# Patient Record
Sex: FEMALE | Race: WHITE | Employment: UNEMPLOYED | URBAN - METROPOLITAN AREA
[De-identification: names, ages, dates, MRNs, and addresses within clinical notes are randomized per-mention and may not be internally consistent; named-entity substitution may affect disease eponyms.]

---

## 2019-05-28 ENCOUNTER — HOSPITAL ENCOUNTER (INPATIENT)
Age: 69
LOS: 4 days | Discharge: HOME | DRG: 190 | End: 2019-06-01
Attending: EMERGENCY MEDICINE | Admitting: FAMILY MEDICINE
Payer: MEDICARE

## 2019-05-28 ENCOUNTER — APPOINTMENT (OUTPATIENT)
Dept: GENERAL RADIOLOGY | Age: 69
DRG: 190 | End: 2019-05-28
Payer: MEDICARE

## 2019-05-28 DIAGNOSIS — E87.6 HYPOKALEMIA: ICD-10-CM

## 2019-05-28 DIAGNOSIS — J44.1 COPD EXACERBATION (HCC): Primary | ICD-10-CM

## 2019-05-28 LAB
ABSOLUTE EOS #: 0.18 K/UL (ref 0–0.44)
ABSOLUTE IMMATURE GRANULOCYTE: 0.01 K/UL (ref 0–0.3)
ABSOLUTE LYMPH #: 3.55 K/UL (ref 1.1–3.7)
ABSOLUTE MONO #: 0.95 K/UL (ref 0.1–1.2)
ANION GAP SERPL CALCULATED.3IONS-SCNC: 17 MMOL/L (ref 9–17)
BASOPHILS # BLD: 1 % (ref 0–2)
BASOPHILS ABSOLUTE: 0.07 K/UL (ref 0–0.2)
BNP INTERPRETATION: ABNORMAL
BUN BLDV-MCNC: 8 MG/DL (ref 8–23)
BUN/CREAT BLD: 9 (ref 9–20)
CALCIUM SERPL-MCNC: 9.6 MG/DL (ref 8.6–10.4)
CHLORIDE BLD-SCNC: 101 MMOL/L (ref 98–107)
CO2: 21 MMOL/L (ref 20–31)
CREAT SERPL-MCNC: 0.85 MG/DL (ref 0.5–0.9)
DIFFERENTIAL TYPE: NORMAL
EOSINOPHILS RELATIVE PERCENT: 2 % (ref 1–4)
GFR AFRICAN AMERICAN: >60 ML/MIN
GFR NON-AFRICAN AMERICAN: >60 ML/MIN
GFR SERPL CREATININE-BSD FRML MDRD: ABNORMAL ML/MIN/{1.73_M2}
GFR SERPL CREATININE-BSD FRML MDRD: ABNORMAL ML/MIN/{1.73_M2}
GLUCOSE BLD-MCNC: 108 MG/DL (ref 70–99)
HCT VFR BLD CALC: 43.6 % (ref 36.3–47.1)
HEMOGLOBIN: 14 G/DL (ref 11.9–15.1)
IMMATURE GRANULOCYTES: 0 %
LYMPHOCYTES # BLD: 35 % (ref 24–43)
MAGNESIUM: 1.8 MG/DL (ref 1.6–2.6)
MCH RBC QN AUTO: 32.9 PG (ref 25.2–33.5)
MCHC RBC AUTO-ENTMCNC: 32.1 G/DL (ref 28.4–34.8)
MCV RBC AUTO: 102.6 FL (ref 82.6–102.9)
MONOCYTES # BLD: 10 % (ref 3–12)
MYOGLOBIN: 48 NG/ML (ref 25–58)
MYOGLOBIN: 56 NG/ML (ref 25–58)
NRBC AUTOMATED: NORMAL PER 100 WBC
PDW BLD-RTO: 13.6 % (ref 11.8–14.4)
PLATELET # BLD: 446 K/UL (ref 138–453)
PLATELET ESTIMATE: NORMAL
PMV BLD AUTO: 9.2 FL (ref 8.1–13.5)
POTASSIUM SERPL-SCNC: 2.8 MMOL/L (ref 3.7–5.3)
PRO-BNP: 467 PG/ML
RBC # BLD: 4.25 M/UL (ref 3.95–5.11)
RBC # BLD: NORMAL 10*6/UL
SEG NEUTROPHILS: 53 % (ref 36–65)
SEGMENTED NEUTROPHILS ABSOLUTE COUNT: 5.29 K/UL (ref 1.5–8.1)
SODIUM BLD-SCNC: 139 MMOL/L (ref 135–144)
TROPONIN INTERP: NORMAL
TROPONIN INTERP: NORMAL
TROPONIN T: NORMAL NG/ML
TROPONIN T: NORMAL NG/ML
TROPONIN, HIGH SENSITIVITY: 8 NG/L (ref 0–14)
TROPONIN, HIGH SENSITIVITY: 8 NG/L (ref 0–14)
WBC # BLD: 10.1 K/UL (ref 3.5–11.3)
WBC # BLD: NORMAL 10*3/UL

## 2019-05-28 PROCEDURE — 99291 CRITICAL CARE FIRST HOUR: CPT

## 2019-05-28 PROCEDURE — 71045 X-RAY EXAM CHEST 1 VIEW: CPT

## 2019-05-28 PROCEDURE — 2060000000 HC ICU INTERMEDIATE R&B

## 2019-05-28 PROCEDURE — 94640 AIRWAY INHALATION TREATMENT: CPT

## 2019-05-28 PROCEDURE — 85025 COMPLETE CBC W/AUTO DIFF WBC: CPT

## 2019-05-28 PROCEDURE — 6360000002 HC RX W HCPCS: Performed by: NURSE PRACTITIONER

## 2019-05-28 PROCEDURE — 94761 N-INVAS EAR/PLS OXIMETRY MLT: CPT

## 2019-05-28 PROCEDURE — 2580000003 HC RX 258: Performed by: NURSE PRACTITIONER

## 2019-05-28 PROCEDURE — 94150 VITAL CAPACITY TEST: CPT

## 2019-05-28 PROCEDURE — 83874 ASSAY OF MYOGLOBIN: CPT

## 2019-05-28 PROCEDURE — 2700000000 HC OXYGEN THERAPY PER DAY

## 2019-05-28 PROCEDURE — 96375 TX/PRO/DX INJ NEW DRUG ADDON: CPT

## 2019-05-28 PROCEDURE — 80048 BASIC METABOLIC PNL TOTAL CA: CPT

## 2019-05-28 PROCEDURE — 83880 ASSAY OF NATRIURETIC PEPTIDE: CPT

## 2019-05-28 PROCEDURE — 83735 ASSAY OF MAGNESIUM: CPT

## 2019-05-28 PROCEDURE — 96374 THER/PROPH/DIAG INJ IV PUSH: CPT

## 2019-05-28 PROCEDURE — 87070 CULTURE OTHR SPECIMN AEROBIC: CPT

## 2019-05-28 PROCEDURE — 87205 SMEAR GRAM STAIN: CPT

## 2019-05-28 PROCEDURE — 6370000000 HC RX 637 (ALT 250 FOR IP): Performed by: NURSE PRACTITIONER

## 2019-05-28 PROCEDURE — 93005 ELECTROCARDIOGRAM TRACING: CPT | Performed by: NURSE PRACTITIONER

## 2019-05-28 PROCEDURE — 36415 COLL VENOUS BLD VENIPUNCTURE: CPT

## 2019-05-28 PROCEDURE — 84484 ASSAY OF TROPONIN QUANT: CPT

## 2019-05-28 PROCEDURE — 6360000002 HC RX W HCPCS: Performed by: EMERGENCY MEDICINE

## 2019-05-28 RX ORDER — OMEPRAZOLE 40 MG/1
40 CAPSULE, DELAYED RELEASE ORAL DAILY
COMMUNITY

## 2019-05-28 RX ORDER — METOPROLOL SUCCINATE 25 MG/1
25 TABLET, EXTENDED RELEASE ORAL DAILY
COMMUNITY

## 2019-05-28 RX ORDER — NICOTINE 21 MG/24HR
1 PATCH, TRANSDERMAL 24 HOURS TRANSDERMAL DAILY PRN
Status: DISCONTINUED | OUTPATIENT
Start: 2019-05-28 | End: 2019-06-01 | Stop reason: HOSPADM

## 2019-05-28 RX ORDER — ACETAMINOPHEN 325 MG/1
650 TABLET ORAL EVERY 4 HOURS PRN
Status: DISCONTINUED | OUTPATIENT
Start: 2019-05-28 | End: 2019-06-01 | Stop reason: HOSPADM

## 2019-05-28 RX ORDER — IPRATROPIUM BROMIDE AND ALBUTEROL SULFATE 2.5; .5 MG/3ML; MG/3ML
1 SOLUTION RESPIRATORY (INHALATION) EVERY 4 HOURS PRN
Status: DISCONTINUED | OUTPATIENT
Start: 2019-05-28 | End: 2019-06-01 | Stop reason: HOSPADM

## 2019-05-28 RX ORDER — FUROSEMIDE 10 MG/ML
40 INJECTION INTRAMUSCULAR; INTRAVENOUS ONCE
Status: COMPLETED | OUTPATIENT
Start: 2019-05-28 | End: 2019-05-28

## 2019-05-28 RX ORDER — ISOSORBIDE MONONITRATE 30 MG/1
30 TABLET, EXTENDED RELEASE ORAL DAILY
COMMUNITY

## 2019-05-28 RX ORDER — GABAPENTIN 300 MG/1
600 CAPSULE ORAL 2 TIMES DAILY
Status: DISCONTINUED | OUTPATIENT
Start: 2019-05-28 | End: 2019-06-01 | Stop reason: HOSPADM

## 2019-05-28 RX ORDER — CYCLOBENZAPRINE HCL 5 MG
5 TABLET ORAL 3 TIMES DAILY PRN
Status: ON HOLD | COMMUNITY
End: 2019-05-30

## 2019-05-28 RX ORDER — FLUTICASONE PROPIONATE 50 MCG
1 SPRAY, SUSPENSION (ML) NASAL DAILY
COMMUNITY

## 2019-05-28 RX ORDER — METOPROLOL SUCCINATE 25 MG/1
25 TABLET, EXTENDED RELEASE ORAL DAILY
Status: DISCONTINUED | OUTPATIENT
Start: 2019-05-29 | End: 2019-06-01 | Stop reason: HOSPADM

## 2019-05-28 RX ORDER — FUROSEMIDE 40 MG/1
40 TABLET ORAL DAILY
Status: DISCONTINUED | OUTPATIENT
Start: 2019-05-29 | End: 2019-06-01 | Stop reason: HOSPADM

## 2019-05-28 RX ORDER — POTASSIUM CHLORIDE 20 MEQ/1
20 TABLET, EXTENDED RELEASE ORAL 2 TIMES DAILY WITH MEALS
Status: DISCONTINUED | OUTPATIENT
Start: 2019-05-29 | End: 2019-05-29

## 2019-05-28 RX ORDER — ONDANSETRON 2 MG/ML
4 INJECTION INTRAMUSCULAR; INTRAVENOUS EVERY 6 HOURS PRN
Status: DISCONTINUED | OUTPATIENT
Start: 2019-05-28 | End: 2019-05-28 | Stop reason: SDUPTHER

## 2019-05-28 RX ORDER — MAGNESIUM SULFATE 1 G/100ML
1 INJECTION INTRAVENOUS ONCE
Status: COMPLETED | OUTPATIENT
Start: 2019-05-28 | End: 2019-05-28

## 2019-05-28 RX ORDER — ONDANSETRON 4 MG/1
4 TABLET, ORALLY DISINTEGRATING ORAL EVERY 6 HOURS PRN
Status: DISCONTINUED | OUTPATIENT
Start: 2019-05-28 | End: 2019-06-01 | Stop reason: HOSPADM

## 2019-05-28 RX ORDER — AMITRIPTYLINE HYDROCHLORIDE 25 MG/1
25 TABLET, FILM COATED ORAL NIGHTLY PRN
COMMUNITY

## 2019-05-28 RX ORDER — IPRATROPIUM BROMIDE AND ALBUTEROL SULFATE 2.5; .5 MG/3ML; MG/3ML
1 SOLUTION RESPIRATORY (INHALATION)
Status: DISCONTINUED | OUTPATIENT
Start: 2019-05-29 | End: 2019-06-01 | Stop reason: HOSPADM

## 2019-05-28 RX ORDER — ISOSORBIDE MONONITRATE 30 MG/1
30 TABLET, EXTENDED RELEASE ORAL DAILY
Status: DISCONTINUED | OUTPATIENT
Start: 2019-05-29 | End: 2019-06-01 | Stop reason: HOSPADM

## 2019-05-28 RX ORDER — METHYLPREDNISOLONE SODIUM SUCCINATE 125 MG/2ML
80 INJECTION, POWDER, LYOPHILIZED, FOR SOLUTION INTRAMUSCULAR; INTRAVENOUS EVERY 8 HOURS
Status: DISCONTINUED | OUTPATIENT
Start: 2019-05-29 | End: 2019-05-30

## 2019-05-28 RX ORDER — ALBUTEROL SULFATE 90 UG/1
2 AEROSOL, METERED RESPIRATORY (INHALATION)
Status: DISCONTINUED | OUTPATIENT
Start: 2019-05-28 | End: 2019-05-28

## 2019-05-28 RX ORDER — ONDANSETRON 2 MG/ML
4 INJECTION INTRAMUSCULAR; INTRAVENOUS EVERY 6 HOURS PRN
Status: DISCONTINUED | OUTPATIENT
Start: 2019-05-28 | End: 2019-06-01 | Stop reason: HOSPADM

## 2019-05-28 RX ORDER — BUSPIRONE HYDROCHLORIDE 5 MG/1
7.5 TABLET ORAL 2 TIMES DAILY
Status: DISCONTINUED | OUTPATIENT
Start: 2019-05-28 | End: 2019-06-01 | Stop reason: HOSPADM

## 2019-05-28 RX ORDER — SODIUM CHLORIDE 0.9 % (FLUSH) 0.9 %
10 SYRINGE (ML) INJECTION EVERY 12 HOURS SCHEDULED
Status: DISCONTINUED | OUTPATIENT
Start: 2019-05-29 | End: 2019-06-01 | Stop reason: HOSPADM

## 2019-05-28 RX ORDER — MAGNESIUM SULFATE 1 G/100ML
1 INJECTION INTRAVENOUS ONCE
Status: COMPLETED | OUTPATIENT
Start: 2019-05-28 | End: 2019-05-29

## 2019-05-28 RX ORDER — BUDESONIDE AND FORMOTEROL FUMARATE DIHYDRATE 160; 4.5 UG/1; UG/1
2 AEROSOL RESPIRATORY (INHALATION) 2 TIMES DAILY
COMMUNITY

## 2019-05-28 RX ORDER — FUROSEMIDE 40 MG/1
40 TABLET ORAL DAILY
COMMUNITY

## 2019-05-28 RX ORDER — HYDROCODONE BITARTRATE AND ACETAMINOPHEN 5; 325 MG/1; MG/1
1 TABLET ORAL EVERY 6 HOURS PRN
Status: DISCONTINUED | OUTPATIENT
Start: 2019-05-28 | End: 2019-05-29 | Stop reason: ALTCHOICE

## 2019-05-28 RX ORDER — IPRATROPIUM BROMIDE AND ALBUTEROL SULFATE 2.5; .5 MG/3ML; MG/3ML
1 SOLUTION RESPIRATORY (INHALATION)
Status: DISCONTINUED | OUTPATIENT
Start: 2019-05-29 | End: 2019-05-28

## 2019-05-28 RX ORDER — PANTOPRAZOLE SODIUM 40 MG/1
40 TABLET, DELAYED RELEASE ORAL
Status: DISCONTINUED | OUTPATIENT
Start: 2019-05-29 | End: 2019-06-01 | Stop reason: HOSPADM

## 2019-05-28 RX ORDER — BUSPIRONE HYDROCHLORIDE 7.5 MG/1
7.5 TABLET ORAL 2 TIMES DAILY
COMMUNITY

## 2019-05-28 RX ORDER — SODIUM CHLORIDE 0.9 % (FLUSH) 0.9 %
10 SYRINGE (ML) INJECTION PRN
Status: DISCONTINUED | OUTPATIENT
Start: 2019-05-28 | End: 2019-06-01 | Stop reason: HOSPADM

## 2019-05-28 RX ORDER — FLUTICASONE PROPIONATE 50 MCG
1 SPRAY, SUSPENSION (ML) NASAL DAILY
Status: DISCONTINUED | OUTPATIENT
Start: 2019-05-29 | End: 2019-06-01 | Stop reason: HOSPADM

## 2019-05-28 RX ORDER — MONTELUKAST SODIUM 10 MG/1
10 TABLET ORAL NIGHTLY
Status: DISCONTINUED | OUTPATIENT
Start: 2019-05-28 | End: 2019-06-01 | Stop reason: HOSPADM

## 2019-05-28 RX ORDER — METHYLPREDNISOLONE SODIUM SUCCINATE 125 MG/2ML
125 INJECTION, POWDER, LYOPHILIZED, FOR SOLUTION INTRAMUSCULAR; INTRAVENOUS ONCE
Status: COMPLETED | OUTPATIENT
Start: 2019-05-28 | End: 2019-05-28

## 2019-05-28 RX ORDER — TROSPIUM CHLORIDE 20 MG/1
20 TABLET, FILM COATED ORAL
Status: DISCONTINUED | OUTPATIENT
Start: 2019-05-29 | End: 2019-05-31

## 2019-05-28 RX ORDER — DOXYCYCLINE 100 MG/1
100 CAPSULE ORAL 2 TIMES DAILY
Status: DISCONTINUED | OUTPATIENT
Start: 2019-05-28 | End: 2019-06-01

## 2019-05-28 RX ORDER — SOLIFENACIN SUCCINATE 10 MG/1
5 TABLET, FILM COATED ORAL DAILY
Status: ON HOLD | COMMUNITY
End: 2019-05-30

## 2019-05-28 RX ORDER — GABAPENTIN 600 MG/1
600 TABLET ORAL 3 TIMES DAILY
Status: ON HOLD | COMMUNITY
End: 2019-06-01 | Stop reason: SDUPTHER

## 2019-05-28 RX ORDER — MONTELUKAST SODIUM 10 MG/1
10 TABLET ORAL EVERY MORNING
COMMUNITY

## 2019-05-28 RX ORDER — 0.9 % SODIUM CHLORIDE 0.9 %
1000 INTRAVENOUS SOLUTION INTRAVENOUS ONCE
Status: COMPLETED | OUTPATIENT
Start: 2019-05-28 | End: 2019-05-28

## 2019-05-28 RX ORDER — ALBUTEROL SULFATE 2.5 MG/3ML
5 SOLUTION RESPIRATORY (INHALATION)
Status: DISCONTINUED | OUTPATIENT
Start: 2019-05-28 | End: 2019-05-28

## 2019-05-28 RX ADMIN — SODIUM CHLORIDE 1000 ML: 9 INJECTION, SOLUTION INTRAVENOUS at 20:32

## 2019-05-28 RX ADMIN — MAGNESIUM SULFATE HEPTAHYDRATE 1 G: 1 INJECTION, SOLUTION INTRAVENOUS at 21:55

## 2019-05-28 RX ADMIN — METHYLPREDNISOLONE SODIUM SUCCINATE 125 MG: 125 INJECTION, POWDER, FOR SOLUTION INTRAMUSCULAR; INTRAVENOUS at 20:32

## 2019-05-28 RX ADMIN — POTASSIUM BICARBONATE 60 MEQ: 782 TABLET, EFFERVESCENT ORAL at 21:55

## 2019-05-28 RX ADMIN — MAGNESIUM SULFATE HEPTAHYDRATE 1 G: 1 INJECTION, SOLUTION INTRAVENOUS at 23:03

## 2019-05-28 RX ADMIN — ALBUTEROL SULFATE 2.5 MG: 5 SOLUTION RESPIRATORY (INHALATION) at 20:42

## 2019-05-28 RX ADMIN — FUROSEMIDE 40 MG: 10 INJECTION, SOLUTION INTRAMUSCULAR; INTRAVENOUS at 21:53

## 2019-05-28 ASSESSMENT — ENCOUNTER SYMPTOMS
SHORTNESS OF BREATH: 1
COUGH: 1
CHEST TIGHTNESS: 1
WHEEZING: 1

## 2019-05-29 ENCOUNTER — APPOINTMENT (OUTPATIENT)
Dept: GENERAL RADIOLOGY | Age: 69
DRG: 190 | End: 2019-05-29
Payer: MEDICARE

## 2019-05-29 LAB
ANION GAP SERPL CALCULATED.3IONS-SCNC: 14 MMOL/L (ref 9–17)
BUN BLDV-MCNC: 8 MG/DL (ref 8–23)
BUN/CREAT BLD: 13 (ref 9–20)
CALCIUM SERPL-MCNC: 9.5 MG/DL (ref 8.6–10.4)
CHLORIDE BLD-SCNC: 100 MMOL/L (ref 98–107)
CO2: 27 MMOL/L (ref 20–31)
CREAT SERPL-MCNC: 0.62 MG/DL (ref 0.5–0.9)
EKG ATRIAL RATE: 66 BPM
EKG P AXIS: 63 DEGREES
EKG P-R INTERVAL: 180 MS
EKG Q-T INTERVAL: 454 MS
EKG QRS DURATION: 80 MS
EKG QTC CALCULATION (BAZETT): 475 MS
EKG R AXIS: 91 DEGREES
EKG T AXIS: 71 DEGREES
EKG VENTRICULAR RATE: 66 BPM
GFR AFRICAN AMERICAN: >60 ML/MIN
GFR NON-AFRICAN AMERICAN: >60 ML/MIN
GFR SERPL CREATININE-BSD FRML MDRD: ABNORMAL ML/MIN/{1.73_M2}
GFR SERPL CREATININE-BSD FRML MDRD: ABNORMAL ML/MIN/{1.73_M2}
GLUCOSE BLD-MCNC: 189 MG/DL (ref 70–99)
HCT VFR BLD CALC: 44.6 % (ref 36.3–47.1)
HEMOGLOBIN: 14.5 G/DL (ref 11.9–15.1)
MAGNESIUM: 2.4 MG/DL (ref 1.6–2.6)
MCH RBC QN AUTO: 33.3 PG (ref 25.2–33.5)
MCHC RBC AUTO-ENTMCNC: 32.5 G/DL (ref 28.4–34.8)
MCV RBC AUTO: 102.5 FL (ref 82.6–102.9)
NRBC AUTOMATED: 0 PER 100 WBC
PDW BLD-RTO: 14 % (ref 11.8–14.4)
PLATELET # BLD: 445 K/UL (ref 138–453)
PMV BLD AUTO: 9.8 FL (ref 8.1–13.5)
POTASSIUM SERPL-SCNC: 3.9 MMOL/L (ref 3.7–5.3)
RBC # BLD: 4.35 M/UL (ref 3.95–5.11)
SODIUM BLD-SCNC: 141 MMOL/L (ref 135–144)
WBC # BLD: 7 K/UL (ref 3.5–11.3)

## 2019-05-29 PROCEDURE — 97166 OT EVAL MOD COMPLEX 45 MIN: CPT

## 2019-05-29 PROCEDURE — 71045 X-RAY EXAM CHEST 1 VIEW: CPT

## 2019-05-29 PROCEDURE — 97116 GAIT TRAINING THERAPY: CPT

## 2019-05-29 PROCEDURE — 6370000000 HC RX 637 (ALT 250 FOR IP): Performed by: FAMILY MEDICINE

## 2019-05-29 PROCEDURE — 2060000000 HC ICU INTERMEDIATE R&B

## 2019-05-29 PROCEDURE — 2700000000 HC OXYGEN THERAPY PER DAY

## 2019-05-29 PROCEDURE — 36415 COLL VENOUS BLD VENIPUNCTURE: CPT

## 2019-05-29 PROCEDURE — 94761 N-INVAS EAR/PLS OXIMETRY MLT: CPT

## 2019-05-29 PROCEDURE — 97535 SELF CARE MNGMENT TRAINING: CPT

## 2019-05-29 PROCEDURE — 83735 ASSAY OF MAGNESIUM: CPT

## 2019-05-29 PROCEDURE — 97161 PT EVAL LOW COMPLEX 20 MIN: CPT

## 2019-05-29 PROCEDURE — 80048 BASIC METABOLIC PNL TOTAL CA: CPT

## 2019-05-29 PROCEDURE — 85027 COMPLETE CBC AUTOMATED: CPT

## 2019-05-29 PROCEDURE — 93010 ELECTROCARDIOGRAM REPORT: CPT | Performed by: INTERNAL MEDICINE

## 2019-05-29 PROCEDURE — 2580000003 HC RX 258: Performed by: FAMILY MEDICINE

## 2019-05-29 PROCEDURE — 6360000002 HC RX W HCPCS: Performed by: FAMILY MEDICINE

## 2019-05-29 PROCEDURE — 94640 AIRWAY INHALATION TREATMENT: CPT

## 2019-05-29 RX ORDER — OXYCODONE HYDROCHLORIDE AND ACETAMINOPHEN 5; 325 MG/1; MG/1
1 TABLET ORAL EVERY 6 HOURS PRN
Status: DISCONTINUED | OUTPATIENT
Start: 2019-05-29 | End: 2019-06-01 | Stop reason: HOSPADM

## 2019-05-29 RX ORDER — NICOTINE 21 MG/24HR
1 PATCH, TRANSDERMAL 24 HOURS TRANSDERMAL DAILY
Status: DISCONTINUED | OUTPATIENT
Start: 2019-05-29 | End: 2019-06-01 | Stop reason: HOSPADM

## 2019-05-29 RX ORDER — POTASSIUM CHLORIDE 20 MEQ/1
20 TABLET, EXTENDED RELEASE ORAL
Status: DISCONTINUED | OUTPATIENT
Start: 2019-05-29 | End: 2019-06-01 | Stop reason: HOSPADM

## 2019-05-29 RX ADMIN — HYDROCODONE BITARTRATE AND ACETAMINOPHEN 1 TABLET: 5; 325 TABLET ORAL at 11:26

## 2019-05-29 RX ADMIN — FLUTICASONE PROPIONATE 1 SPRAY: 50 SPRAY, METERED NASAL at 08:28

## 2019-05-29 RX ADMIN — Medication 10 ML: at 20:06

## 2019-05-29 RX ADMIN — POTASSIUM CHLORIDE 20 MEQ: 20 TABLET, EXTENDED RELEASE ORAL at 08:27

## 2019-05-29 RX ADMIN — IPRATROPIUM BROMIDE AND ALBUTEROL SULFATE 1 AMPULE: .5; 3 SOLUTION RESPIRATORY (INHALATION) at 14:33

## 2019-05-29 RX ADMIN — MONTELUKAST SODIUM 10 MG: 10 TABLET, FILM COATED ORAL at 20:00

## 2019-05-29 RX ADMIN — METHYLPREDNISOLONE SODIUM SUCCINATE 80 MG: 125 INJECTION, POWDER, FOR SOLUTION INTRAMUSCULAR; INTRAVENOUS at 11:26

## 2019-05-29 RX ADMIN — GABAPENTIN 600 MG: 300 CAPSULE ORAL at 00:54

## 2019-05-29 RX ADMIN — BUSPIRONE HYDROCHLORIDE 7.5 MG: 5 TABLET ORAL at 20:01

## 2019-05-29 RX ADMIN — GABAPENTIN 600 MG: 300 CAPSULE ORAL at 08:27

## 2019-05-29 RX ADMIN — DOXYCYCLINE 100 MG: 100 CAPSULE ORAL at 08:28

## 2019-05-29 RX ADMIN — METHYLPREDNISOLONE SODIUM SUCCINATE 80 MG: 125 INJECTION, POWDER, FOR SOLUTION INTRAMUSCULAR; INTRAVENOUS at 20:02

## 2019-05-29 RX ADMIN — FUROSEMIDE 40 MG: 40 TABLET ORAL at 08:28

## 2019-05-29 RX ADMIN — CEFTRIAXONE SODIUM 1 G: 1 INJECTION, POWDER, FOR SOLUTION INTRAMUSCULAR; INTRAVENOUS at 00:59

## 2019-05-29 RX ADMIN — ENOXAPARIN SODIUM 40 MG: 40 INJECTION SUBCUTANEOUS at 08:28

## 2019-05-29 RX ADMIN — METOPROLOL SUCCINATE 25 MG: 25 TABLET, FILM COATED, EXTENDED RELEASE ORAL at 08:27

## 2019-05-29 RX ADMIN — IPRATROPIUM BROMIDE AND ALBUTEROL SULFATE 1 AMPULE: .5; 3 SOLUTION RESPIRATORY (INHALATION) at 08:22

## 2019-05-29 RX ADMIN — HYDROCODONE BITARTRATE AND ACETAMINOPHEN 1 TABLET: 5; 325 TABLET ORAL at 04:07

## 2019-05-29 RX ADMIN — OXYCODONE AND ACETAMINOPHEN 1 TABLET: 5; 325 TABLET ORAL at 18:25

## 2019-05-29 RX ADMIN — ISOSORBIDE MONONITRATE 30 MG: 30 TABLET ORAL at 08:27

## 2019-05-29 RX ADMIN — Medication 10 ML: at 08:36

## 2019-05-29 RX ADMIN — METHYLPREDNISOLONE SODIUM SUCCINATE 80 MG: 125 INJECTION, POWDER, FOR SOLUTION INTRAMUSCULAR; INTRAVENOUS at 04:08

## 2019-05-29 RX ADMIN — BUSPIRONE HYDROCHLORIDE 7.5 MG: 5 TABLET ORAL at 08:27

## 2019-05-29 RX ADMIN — GABAPENTIN 600 MG: 300 CAPSULE ORAL at 20:02

## 2019-05-29 RX ADMIN — DOXYCYCLINE 100 MG: 100 CAPSULE ORAL at 20:02

## 2019-05-29 RX ADMIN — DOXYCYCLINE 100 MG: 100 CAPSULE ORAL at 00:55

## 2019-05-29 RX ADMIN — IPRATROPIUM BROMIDE AND ALBUTEROL SULFATE 1 AMPULE: .5; 3 SOLUTION RESPIRATORY (INHALATION) at 11:14

## 2019-05-29 RX ADMIN — PANTOPRAZOLE SODIUM 40 MG: 40 TABLET, DELAYED RELEASE ORAL at 06:19

## 2019-05-29 RX ADMIN — BUSPIRONE HYDROCHLORIDE 7.5 MG: 5 TABLET ORAL at 00:55

## 2019-05-29 RX ADMIN — CEFTRIAXONE SODIUM 1 G: 1 INJECTION, POWDER, FOR SOLUTION INTRAMUSCULAR; INTRAVENOUS at 23:09

## 2019-05-29 ASSESSMENT — PAIN DESCRIPTION - DESCRIPTORS
DESCRIPTORS: DISCOMFORT
DESCRIPTORS: DISCOMFORT
DESCRIPTORS: CRAMPING
DESCRIPTORS: SHARP;SHOOTING

## 2019-05-29 ASSESSMENT — PAIN SCALES - GENERAL
PAINLEVEL_OUTOF10: 8
PAINLEVEL_OUTOF10: 0
PAINLEVEL_OUTOF10: 7
PAINLEVEL_OUTOF10: 3
PAINLEVEL_OUTOF10: 8
PAINLEVEL_OUTOF10: 5

## 2019-05-29 ASSESSMENT — PAIN DESCRIPTION - FREQUENCY
FREQUENCY: INTERMITTENT

## 2019-05-29 ASSESSMENT — PAIN DESCRIPTION - PAIN TYPE
TYPE: CHRONIC PAIN

## 2019-05-29 ASSESSMENT — PAIN DESCRIPTION - LOCATION
LOCATION: NECK
LOCATION: NECK
LOCATION: ARM
LOCATION: NECK

## 2019-05-29 ASSESSMENT — PAIN DESCRIPTION - ORIENTATION
ORIENTATION: RIGHT
ORIENTATION: RIGHT

## 2019-05-29 ASSESSMENT — PAIN DESCRIPTION - ONSET
ONSET: ON-GOING

## 2019-05-29 ASSESSMENT — PAIN - FUNCTIONAL ASSESSMENT
PAIN_FUNCTIONAL_ASSESSMENT: ACTIVITIES ARE NOT PREVENTED

## 2019-05-29 ASSESSMENT — PAIN DESCRIPTION - PROGRESSION
CLINICAL_PROGRESSION: GRADUALLY WORSENING
CLINICAL_PROGRESSION: NOT CHANGED

## 2019-05-29 NOTE — FLOWSHEET NOTE
05/29/19 0730   Provider Notification   Reason for Communication Review case   Provider Name Dr. Gonzales Ohs   Provider Notification Physician   Method of Communication Face to face   Response At bedside     MD rounded at this time. New orders received. RN to place orders. See new orders/MAR.

## 2019-05-29 NOTE — H&P
Hussein Mackenzie MD, [facility]  History and Physical    Patient:  Yesenia Snyder  MRN: [id number]    CHIEF COMPLAINT:  ' My cough was getting worse '    History Obtained From:  patient, non-family caregiver - er physician, nursing, electronic medical record  PCP: Carlie Scott MD    HISTORY OF PRESENT ILLNESS:   The patient is a 80 y.o. female who presents with cough, congestion and wheezing worsening over the last 5 days. Pt says there was some phlegm initially. Pt says it was difficult for her to breathe. No reported fever, rash, joint swelling    Pt denies any orthopnea. Pt says she has Oxygen available at home however she is not using that as ordered    Pt unfortunately also continues to smoke. Says mostly more than half a pack    Pt used breathing treatments at home but they did not work for her much so she presented to the ER    Other ROS neg      Past Medical History:        Diagnosis Date    Asthma     Osteoarthritis        Past Surgical History:    History reviewed. No pertinent surgical history. Medications Prior to Admission:    Prior to Admission medications    Medication Sig Start Date End Date Taking? Authorizing Provider   busPIRone (BUSPAR) 10 MG tablet Take 7.5 mg by mouth 2 times daily   Yes Historical Provider, MD   cyclobenzaprine (FLEXERIL) 5 MG tablet Take 5 mg by mouth 3 times daily as needed for Muscle spasms   Yes Historical Provider, MD   fluticasone (FLONASE) 50 MCG/ACT nasal spray 1 spray by Each Nostril route daily   Yes Historical Provider, MD   furosemide (LASIX) 40 MG tablet Take 40 mg by mouth daily   Yes Historical Provider, MD   gabapentin (NEURONTIN) 600 MG tablet Take 600 mg by mouth 2 times daily.    Yes Historical Provider, MD   isosorbide mononitrate (IMDUR) 30 MG extended release tablet Take 30 mg by mouth daily   Yes Historical Provider, MD   metoprolol succinate (TOPROL XL) 25 MG extended release tablet Take 25 mg by mouth daily   Yes Historical Provider, MD   montelukast adenopathy, no carotid bruit, no JVD, supple, symmetrical, trachea midline and thyroid not enlarged, symmetric, no tenderness/mass/nodules  Lungs: diminished breath sounds bibasilar and rales sporadic  Heart: regular rate and rhythm, S1, S2 normal, no S3 or S4 and no rub  Abdomen: soft, non-tender; bowel sounds normal; no masses,  no organomegaly  Extremities: extremities normal, atraumatic, no cyanosis or edema  Neurologic: Mental status: Alert, oriented, thought content appropriate    CBC:   Recent Labs     05/28/19 2020 05/29/19  0504   WBC 10.1 7.0   HGB 14.0 14.5    445     BMP:    Recent Labs     05/28/19 2020 05/29/19  0504    141   K 2.8* 3.9    100   CO2 21 27   BUN 8 8   CREATININE 0.85 0.62   GLUCOSE 108* 189*     Hepatic: No results for input(s): AST, ALT, ALB, BILITOT, ALKPHOS in the last 72 hours. Troponin: No results for input(s): TROPONINI in the last 72 hours. BNP: No results for input(s): BNP in the last 72 hours. Lipids: No results for input(s): CHOL, HDL in the last 72 hours. Invalid input(s): LDLCALCU  ABGs: No results found for: PHART, PO2ART, NXN4KIA  INR: No results for input(s): INR in the last 72 hours.   -----------------------------------------------------------------  EKG 12 Lead [[id number]] Collected: 05/28/19 1959   Updated: 05/29/19 0653     Ventricular Rate 66 BPM    Atrial Rate 66 BPM    P-R Interval 180 ms    QRS Duration 80 ms    Q-T Interval 454 ms    QTc Calculation (Bazett) 475 ms    P Axis 63 degrees    R Axis 91 degrees    T Axis 71 degrees   Narrative:     Normal sinus rhythm  Rightward axis  Borderline ECG  No previous ECGs available   Magnesium [[id number]] Collected: 05/29/19 0504   Updated: 05/29/19 0624     Magnesium 2.4 mg/dL   XR CHEST PORTABLE [[id number]] Collected: 05/29/19 0617   Updated: 05/29/19 1096    Basic Metabolic Panel w/ Reflex to MG [[id number]] (Abnormal) Collected: 05/29/19 0504   Updated: 05/29/19 0554     Glucose 189High  mg/dL BUN 8 mg/dL    CREATININE 0.62 mg/dL    Bun/Cre Ratio 13    Calcium 9.5 mg/dL    Sodium 141 mmol/L    Potassium 3.9 mmol/L    Chloride 100 mmol/L    CO2 27 mmol/L    Anion Gap 14 mmol/L    GFR Non-African American >60 mL/min    GFR African American >60 mL/min    GFR Comment         Comment: Average GFR for 61-76 years old:    85 mL/min/1.73sq m   Chronic Kidney Disease:    <60 mL/min/1.73sq m   Kidney failure:    <15 mL/min/1.73sq m               eGFR calculated using average adult body mass. Additional eGFR calculator available at:         [email or web address]              GFR Staging NOT REPORTED   CBC [[id number]] Collected: 05/29/19 0504   Updated: 05/29/19 0529     WBC 7.0 k/uL    RBC 4.35 m/uL    Hemoglobin 14.5 g/dL    Hematocrit 44.6 %    .5 fL    MCH 33.3 pg    MCHC 32.5 g/dL    RDW 14.0 %    Platelets 124 k/uL    MPV 9.8 fL    NRBC Automated 0.0 per 100 WBC   Trop/Myoglobin [[id number]] Collected: 05/28/19 2215   Updated: 05/28/19 2244    Specimen Source: Blood     Troponin, High Sensitivity 8 ng/L    Comment:        High Sensitivity Troponin values cannot be compared with other Troponin methodologies.         Patients with high levels of Biotin oral intake (i.e >5mg/day) may have falsely decreased   Troponin levels. Samples collected within 8 hours of biotin intake may require additional   information for diagnosis. Troponin T NOT REPORTED ng/mL    Troponin Interp NOT REPORTED    Myoglobin 56 ng/mL   XR CHEST PORTABLE [[id number]] Collected: 05/28/19 2103   Updated: 05/28/19 2109    Narrative:     EXAMINATION:  ONE XRAY VIEW OF THE CHEST    5/28/2019 8:59 pm    COMPARISON:  None. HISTORY:  ORDERING SYSTEM PROVIDED HISTORY: SOB  TECHNOLOGIST PROVIDED HISTORY:  SOB  Ordering Physician Provided Reason for Exam: Pt states sob, chest pain, cough  since today.  Hx of COPD  Acuity: Acute  Type of Exam: Initial  Relevant Medical/Surgical History: COPD    FINDINGS:  Cardiomegaly with central vascular congestion.  Prominent pulmonary markings  indicate interstitial lung disease.  No effusion.  No pneumothorax.  No  airspace consolidation. Impression:     COPD without focal airspace consolidation. An element of volume overload is suspected. CBC with DIFF [[id number]] Collected: 05/28/19 2020   Updated: 05/28/19 2057    Specimen Source: Blood     WBC 10.1 k/uL    RBC 4.25 m/uL    Hemoglobin 14.0 g/dL    Hematocrit 43.6 %    .6 fL    MCH 32.9 pg    MCHC 32.1 g/dL    RDW 13.6 %    Platelets 299 k/uL    MPV 9.2 fL    NRBC Automated NOT REPORTED per 100 WBC    Differential Type NOT REPORTED    WBC Morphology NOT REPORTED    RBC Morphology NOT REPORTED    Platelet Estimate NOT REPORTED    Seg Neutrophils 53 %    Lymphocytes 35 %    Monocytes 10 %    Eosinophils % 2 %    Basophils 1 %    Immature Granulocytes 0 %    Segs Absolute 5.29 k/uL    Absolute Lymph # 3.55 k/uL    Absolute Mono # 0.95 k/uL    Absolute Eos # 0.18 k/uL    Basophils # 0.07 k/uL    Absolute Immature Granulocyte 0.01 k/uL   Basic Metabolic Prof [[id number]] (Abnormal) Collected: 05/28/19 2020   Updated: 05/28/19 2057    Specimen Source: Blood     Glucose 108High  mg/dL    BUN 8 mg/dL    CREATININE 0.85 mg/dL    Bun/Cre Ratio 9    Calcium 9.6 mg/dL    Sodium 139 mmol/L    Potassium 2.8Low Panic   mmol/L    Chloride 101 mmol/L    CO2 21 mmol/L    Anion Gap 17 mmol/L    GFR Non-African American >60 mL/min    GFR African American >60 mL/min    GFR Comment         Comment: Average GFR for 61-76 years old:    85 mL/min/1.73sq m   Chronic Kidney Disease:    <60 mL/min/1.73sq m   Kidney failure:    <15 mL/min/1.73sq m               eGFR calculated using average adult body mass.  Additional eGFR calculator available at:         [email or web address]              GFR Staging NOT REPORTED   Magnesium [[id number]] Collected: 05/28/19 2020   Updated: 05/28/19 2057    Specimen Source: Blood     Magnesium 1.8 mg/dL   Trop/Myoglobin [[id number]] Collected: 05/28/19 2020   Updated: 05/28/19 2057    Specimen Source: Blood     Troponin, High Sensitivity 8 ng/L    Comment:        High Sensitivity Troponin values cannot be compared with other Troponin methodologies.         Patients with high levels of Biotin oral intake (i.e >5mg/day) may have falsely decreased   Troponin levels. Samples collected within 8 hours of biotin intake may require additional   information for diagnosis. Troponin T NOT REPORTED ng/mL    Troponin Interp NOT REPORTED    Myoglobin 48 ng/mL   Brain Natriuretic Peptide [[id number]] (Abnormal) Collected: 05/28/19 2020   Updated: 05/28/19 2057    Specimen Source: Blood     Pro-BNP 467High  pg/mL    Comment:  Pro-BNP results cannot be compared to BNP results. BNP Interpretation Pro-BNP Reference Range:    Comment:                  Assessment and Plan   1. AECOPD  2. DuoNeb  3. IV Abx  4. IV Solumedrol  5. O2NC  6. Presently at 3L  7. Per nursing O2 drifted to 86% when pt was asleep  8. Hypoxia  9. CAD  10. ANxiety  11. OA  12. HTN  13. Urge incontinence  14. Resume ess meds  15. Recheck labs  16. IS  17. PPI  18. Lovenox  19. Pt eager to go home  20. Advised caution since still hypoxic and on high rate O2  21. Nicotine dependence  22. Nicoderm  23. Counseled on smoking cessation  24.  Cont close follow up    Patient Active Problem List   Diagnosis Code    Cellulitis and abscess of face L03.211, L02.01    Acute exacerbation of chronic obstructive pulmonary disease [facility]) ([facility] J44.1       Electronically signed by Angelica Anderson MD on 5/29/2019 at 7:24 AM

## 2019-05-29 NOTE — PROGRESS NOTES
Stairs - Number of Steps: 3  Bathroom Shower/Tub: Tub/Shower unit  Bathroom Toilet: Standard  Bathroom Equipment: Shower chair, Grab bars in shower  Home Equipment: [employer] From: Friend(s)  ADL Assistance: [address]: Independent  Homemaking Responsibilities: Yes  Ambulation Assistance: Independent  Transfer Assistance: Independent  Active : Yes  Mode of Transportation: Car  Additional Comments: Patient reports one fall at home in the past       Objective   Vision: Impaired  Vision Exceptions: Wears glasses at all times  Hearing: Within functional limits    Orientation  Overall Orientation Status: Within Functional Limits  Observation/Palpation  Posture: Fair  Observation: pt needs redirection at time.  At times impulsive and talkative   Balance  Sitting Balance: Supervision  Standing Balance: Contact guard assistance  Functional Mobility  Functional - Mobility Device: No device  Activity: To/from bathroom  Assist Level: Minimal assistance  Functional Mobility Comments: CGA/min A for mob to/from bathroom with cues for safety with O2 tubing and general fall prevention techs  Toilet Transfers  Equipment Used: Standard toilet  Toilet Transfer: Contact guard assistance  ADL  Feeding: Independent  Grooming: Contact guard assistance(to stand at sink for grooming following toileting)  UE Bathing: Stand by assistance  LE Bathing: Contact guard assistance  UE Dressing: Stand by assistance  LE Dressing: Contact guard assistance  Toileting: Contact guard assistance;Stand by assistance  Tone RUE  RUE Tone: Normotonic  Tone LUE  LUE Tone: Normotonic  Coordination  Movements Are Fluid And Coordinated: Yes     Bed mobility  Bridging: Contact guard assistance  Rolling to Left: Contact guard assistance  Supine to Sit: Contact guard assistance  Sit to Supine: Contact guard assistance  Scooting: Contact guard assistance  Transfers  Sit to stand: Contact guard assistance  Stand to sit: Contact

## 2019-05-29 NOTE — ED PROVIDER NOTES
[address] [place] [address] ED  eMERGENCY dEPARTMENT eNCOUnter      Pt Name: Kyung Little  MRN: [id number]  [place] 1950  Date of evaluation: 5/28/2019  Provider: Judd Clinton       Chief Complaint   Patient presents with    Cough    Shortness of Breath     past 5 days         HISTORY OFPRESENT ILLNESS  (Location/Symptom, Timing/Onset, Context/Setting, Quality, Duration, Modifying Factors, Severity.)   Kyung Little is a 80 y.o. female who presents to the emergency department by private auto for evaluation of productive cough and worsening shortness of breath for the past 5 days. Patient has history of COPD. She is current daily smoker. She denies chest pain. Patient states she has used her inhalers at home without relief. Nursing Notes were reviewed. PASTMEDICAL HISTORY     Past Medical History:   Diagnosis Date    Asthma     Osteoarthritis          SURGICAL HISTORY     History reviewed. No pertinent surgical history. CURRENT MEDICATIONS     Previous Medications    ALBUTEROL (PROVENTIL) (2.5 MG/3ML) 0.083% NEBULIZER SOLUTION    Take 2.5 mg by nebulization every 6 hours as needed for Wheezing    AMITRIPTYLINE (ELAVIL) 25 MG TABLET    Take 25 mg by mouth nightly    BUDESONIDE-FORMOTEROL (SYMBICORT) 160-4.5 MCG/ACT AERO    Inhale 2 puffs into the lungs 2 times daily    BUSPIRONE (BUSPAR) 10 MG TABLET    Take 7.5 mg by mouth 2 times daily    CYCLOBENZAPRINE (FLEXERIL) 5 MG TABLET    Take 5 mg by mouth 3 times daily as needed for Muscle spasms    FLUTICASONE (FLONASE) 50 MCG/ACT NASAL SPRAY    1 spray by Each Nostril route daily    FLUTICASONE-SALMETEROL (ADVAIR HFA) 230-21 MCG/ACT INHALER    Inhale 2 puffs into the lungs 2 times daily    FUROSEMIDE (LASIX) 40 MG TABLET    Take 40 mg by mouth daily    GABAPENTIN (NEURONTIN) 600 MG TABLET    Take 600 mg by mouth 2 times daily.     HYDROCODONE-ACETAMINOPHEN (NORCO) 5-325 MG PER TABLET    Take 1 tablet by mouth every 6 hours as needed for Pain    ISOSORBIDE MONONITRATE (IMDUR) 30 MG EXTENDED RELEASE TABLET    Take 30 mg by mouth daily    MELOXICAM (MOBIC) 15 MG TABLET    Take 15 mg by mouth daily    METOPROLOL SUCCINATE (TOPROL XL) 25 MG EXTENDED RELEASE TABLET    Take 25 mg by mouth daily    MONTELUKAST (SINGULAIR) 10 MG TABLET    Take 10 mg by mouth nightly    MULTIPLE VITAMINS-MINERALS (THERAPEUTIC MULTIVITAMIN-MINERALS) TABLET    Take 1 tablet by mouth daily    OMEPRAZOLE (PRILOSEC) 20 MG DELAYED RELEASE CAPSULE    Take 40 mg by mouth daily    SOLIFENACIN (VESICARE) 10 MG TABLET    Take 5 mg by mouth daily       ALLERGIES     Sulfa antibiotics    FAMILY HISTORY     History reviewed. No pertinent family history.        SOCIAL HISTORY       Social History     Socioeconomic History    Marital status: Single     Spouse name: None    Number of children: None    Years of education: None    Highest education level: None   Occupational History    None   Social Needs    Financial resource strain: None    Food insecurity:     Worry: None     Inability: None    Transportation needs:     Medical: None     Non-medical: None   Tobacco Use    Smoking status: Current Every Day Smoker     Packs/day: 1.00     Years: 40.00     Pack years: 40.00     Types: Cigarettes   Substance and Sexual Activity    Alcohol use: No     Alcohol/week: 0.0 oz    Drug use: No    Sexual activity: None   Lifestyle    Physical activity:     Days per week: None     Minutes per session: None    Stress: None   Relationships    Social connections:     Talks on phone: None     Gets together: None     Attends Evangelical service: None     Active member of club or organization: None     Attends meetings of clubs or organizations: None     Relationship status: None    Intimate partner violence:     Fear of current or ex partner: None     Emotionally abused: None     Physically abused: None     Forced sexual activity: None   Other Topics Concern    None   Social History Narrative  None         REVIEW OF SYSTEMS    (2-9 systems for level 4, 10 or more for level 5)     Review of Systems   Constitutional: Positive for activity change. Respiratory: Positive for cough, chest tightness, shortness of breath and wheezing. Cardiovascular: Negative for chest pain. All other systems reviewed and are negative. Except as noted above the remainder of the review of systems was reviewed and negative. PHYSICAL EXAM    (up to 7 for level 4, 8 or more for level 5)     ED Triage Vitals [05/28/19 1954]   BP Temp Temp Source Pulse Resp SpO2 Height Weight   94/65 97.5 °F (36.4 °C) Oral 65 26 (!) 0 % 5' 3\" (1.6 m) 165 lb (74.8 kg)       Physical Exam   Constitutional: She is oriented to person, place, and time. She appears well-developed and well-nourished. HENT:   Head: Normocephalic and atraumatic. Right Ear: Hearing and external ear normal.   Left Ear: Hearing and external ear normal.   Nose: Nose normal.   Mouth/Throat: Uvula is midline, oropharynx is clear and moist and mucous membranes are normal.   Eyes: Pupils are equal, round, and reactive to light. Conjunctivae, EOM and lids are normal.   Neck: Normal range of motion and full passive range of motion without pain. Neck supple. Cardiovascular: Normal rate, regular rhythm, S1 normal, S2 normal, normal heart sounds, intact distal pulses and normal pulses. Pulmonary/Chest: Tachypnea noted. She has decreased breath sounds in the right upper field, the right middle field, the right lower field, the left upper field, the left middle field and the left lower field. Abdominal: Soft. Normal appearance and bowel sounds are normal. There is no tenderness. Musculoskeletal: Normal range of motion. Neurological: She is alert and oriented to person, place, and time. She has normal strength. No cranial nerve deficit or sensory deficit. GCS eye subscore is 4. GCS verbal subscore is 5. GCS motor subscore is 6.    Skin: Skin is warm, dry and intact. Capillary refill takes less than 2 seconds. Psychiatric: She has a normal mood and affect. Her behavior is normal. Judgment and thought content normal.         DIAGNOSTIC RESULTS     EKG:All EKG's are interpreted by the Emergency Department Physician who either signs or Co-signs this chart in the absence of a cardiologist.    EKG interpreted by attending physician    RADIOLOGY:   Non-plain film images such as CT, Ultrasound and MRI are read by theradiologist. Plain radiographic images are visualized and preliminarily interpreted by the emergency physician with the below findings:    Xr Chest Portable    Result Date: 5/28/2019  EXAMINATION: ONE XRAY VIEW OF THE CHEST 5/28/2019 8:59 pm COMPARISON: None. HISTORY: ORDERING SYSTEM PROVIDED HISTORY: SOB TECHNOLOGIST PROVIDED HISTORY: SOB Ordering Physician Provided Reason for Exam: Pt states sob, chest pain, cough since today. Hx of COPD Acuity: Acute Type of Exam: Initial Relevant Medical/Surgical History: COPD FINDINGS: Cardiomegaly with central vascular congestion. Prominent pulmonary markings indicate interstitial lung disease. No effusion. No pneumothorax. No airspace consolidation. COPD without focal airspace consolidation. An element of volume overload is suspected. Interpretation per the Radiologist below, if available at the time of this note:    XR CHEST PORTABLE   Final Result   COPD without focal airspace consolidation. An element of volume overload is suspected. EDBEDSIDE ULTRASOUND:   Performed by Franchesca Sensing - none    LABS:  [unfilled]    All other labs were within normal range or not returned as of this dictation. EMERGENCY DEPARTMENT COURSE andDIFFERENTIAL DIAGNOSIS/MDM:   The patient was evaluated in conjunction with attending physician. She is given aerosol treatments, IV steroids and fluids upon arrival.  She is still short of breath. Labs reviewed. Potassium 2.8. Magnesium 1.8. Troponin 8. .
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
## 2019-05-29 NOTE — PROGRESS NOTES
· Bronchodilator assessment     [x]    Bronchodilator Assessment    PEFR  264  PEFR % Predicted    77%  FEV1 Actual    1.8  FEV1 % Predicted    81%  RR  17  FIO2   2L  (As at home.)   SaO2    93%  BS  Clear t/o    Bronchodilator assessment at level    1  BRONCHODILATOR ASSESSMENT SCORE  Score 1 2 3 4   Breath Sounds   [x]  Clear []  Mild Wheezing with good aeration []  Moderate I/E wheezing with adequate aeration []  Poor Aeration or diffuse wheezing   Respiratory Rate [x]  Less than 20 []  20-25 []  Greater than 25  []  Greater than 35    Dyspnea []  No SOB  [x]  SOB with minimal activity []  Speaking in partial sentences []  Acute/ At rest   Peakflow (asthma) []  80 % or greater predicted/PB  []  Unable [x]  70% or greater predicted/PB  []  Unable []  51%-70% predicted/PB  []  Unable []  Less than 50% predicted/PB  []  Unable due to distress   FEV1 % Predicted [x]  Greater than 69%  []  Unable  []  Less than 50%-69%  []  Unable  []  Less than 35%-49%  []  Unable  []  Less than 35%  []  Unable due to distress     · Bronchodilator assessment     [x]    Bronchodilator Assessment    PEFR    294  PEFR % Predicted   86%  FEV1 actual:   1.8  FEV1 % PREDICTED   81%  RR   16  BS  Clear t/o      Bronchodilator assessment at level   1  BRONCHODILATOR ASSESSMENT SCORE  Score 1 2 3 4   Breath Sounds   [x]  Clear []  Mild Wheezing with good aeration []  Moderate I/E wheezing with adequate aeration []  Poor Aeration or diffuse wheezing   Respiratory Rate [x]  Less than 20 []  20-25 []  Greater than 25  []  Greater than 35    Dyspnea [x]  No SOB  []  SOB with minimal activity []  Speaking in partial sentences []  Acute/ At rest   Peakflow (asthma) [x]  80 % or greater predicted/PB  []  Unable []  70% or greater predicted/PB  []  Unable []  51%-70% predicted/PB  []  Unable []  Less than 50% predicted/PB  []  Unable due to distress   FEV1 % Predicted [x]  Greater than 69%  []  Unable  []  Less than 50%-69%  []  Unable  []  Less

## 2019-05-29 NOTE — PLAN OF CARE
Problem: Falls - Risk of:  Goal: Will remain free from falls  Description  Will remain free from falls  Outcome: Met This Shift  Patient is a fall risk during this admission. Fall risk assessment was performed. Patient is absent of falls. Bed is in the lowest position. Wheels on the bed are locked. Call light and bed side table are within reach. Clutter is removed. Patient was educated to call out when needing assistance or wanting to get out of bed. Patient offered toileting assistance during rounding. Hourly rounds have been performed. Problem: Activity:  Goal: Fatigue will decrease  Description  Fatigue will decrease  Outcome: Met This Shift  Pt verbalized decreased feelings of short of breath .        Problem: Respiratory:  Goal: Ability to maintain a clear airway will improve  Description  Ability to maintain a clear airway will improve  Outcome: Met This Shift  Pt continues to require continuous oxygen    Goal: Ability to maintain adequate ventilation will improve  Description  Ability to maintain adequate ventilation will improve  Outcome: Met This Shift     Problem: Skin Integrity:  Goal: Risk for impaired skin integrity will decrease  Description  Risk for impaired skin integrity will decrease  Outcome: Met This Shift

## 2019-05-29 NOTE — ED PROVIDER NOTES
eMERGENCY dEPARTMENT eNCOUnter   Attending Attestation     Pt Name: Ezra Lee  MRN: [id number]  [place] 1950  Date of evaluation: 5/28/19   Ezra Lee is a 80 y.o. female with CC: Cough and Shortness of Breath (past 5 days)    MDM:     I spoke with Dr. Kimberlee Miller who was in agreement with plan to admit patient for COPD exacerbation for continuous albuterol. Pt also given magnesium and furosemide. CRITICAL CARE:       EKG: All EKG's are interpreted by the Emergency Department Physician who either signs or Co-signs this chart in the absence of a cardiologist.    Normal sinus rhythm ventricular rate of 66 normal axis unremarkable ST segment. RADIOLOGY:All plain film, CT, MRI, and formal ultrasound images (except ED bedside ultrasound) are read by the radiologist, see reports below, unless otherwise noted in MDM or here. XR CHEST PORTABLE   Final Result   COPD without focal airspace consolidation. An element of volume overload is suspected. LABS: All lab results were reviewed by myself, and all abnormals are listed below. Labs Reviewed   BASIC METABOLIC PANEL - Abnormal; Notable for the following components:       Result Value    Glucose 108 (*)     Potassium 2.8 (*)     All other components within normal limits   BRAIN NATRIURETIC PEPTIDE - Abnormal; Notable for the following components:    Pro- (*)     All other components within normal limits   CBC WITH AUTO DIFFERENTIAL   MAGNESIUM   TROP/MYOGLOBIN   TROP/MYOGLOBIN           I personally evaluated and examined the patient in conjunction with the APC and agree with the assessment, treatment plan, and disposition of the patient as recorded by the APC.    Natalia Melendez MD  Attending Emergency Physician          Swati Luo MD  05/28/19 5468

## 2019-05-29 NOTE — PROGRESS NOTES
Pt admitted to 2031 from ER via stretcher with Chuck Pierre. Verbal report received from ER RN. Pt transferred to bed with her cane. Telemetry monitor applied. Oxygen at  2L nasal cannula. VS as charted. No s/s of distress at this time. Pt oriented to unit, room, call light and bed controls. Verbal safety instructions provided- pt verbalized understanding. Informed pt of plan of care. Pt denies needs. Will monitor pt closely.

## 2019-05-29 NOTE — PLAN OF CARE
Problem: Falls - Risk of:  Goal: Will remain free from falls  Description  Will remain free from falls  Outcome: Ongoing     Problem: Falls - Risk of:  Goal: Absence of physical injury  Description  Absence of physical injury  Outcome: Ongoing     Problem:  Activity:  Goal: Fatigue will decrease  Description  Fatigue will decrease  Outcome: Ongoing     Problem: Respiratory:  Goal: Ability to maintain a clear airway will improve  Description  Ability to maintain a clear airway will improve  Outcome: Ongoing     Problem: Respiratory:  Goal: Ability to maintain adequate ventilation will improve  Description  Ability to maintain adequate ventilation will improve  Outcome: Ongoing     Problem: Respiratory:  Goal: Complications related to the disease process, condition or treatment will be avoided or minimized  Description  Complications related to the disease process, condition or treatment will be avoided or minimized  Outcome: Ongoing     Problem: Skin Integrity:  Goal: Risk for impaired skin integrity will decrease  Description  Risk for impaired skin integrity will decrease  Outcome: Ongoing

## 2019-05-30 ENCOUNTER — APPOINTMENT (OUTPATIENT)
Dept: GENERAL RADIOLOGY | Age: 69
DRG: 190 | End: 2019-05-30
Payer: MEDICARE

## 2019-05-30 PROBLEM — F41.1 GENERALIZED ANXIETY DISORDER: Status: ACTIVE | Noted: 2019-05-30

## 2019-05-30 PROBLEM — I25.10 CORONARY ARTERY DISEASE INVOLVING NATIVE CORONARY ARTERY OF NATIVE HEART WITHOUT ANGINA PECTORIS: Status: ACTIVE | Noted: 2019-05-30

## 2019-05-30 PROBLEM — G89.29 CHRONIC BILATERAL LOW BACK PAIN WITHOUT SCIATICA: Status: ACTIVE | Noted: 2019-05-30

## 2019-05-30 PROBLEM — I10 BENIGN ESSENTIAL HTN: Status: ACTIVE | Noted: 2019-05-30

## 2019-05-30 PROBLEM — M54.50 CHRONIC BILATERAL LOW BACK PAIN WITHOUT SCIATICA: Status: ACTIVE | Noted: 2019-05-30

## 2019-05-30 LAB
ABSOLUTE EOS #: 0 K/UL (ref 0–0.4)
ABSOLUTE IMMATURE GRANULOCYTE: 0.27 K/UL (ref 0–0.3)
ABSOLUTE LYMPH #: 1.09 K/UL (ref 1–4.8)
ABSOLUTE MONO #: 0.55 K/UL (ref 0.2–0.8)
ANION GAP SERPL CALCULATED.3IONS-SCNC: 9 MMOL/L (ref 9–17)
BASOPHILS # BLD: 0 %
BASOPHILS ABSOLUTE: 0 K/UL (ref 0–0.2)
BUN BLDV-MCNC: 15 MG/DL (ref 8–23)
BUN/CREAT BLD: 27 (ref 9–20)
CALCIUM SERPL-MCNC: 9.7 MG/DL (ref 8.6–10.4)
CHLORIDE BLD-SCNC: 100 MMOL/L (ref 98–107)
CO2: 27 MMOL/L (ref 20–31)
CREAT SERPL-MCNC: 0.56 MG/DL (ref 0.5–0.9)
DIFFERENTIAL TYPE: ABNORMAL
EOSINOPHILS RELATIVE PERCENT: 0 % (ref 1–4)
GFR AFRICAN AMERICAN: >60 ML/MIN
GFR NON-AFRICAN AMERICAN: >60 ML/MIN
GFR SERPL CREATININE-BSD FRML MDRD: ABNORMAL ML/MIN/{1.73_M2}
GFR SERPL CREATININE-BSD FRML MDRD: ABNORMAL ML/MIN/{1.73_M2}
GLUCOSE BLD-MCNC: 161 MG/DL (ref 70–99)
HCT VFR BLD CALC: 41.1 % (ref 36.3–47.1)
HEMOGLOBIN: 13.1 G/DL (ref 11.9–15.1)
IMMATURE GRANULOCYTES: 1 %
LYMPHOCYTES # BLD: 4 % (ref 24–44)
MCH RBC QN AUTO: 33.4 PG (ref 25.2–33.5)
MCHC RBC AUTO-ENTMCNC: 31.9 G/DL (ref 28.4–34.8)
MCV RBC AUTO: 104.8 FL (ref 82.6–102.9)
MONOCYTES # BLD: 2 % (ref 1–7)
NRBC AUTOMATED: 0 PER 100 WBC
PDW BLD-RTO: 14.3 % (ref 11.8–14.4)
PLATELET # BLD: 407 K/UL (ref 138–453)
PLATELET ESTIMATE: ABNORMAL
PMV BLD AUTO: 9.6 FL (ref 8.1–13.5)
POTASSIUM SERPL-SCNC: 4.7 MMOL/L (ref 3.7–5.3)
RBC # BLD: 3.92 M/UL (ref 3.95–5.11)
RBC # BLD: ABNORMAL 10*6/UL
SEG NEUTROPHILS: 93 % (ref 36–66)
SEGMENTED NEUTROPHILS ABSOLUTE COUNT: 25.39 K/UL (ref 1.8–7.7)
SODIUM BLD-SCNC: 136 MMOL/L (ref 135–144)
WBC # BLD: 27.3 K/UL (ref 3.5–11.3)
WBC # BLD: ABNORMAL 10*3/UL

## 2019-05-30 PROCEDURE — 71045 X-RAY EXAM CHEST 1 VIEW: CPT

## 2019-05-30 PROCEDURE — 2060000000 HC ICU INTERMEDIATE R&B

## 2019-05-30 PROCEDURE — 6360000002 HC RX W HCPCS: Performed by: INTERNAL MEDICINE

## 2019-05-30 PROCEDURE — 94640 AIRWAY INHALATION TREATMENT: CPT

## 2019-05-30 PROCEDURE — 6370000000 HC RX 637 (ALT 250 FOR IP): Performed by: FAMILY MEDICINE

## 2019-05-30 PROCEDURE — 2580000003 HC RX 258: Performed by: FAMILY MEDICINE

## 2019-05-30 PROCEDURE — 6360000002 HC RX W HCPCS: Performed by: FAMILY MEDICINE

## 2019-05-30 PROCEDURE — 85025 COMPLETE CBC W/AUTO DIFF WBC: CPT

## 2019-05-30 PROCEDURE — 36415 COLL VENOUS BLD VENIPUNCTURE: CPT

## 2019-05-30 PROCEDURE — 6370000000 HC RX 637 (ALT 250 FOR IP): Performed by: INTERNAL MEDICINE

## 2019-05-30 PROCEDURE — 80048 BASIC METABOLIC PNL TOTAL CA: CPT

## 2019-05-30 RX ORDER — ATORVASTATIN CALCIUM 40 MG/1
40 TABLET, FILM COATED ORAL DAILY
COMMUNITY

## 2019-05-30 RX ORDER — METHYLPREDNISOLONE SODIUM SUCCINATE 40 MG/ML
40 INJECTION, POWDER, LYOPHILIZED, FOR SOLUTION INTRAMUSCULAR; INTRAVENOUS EVERY 8 HOURS
Status: DISCONTINUED | OUTPATIENT
Start: 2019-05-30 | End: 2019-05-31

## 2019-05-30 RX ORDER — DIPHENHYDRAMINE HCL 25 MG
25 TABLET ORAL NIGHTLY PRN
Status: ON HOLD | COMMUNITY
End: 2019-06-01 | Stop reason: HOSPADM

## 2019-05-30 RX ORDER — SERTRALINE HYDROCHLORIDE 25 MG/1
25 TABLET, FILM COATED ORAL DAILY
COMMUNITY

## 2019-05-30 RX ORDER — OXYCODONE HYDROCHLORIDE AND ACETAMINOPHEN 5; 325 MG/1; MG/1
1 TABLET ORAL EVERY 8 HOURS PRN
COMMUNITY

## 2019-05-30 RX ADMIN — OXYCODONE AND ACETAMINOPHEN 1 TABLET: 5; 325 TABLET ORAL at 09:35

## 2019-05-30 RX ADMIN — METHYLPREDNISOLONE SODIUM SUCCINATE 40 MG: 40 INJECTION, POWDER, FOR SOLUTION INTRAMUSCULAR; INTRAVENOUS at 14:00

## 2019-05-30 RX ADMIN — OXYCODONE AND ACETAMINOPHEN 1 TABLET: 5; 325 TABLET ORAL at 03:25

## 2019-05-30 RX ADMIN — POTASSIUM CHLORIDE 20 MEQ: 20 TABLET, EXTENDED RELEASE ORAL at 07:55

## 2019-05-30 RX ADMIN — GABAPENTIN 600 MG: 300 CAPSULE ORAL at 07:55

## 2019-05-30 RX ADMIN — DOXYCYCLINE 100 MG: 100 CAPSULE ORAL at 21:27

## 2019-05-30 RX ADMIN — PANTOPRAZOLE SODIUM 40 MG: 40 TABLET, DELAYED RELEASE ORAL at 06:14

## 2019-05-30 RX ADMIN — BUSPIRONE HYDROCHLORIDE 7.5 MG: 5 TABLET ORAL at 07:56

## 2019-05-30 RX ADMIN — METOPROLOL SUCCINATE 25 MG: 25 TABLET, FILM COATED, EXTENDED RELEASE ORAL at 07:56

## 2019-05-30 RX ADMIN — ENOXAPARIN SODIUM 40 MG: 40 INJECTION SUBCUTANEOUS at 07:56

## 2019-05-30 RX ADMIN — MOMETASONE FUROATE AND FORMOTEROL FUMARATE DIHYDRATE 2 PUFF: 200; 5 AEROSOL RESPIRATORY (INHALATION) at 11:11

## 2019-05-30 RX ADMIN — FUROSEMIDE 40 MG: 40 TABLET ORAL at 07:55

## 2019-05-30 RX ADMIN — CEFTRIAXONE SODIUM 1 G: 1 INJECTION, POWDER, FOR SOLUTION INTRAMUSCULAR; INTRAVENOUS at 23:11

## 2019-05-30 RX ADMIN — Medication 10 ML: at 21:27

## 2019-05-30 RX ADMIN — OXYCODONE AND ACETAMINOPHEN 1 TABLET: 5; 325 TABLET ORAL at 17:34

## 2019-05-30 RX ADMIN — IPRATROPIUM BROMIDE AND ALBUTEROL SULFATE 1 AMPULE: .5; 3 SOLUTION RESPIRATORY (INHALATION) at 15:04

## 2019-05-30 RX ADMIN — IPRATROPIUM BROMIDE AND ALBUTEROL SULFATE 1 AMPULE: .5; 3 SOLUTION RESPIRATORY (INHALATION) at 11:10

## 2019-05-30 RX ADMIN — ISOSORBIDE MONONITRATE 30 MG: 30 TABLET ORAL at 07:56

## 2019-05-30 RX ADMIN — METHYLPREDNISOLONE SODIUM SUCCINATE 80 MG: 125 INJECTION, POWDER, FOR SOLUTION INTRAMUSCULAR; INTRAVENOUS at 04:03

## 2019-05-30 RX ADMIN — BUSPIRONE HYDROCHLORIDE 7.5 MG: 5 TABLET ORAL at 21:28

## 2019-05-30 RX ADMIN — Medication 10 ML: at 07:59

## 2019-05-30 RX ADMIN — DOXYCYCLINE 100 MG: 100 CAPSULE ORAL at 07:56

## 2019-05-30 RX ADMIN — MOMETASONE FUROATE AND FORMOTEROL FUMARATE DIHYDRATE 2 PUFF: 200; 5 AEROSOL RESPIRATORY (INHALATION) at 19:01

## 2019-05-30 RX ADMIN — MONTELUKAST SODIUM 10 MG: 10 TABLET, FILM COATED ORAL at 21:27

## 2019-05-30 RX ADMIN — FLUTICASONE PROPIONATE 1 SPRAY: 50 SPRAY, METERED NASAL at 08:03

## 2019-05-30 RX ADMIN — METHYLPREDNISOLONE SODIUM SUCCINATE 40 MG: 40 INJECTION, POWDER, FOR SOLUTION INTRAMUSCULAR; INTRAVENOUS at 21:27

## 2019-05-30 RX ADMIN — GABAPENTIN 600 MG: 300 CAPSULE ORAL at 21:27

## 2019-05-30 RX ADMIN — IPRATROPIUM BROMIDE AND ALBUTEROL SULFATE 1 AMPULE: .5; 3 SOLUTION RESPIRATORY (INHALATION) at 19:01

## 2019-05-30 ASSESSMENT — PAIN DESCRIPTION - PAIN TYPE
TYPE: CHRONIC PAIN

## 2019-05-30 ASSESSMENT — PAIN SCALES - GENERAL
PAINLEVEL_OUTOF10: 4
PAINLEVEL_OUTOF10: 0
PAINLEVEL_OUTOF10: 5
PAINLEVEL_OUTOF10: 0
PAINLEVEL_OUTOF10: 3
PAINLEVEL_OUTOF10: 3
PAINLEVEL_OUTOF10: 6
PAINLEVEL_OUTOF10: 4

## 2019-05-30 ASSESSMENT — ENCOUNTER SYMPTOMS
DIARRHEA: 0
COUGH: 1
SHORTNESS OF BREATH: 1

## 2019-05-30 ASSESSMENT — PAIN DESCRIPTION - DESCRIPTORS: DESCRIPTORS: DISCOMFORT

## 2019-05-30 ASSESSMENT — PAIN DESCRIPTION - LOCATION
LOCATION: BACK
LOCATION: NECK;KNEE
LOCATION: NECK

## 2019-05-30 ASSESSMENT — PAIN DESCRIPTION - ORIENTATION: ORIENTATION: LEFT

## 2019-05-30 NOTE — PLAN OF CARE
Problem: Falls - Risk of:  Goal: Will remain free from falls  Description  Will remain free from falls  Outcome: Met This Shift     Problem:  Activity:  Goal: Fatigue will decrease  Description  Fatigue will decrease  Outcome: Met This Shift     Problem: Respiratory:  Goal: Ability to maintain a clear airway will improve  Description  Ability to maintain a clear airway will improve  Outcome: Met This Shift     Problem: Skin Integrity:  Goal: Risk for impaired skin integrity will decrease  Description  Risk for impaired skin integrity will decrease  Outcome: Met This Shift     Problem: Pain:  Goal: Pain level will decrease  Description  Pain level will decrease  Outcome: Met This Shift

## 2019-05-30 NOTE — PROGRESS NOTES
Pulmonary Critical Care Progress Note  Per Bailey MD     Patient seen for the follow up of acute on chronic hypoxic respiratory failure, tracheobronchitis, active smoking, Acute exacerbation of chronic obstructive pulmonary disease (COPD) ([address]     Subjective:  She denies chest pain. Mild occasional cough, mostly dry. Shortness of breath is getting better. No significant overnight events. She is tolerating orals. Examination:  Vitals: BP (!) 118/50   Pulse 65   Temp 98.8 °F (37.1 °C) (Temporal)   Resp 18   Ht 5' 3\" (1.6 m)   Wt 165 lb (74.8 kg)   SpO2 97%   BMI 29.23 kg/m²   General appearance: alert and cooperative with exam  Neck: No JVD  Lungs: Moderate air exchange with bilateral wheezing better than yesterday  Heart: regular rate and rhythm, S1, S2 normal, no gallop  Abdomen: Soft, non tender, + BS  Extremities: no cyanosis or clubbing.  No significant edema    LABs:  CBC:   Recent Labs     05/29/19  0504 05/30/19  0447   WBC 7.0 27.3*   HGB 14.5 13.1   HCT 44.6 41.1    407     BMP:   Recent Labs     05/29/19  0504 05/30/19  0447    136   K 3.9 4.7   CO2 27 27   BUN 8 15   CREATININE 0.62 0.56   LABGLOM >60 >60   GLUCOSE 189* 161*     Radiology:  5/30      Impression:  · Acute on chronic hypoxic respiratory failure  · Acute exacerbation of COPD  · Tracheo-bronchitis  · Active smoking,> 50-pack-year smoking    Recommendations:  · Continue IV antibiotics  · IV solu-medrol, decrease dose and frequency  · Albuterol and Ipratropium Q 4 hours and prn  · Dulera 200  · Singulair   · Nicotine patch   · Labs: CBC and BMP in am  · BiPAP  · 2 liters/min via nasal cannula  · DVT prophylaxis with low molecular weight heparin  · Will follow with you    Per Bailey MD, [facility]  Pulmonary Critical Care and Sleep Medicine,  [facility]  Cell: [phone]  Office: [phone]

## 2019-05-30 NOTE — PROGRESS NOTES
Transitions of Care Pharmacy Service   Medication Review    The patient's list of current home medications has been reviewed. Source(s) of information: patient, Surescripts report      Please feel free to call me with any questions about this encounter. Thank you. Maikel Harrell, [facility]   Transitions of Care Pharmacy Service  Phone:  [phone]  Fax: [phone]      Electronically signed by Maikel Harrell [facility] on 5/30/2019 at 11:05 AM           Medications Prior to Admission:   diphenhydrAMINE (BENADRYL) 25 MG tablet, Take 25 mg by mouth nightly as needed for Itching  atorvastatin (LIPITOR) 40 MG tablet, Take 40 mg by mouth daily  diclofenac sodium 1 % GEL, Apply 4 g topically 3 times daily To knees, hips, spine, neck, arms  sertraline (ZOLOFT) 25 MG tablet, Take 25 mg by mouth daily  tiotropium (SPIRIVA RESPIMAT) 2.5 MCG/ACT AERS inhaler, Inhale 2 puffs into the lungs daily  oxyCODONE-acetaminophen (PERCOCET) 5-325 MG per tablet, Take 1 tablet by mouth every 8 hours as needed for Pain. busPIRone (BUSPAR) 7.5 MG tablet, Take 7.5 mg by mouth 2 times daily   fluticasone (FLONASE) 50 MCG/ACT nasal spray, 1 spray by Each Nostril route daily  furosemide (LASIX) 40 MG tablet, Take 40 mg by mouth daily  gabapentin (NEURONTIN) 600 MG tablet, Take 600 mg by mouth 3 times daily.    isosorbide mononitrate (IMDUR) 30 MG extended release tablet, Take 30 mg by mouth daily  metoprolol succinate (TOPROL XL) 25 MG extended release tablet, Take 25 mg by mouth daily  montelukast (SINGULAIR) 10 MG tablet, Take 10 mg by mouth every morning   omeprazole (PRILOSEC) 40 MG delayed release capsule, Take 40 mg by mouth daily   budesonide-formoterol (SYMBICORT) 160-4.5 MCG/ACT AERO, Inhale 2 puffs into the lungs 2 times daily  amitriptyline (ELAVIL) 25 MG tablet, Take 25 mg by mouth nightly as needed for Sleep   albuterol (PROVENTIL) (2.5 MG/3ML) 0.083% nebulizer solution, Take 2.5 mg by nebulization every 6 hours as needed for Wheezing  Multiple Vitamins-Minerals (THERAPEUTIC MULTIVITAMIN-MINERALS) tablet, Take 1 tablet by mouth daily

## 2019-05-30 NOTE — PLAN OF CARE
Problem: Falls - Risk of:  Goal: Will remain free from falls  Description  Will remain free from falls  5/30/2019 0023 by Joy Capellan RN  Outcome: Ongoing     Problem:  Activity:  Goal: Fatigue will decrease  Description  Fatigue will decrease  5/30/2019 0023 by Joy Capellan RN  Outcome: Ongoing

## 2019-05-31 LAB
ABSOLUTE EOS #: 0 K/UL (ref 0–0.44)
ABSOLUTE IMMATURE GRANULOCYTE: 0.23 K/UL (ref 0–0.3)
ABSOLUTE LYMPH #: 0.69 K/UL (ref 1.1–3.7)
ABSOLUTE MONO #: 0.69 K/UL (ref 0.1–1.2)
ANION GAP SERPL CALCULATED.3IONS-SCNC: 10 MMOL/L (ref 9–17)
BASOPHILS # BLD: 0 % (ref 0–2)
BASOPHILS ABSOLUTE: 0 K/UL (ref 0–0.2)
BUN BLDV-MCNC: 18 MG/DL (ref 8–23)
BUN/CREAT BLD: 28 (ref 9–20)
CALCIUM SERPL-MCNC: 9.2 MG/DL (ref 8.6–10.4)
CHLORIDE BLD-SCNC: 104 MMOL/L (ref 98–107)
CO2: 27 MMOL/L (ref 20–31)
CREAT SERPL-MCNC: 0.65 MG/DL (ref 0.5–0.9)
DIFFERENTIAL TYPE: ABNORMAL
EOSINOPHILS RELATIVE PERCENT: 0 % (ref 1–4)
GFR AFRICAN AMERICAN: >60 ML/MIN
GFR NON-AFRICAN AMERICAN: >60 ML/MIN
GFR SERPL CREATININE-BSD FRML MDRD: ABNORMAL ML/MIN/{1.73_M2}
GFR SERPL CREATININE-BSD FRML MDRD: ABNORMAL ML/MIN/{1.73_M2}
GLUCOSE BLD-MCNC: 200 MG/DL (ref 70–99)
HCT VFR BLD CALC: 39.4 % (ref 36.3–47.1)
HEMOGLOBIN: 12.5 G/DL (ref 11.9–15.1)
IMMATURE GRANULOCYTES: 1 %
LYMPHOCYTES # BLD: 3 % (ref 24–43)
MCH RBC QN AUTO: 33.3 PG (ref 25.2–33.5)
MCHC RBC AUTO-ENTMCNC: 31.7 G/DL (ref 28.4–34.8)
MCV RBC AUTO: 105.1 FL (ref 82.6–102.9)
MONOCYTES # BLD: 3 % (ref 3–12)
NRBC AUTOMATED: 0 PER 100 WBC
PDW BLD-RTO: 14 % (ref 11.8–14.4)
PLATELET # BLD: 398 K/UL (ref 138–453)
PLATELET ESTIMATE: ABNORMAL
PMV BLD AUTO: 9.9 FL (ref 8.1–13.5)
POTASSIUM SERPL-SCNC: 4.1 MMOL/L (ref 3.7–5.3)
RBC # BLD: 3.75 M/UL (ref 3.95–5.11)
RBC # BLD: ABNORMAL 10*6/UL
SEG NEUTROPHILS: 93 % (ref 36–65)
SEGMENTED NEUTROPHILS ABSOLUTE COUNT: 21.49 K/UL (ref 1.5–8.1)
SODIUM BLD-SCNC: 141 MMOL/L (ref 135–144)
WBC # BLD: 23.1 K/UL (ref 3.5–11.3)
WBC # BLD: ABNORMAL 10*3/UL

## 2019-05-31 PROCEDURE — 85025 COMPLETE CBC W/AUTO DIFF WBC: CPT

## 2019-05-31 PROCEDURE — 2060000000 HC ICU INTERMEDIATE R&B

## 2019-05-31 PROCEDURE — 2700000000 HC OXYGEN THERAPY PER DAY

## 2019-05-31 PROCEDURE — 6370000000 HC RX 637 (ALT 250 FOR IP): Performed by: FAMILY MEDICINE

## 2019-05-31 PROCEDURE — 6370000000 HC RX 637 (ALT 250 FOR IP): Performed by: INTERNAL MEDICINE

## 2019-05-31 PROCEDURE — 2580000003 HC RX 258: Performed by: FAMILY MEDICINE

## 2019-05-31 PROCEDURE — 6360000002 HC RX W HCPCS: Performed by: INTERNAL MEDICINE

## 2019-05-31 PROCEDURE — 94640 AIRWAY INHALATION TREATMENT: CPT

## 2019-05-31 PROCEDURE — 36415 COLL VENOUS BLD VENIPUNCTURE: CPT

## 2019-05-31 PROCEDURE — 94760 N-INVAS EAR/PLS OXIMETRY 1: CPT

## 2019-05-31 PROCEDURE — 6360000002 HC RX W HCPCS: Performed by: FAMILY MEDICINE

## 2019-05-31 PROCEDURE — 80048 BASIC METABOLIC PNL TOTAL CA: CPT

## 2019-05-31 PROCEDURE — G0009 ADMIN PNEUMOCOCCAL VACCINE: HCPCS | Performed by: FAMILY MEDICINE

## 2019-05-31 PROCEDURE — 90670 PCV13 VACCINE IM: CPT | Performed by: FAMILY MEDICINE

## 2019-05-31 RX ORDER — LOPERAMIDE HYDROCHLORIDE 2 MG/1
2 CAPSULE ORAL 4 TIMES DAILY PRN
Status: DISCONTINUED | OUTPATIENT
Start: 2019-05-31 | End: 2019-06-01 | Stop reason: HOSPADM

## 2019-05-31 RX ORDER — PREDNISONE 20 MG/1
20 TABLET ORAL 2 TIMES DAILY
Status: DISCONTINUED | OUTPATIENT
Start: 2019-05-31 | End: 2019-06-01 | Stop reason: HOSPADM

## 2019-05-31 RX ORDER — LEVOFLOXACIN 5 MG/ML
500 INJECTION, SOLUTION INTRAVENOUS EVERY 24 HOURS
Status: DISCONTINUED | OUTPATIENT
Start: 2019-06-01 | End: 2019-06-01 | Stop reason: HOSPADM

## 2019-05-31 RX ADMIN — OXYCODONE AND ACETAMINOPHEN 1 TABLET: 5; 325 TABLET ORAL at 13:45

## 2019-05-31 RX ADMIN — METOPROLOL SUCCINATE 25 MG: 25 TABLET, FILM COATED, EXTENDED RELEASE ORAL at 08:31

## 2019-05-31 RX ADMIN — DOXYCYCLINE 100 MG: 100 CAPSULE ORAL at 08:28

## 2019-05-31 RX ADMIN — FUROSEMIDE 40 MG: 40 TABLET ORAL at 08:28

## 2019-05-31 RX ADMIN — PNEUMOCOCCAL 13-VALENT CONJUGATE VACCINE 0.5 ML: 2.2; 2.2; 2.2; 2.2; 2.2; 4.4; 2.2; 2.2; 2.2; 2.2; 2.2; 2.2; 2.2 INJECTION, SUSPENSION INTRAMUSCULAR at 11:39

## 2019-05-31 RX ADMIN — IPRATROPIUM BROMIDE AND ALBUTEROL SULFATE 1 AMPULE: .5; 3 SOLUTION RESPIRATORY (INHALATION) at 07:49

## 2019-05-31 RX ADMIN — BUSPIRONE HYDROCHLORIDE 7.5 MG: 5 TABLET ORAL at 20:09

## 2019-05-31 RX ADMIN — MOMETASONE FUROATE AND FORMOTEROL FUMARATE DIHYDRATE 2 PUFF: 200; 5 AEROSOL RESPIRATORY (INHALATION) at 07:49

## 2019-05-31 RX ADMIN — ENOXAPARIN SODIUM 40 MG: 40 INJECTION SUBCUTANEOUS at 08:27

## 2019-05-31 RX ADMIN — Medication 10 ML: at 08:32

## 2019-05-31 RX ADMIN — PREDNISONE 20 MG: 20 TABLET ORAL at 20:11

## 2019-05-31 RX ADMIN — MONTELUKAST SODIUM 10 MG: 10 TABLET, FILM COATED ORAL at 20:11

## 2019-05-31 RX ADMIN — MAGNESIUM HYDROXIDE 30 ML: 400 SUSPENSION ORAL at 13:14

## 2019-05-31 RX ADMIN — OXYCODONE AND ACETAMINOPHEN 1 TABLET: 5; 325 TABLET ORAL at 20:11

## 2019-05-31 RX ADMIN — IPRATROPIUM BROMIDE AND ALBUTEROL SULFATE 1 AMPULE: .5; 3 SOLUTION RESPIRATORY (INHALATION) at 14:56

## 2019-05-31 RX ADMIN — METHYLPREDNISOLONE SODIUM SUCCINATE 40 MG: 40 INJECTION, POWDER, FOR SOLUTION INTRAMUSCULAR; INTRAVENOUS at 11:39

## 2019-05-31 RX ADMIN — IPRATROPIUM BROMIDE AND ALBUTEROL SULFATE 1 AMPULE: .5; 3 SOLUTION RESPIRATORY (INHALATION) at 19:16

## 2019-05-31 RX ADMIN — OXYCODONE AND ACETAMINOPHEN 1 TABLET: 5; 325 TABLET ORAL at 04:27

## 2019-05-31 RX ADMIN — METHYLPREDNISOLONE SODIUM SUCCINATE 40 MG: 40 INJECTION, POWDER, FOR SOLUTION INTRAMUSCULAR; INTRAVENOUS at 03:56

## 2019-05-31 RX ADMIN — BUSPIRONE HYDROCHLORIDE 7.5 MG: 5 TABLET ORAL at 08:28

## 2019-05-31 RX ADMIN — GABAPENTIN 600 MG: 300 CAPSULE ORAL at 08:28

## 2019-05-31 RX ADMIN — MOMETASONE FUROATE AND FORMOTEROL FUMARATE DIHYDRATE 2 PUFF: 200; 5 AEROSOL RESPIRATORY (INHALATION) at 19:16

## 2019-05-31 RX ADMIN — PANTOPRAZOLE SODIUM 40 MG: 40 TABLET, DELAYED RELEASE ORAL at 05:51

## 2019-05-31 RX ADMIN — GABAPENTIN 600 MG: 300 CAPSULE ORAL at 20:10

## 2019-05-31 RX ADMIN — Medication 10 ML: at 20:11

## 2019-05-31 RX ADMIN — POTASSIUM CHLORIDE 20 MEQ: 20 TABLET, EXTENDED RELEASE ORAL at 08:28

## 2019-05-31 RX ADMIN — FLUTICASONE PROPIONATE 1 SPRAY: 50 SPRAY, METERED NASAL at 08:26

## 2019-05-31 RX ADMIN — ISOSORBIDE MONONITRATE 30 MG: 30 TABLET ORAL at 08:28

## 2019-05-31 RX ADMIN — IPRATROPIUM BROMIDE AND ALBUTEROL SULFATE 1 AMPULE: .5; 3 SOLUTION RESPIRATORY (INHALATION) at 11:08

## 2019-05-31 RX ADMIN — DOXYCYCLINE 100 MG: 100 CAPSULE ORAL at 20:10

## 2019-05-31 ASSESSMENT — PAIN DESCRIPTION - PAIN TYPE
TYPE: CHRONIC PAIN

## 2019-05-31 ASSESSMENT — PAIN DESCRIPTION - ONSET
ONSET: ON-GOING

## 2019-05-31 ASSESSMENT — PAIN - FUNCTIONAL ASSESSMENT
PAIN_FUNCTIONAL_ASSESSMENT: ACTIVITIES ARE NOT PREVENTED

## 2019-05-31 ASSESSMENT — PAIN DESCRIPTION - FREQUENCY
FREQUENCY: INTERMITTENT

## 2019-05-31 ASSESSMENT — ENCOUNTER SYMPTOMS
COUGH: 1
DIARRHEA: 1
NAUSEA: 1
SHORTNESS OF BREATH: 0

## 2019-05-31 ASSESSMENT — PAIN SCALES - GENERAL
PAINLEVEL_OUTOF10: 3
PAINLEVEL_OUTOF10: 7
PAINLEVEL_OUTOF10: 4
PAINLEVEL_OUTOF10: 4
PAINLEVEL_OUTOF10: 3
PAINLEVEL_OUTOF10: 0
PAINLEVEL_OUTOF10: 7
PAINLEVEL_OUTOF10: 3
PAINLEVEL_OUTOF10: 0

## 2019-05-31 ASSESSMENT — PAIN DESCRIPTION - ORIENTATION
ORIENTATION: MID;LOWER
ORIENTATION: LOWER

## 2019-05-31 ASSESSMENT — PAIN DESCRIPTION - LOCATION
LOCATION: BACK
LOCATION: BACK;NECK

## 2019-05-31 ASSESSMENT — PAIN DESCRIPTION - DESCRIPTORS
DESCRIPTORS: DISCOMFORT

## 2019-05-31 ASSESSMENT — PAIN DESCRIPTION - PROGRESSION
CLINICAL_PROGRESSION: GRADUALLY WORSENING
CLINICAL_PROGRESSION: GRADUALLY IMPROVING

## 2019-05-31 NOTE — PROGRESS NOTES
Antoinette Andino is a 80 y.o. female patient.     Current Facility-Administered Medications   Medication Dose Route Frequency Provider Last Rate Last Dose    predniSONE (DELTASONE) tablet 20 mg  20 mg Oral BID Shannan Brambila MD       Roni Pastor [START ON 6/1/2019] levofloxacin (LEVAQUIN) 500 MG/100ML infusion 500 mg  500 mg Intravenous Q24H Bakari Mckinney MD        potassium chloride (KLOR-CON M) extended release tablet 20 mEq  20 mEq Oral Daily with breakfast Teofilo Valentine MD   20 mEq at 05/31/19 0828    nicotine (NICODERM CQ) 21 MG/24HR 1 patch  1 patch Transdermal Daily Teofilo Valentine MD   1 patch at 05/31/19 0825    nicotine polacrilex (NICORETTE) gum 2 mg  2 mg Oral PRN Teofilo Valentine MD        oxyCODONE-acetaminophen (PERCOCET) 5-325 MG per tablet 1 tablet  1 tablet Oral Q6H PRN Teofilo Valentine MD   1 tablet at 05/31/19 1345    mometasone-formoterol (DULERA) 200-5 MCG/ACT inhaler 2 puff  2 puff Inhalation BID Shannan Brambila MD   2 puff at 05/31/19 0749    ipratropium-albuterol (DUONEB) nebulizer solution 1 ampule  1 ampule Inhalation Q4H PRN Bakari Mckinney MD        busPIRone (BUSPAR) tablet 7.5 mg  7.5 mg Oral BID Teofilo Valentine MD   7.5 mg at 05/31/19 0828    fluticasone (FLONASE) 50 MCG/ACT nasal spray 1 spray  1 spray Each Nostril Daily Teofilo Valentine MD   1 spray at 05/31/19 0826    furosemide (LASIX) tablet 40 mg  40 mg Oral Daily Bakari Mckinney MD   40 mg at 05/31/19 0828    gabapentin (NEURONTIN) capsule 600 mg  600 mg Oral BID Teofilo Valentine MD   600 mg at 05/31/19 0828    isosorbide mononitrate (IMDUR) extended release tablet 30 mg  30 mg Oral Daily Bakari Mckinney MD   30 mg at 05/31/19 7551    metoprolol succinate (TOPROL XL) extended release tablet 25 mg  25 mg Oral Daily Bakari Mckinney MD   25 mg at 05/31/19 0831    montelukast (SINGULAIR) tablet 10 mg  10 mg Oral Nightly Teofilo Valentine MD   10 mg at 05/30/19 2127    pantoprazole (PROTONIX) tablet 40 mg  40 mg Oral [facility] Baakri Mckinney MD   40 mg at 05/31/19 0551    sodium chloride flush 0.9 % injection 10 mL  10 mL Intravenous 2 times per day Bakari Mckinney MD   10 mL at 05/31/19 9199    sodium chloride flush 0.9 % injection 10 mL  10 mL Intravenous PRN Bakari Mckinney MD        magnesium hydroxide (MILK OF MAGNESIA) 400 MG/5ML suspension 30 mL  30 mL Oral Daily PRN Bakari Mckinney MD   30 mL at 05/31/19 1314    nicotine (NICODERM CQ) 21 MG/24HR 1 patch  1 patch Transdermal Daily PRN Bakari Mckinney MD        enoxaparin (LOVENOX) injection 40 mg  40 mg Subcutaneous Daily Bakari Mckinney MD   40 mg at 05/31/19 0827    ipratropium-albuterol (DUONEB) nebulizer solution 1 ampule  1 ampule Inhalation Q4H [place] Bakari Mckinney MD   1 ampule at 05/31/19 1456    acetaminophen (TYLENOL) tablet 650 mg  650 mg Oral Q4H PRN Bakari Mckinney MD        doxycycline monohydrate (MONODOX) capsule 100 mg  100 mg Oral BID Nayeli Jimenez MD   100 mg at 05/31/19 0828    ondansetron (ZOFRAN-ODT) disintegrating tablet 4 mg  4 mg Oral Q6H PRN Bakari Mckinney MD        Or    ondansetron (ZOFRAN) injection 4 mg  4 mg Intravenous Q6H PRN Bakari Mckinney MD         Allergies   Allergen Reactions    Sulfa Antibiotics Hives and Itching     Principal Problem:    Acute exacerbation of chronic obstructive pulmonary disease (COPD) ([facility] [address]  Active Problems:    Coronary artery disease involving native coronary artery of native heart without angina pectoris    Benign essential HTN    Chronic bilateral low back pain without sciatica    Generalized anxiety disorder  Resolved Problems:    * No resolved hospital problems. *    Blood pressure 107/61, pulse 71, temperature 98.7 °F (37.1 °C), temperature source Temporal, resp. rate 16, height 5' 3\" (1.6 m), weight 173 lb 3.2 oz (78.6 kg), SpO2 90 %. Subjective:  Symptoms:  Improved. She reports cough, weakness, diarrhea and anxiety. No shortness of breath or malaise. Diet:  Poor intake. She reports  nausea.     Activity level: Impaired due to weakness. Pain:  She complains of pain that is mild. She reports pain is improving. Pain is well controlled. Objective:  General Appearance:  Comfortable, ill-appearing, in no acute distress and not in pain. Vital signs: (most recent): Blood pressure 107/61, pulse 71, temperature 98.7 °F (37.1 °C), temperature source Temporal, resp. rate 16, height 5' 3\" (1.6 m), weight 173 lb 3.2 oz (78.6 kg), SpO2 90 %. Output: Producing urine and producing stool. HEENT: Normal HEENT exam.    Lungs:  Normal respiratory rate and increased effort. Breath sounds clear to auscultation. She is in respiratory distress. No stridor. There are rales and decreased breath sounds. Heart: Normal rate. Regular rhythm. S1 normal and S2 normal.    Chest: No chest wall tenderness. Abdomen: Abdomen is soft and non-distended. Bowel sounds are normal.   There is no abdominal tenderness. There is no splenomegaly. There is no hepatomegaly. Extremities: There is no local swelling. Neurological: Patient is alert and oriented to person, place and time. Skin:  Warm and dry. Assessment:    Condition: In serious condition. Improving. (Patient Active Problem List:     Cellulitis and abscess of face     Acute exacerbation of chronic obstructive pulmonary disease (COPD) ([address]     Coronary artery disease involving native coronary artery of native heart without angina pectoris     Benign essential HTN     Chronic bilateral low back pain without sciatica     Generalized anxiety disorder    ). Plan:   (Pt fees breathing is better  C/o diarrhea  4 BMs  Stays was incontnent  Check C diff  DC Rocephin  Levaquin  Hydrate   Anxiety  Counseled  meds prn  Supportive therapy  CAD stbale  HTN stable  More than 30 min spent o counseling, eval, mgmt).        Jeaneth Thompson MD  5/31/2019

## 2019-05-31 NOTE — FLOWSHEET NOTE
Patient sleeping; no family present. Writer prays for patient; leaves note of support on tray table. Spiritual Care will follow as needed.      05/31/19 1145   Encounter Summary   Services provided to: Patient   Referral/Consult From: Mamie   Continue Visiting   (5/31/19 sleeping)   Complexity of Encounter Low   Length of Encounter 15 minutes   Routine   Type Follow up

## 2019-05-31 NOTE — PROGRESS NOTES
Physical Therapy  DATE: 2019    NAME: Trini Adams  MRN: [id number]   : 1950    Patient not seen this date for Physical Therapy due to:  [] Blood transfusion in progress  [] Cancel by RN  [] Hemodialysis  []  Refusal by Patient   [] Spine Precautions   [] Strict Bedrest  [] Surgery  [] Testing      [x] Cx , pt out of room when checked for PT session       [] PT being discontinued at this time. Patient independent. No further needs. [] PT being discontinued at this time as the patient has been transferred to hospice care. No further needs.     [address], PT

## 2019-05-31 NOTE — FLOWSHEET NOTE
Dr. Marysol Barraza arrived to the patient's bedside for evaluation and was updated on the patient's current status via RN. New orders received to discontinue sanctura. Awaiting Dr. Marysol Barraza to enter additional orders at this time. Plan for possible discharge tomorrow. Will continue to monitor closely.

## 2019-05-31 NOTE — PROGRESS NOTES
Aaliyah Peter is a 80 y.o. female patient.     Current Facility-Administered Medications   Medication Dose Route Frequency Provider Last Rate Last Dose    methylPREDNISolone sodium (SOLU-MEDROL) injection 40 mg  40 mg Intravenous Q8H Robby Benites MD   40 mg at 05/30/19 1400    potassium chloride (KLOR-CON M) extended release tablet 20 mEq  20 mEq Oral Daily with breakfast Bakari Mckinney MD   20 mEq at 05/30/19 0755    nicotine (NICODERM CQ) 21 MG/24HR 1 patch  1 patch Transdermal Daily Chilo Zapata MD   1 patch at 05/30/19 1042    nicotine polacrilex (NICORETTE) gum 2 mg  2 mg Oral PRN Bakari Mckinney MD        oxyCODONE-acetaminophen (PERCOCET) 5-325 MG per tablet 1 tablet  1 tablet Oral Q6H PRN Bakari Mckinney MD   1 tablet at 05/30/19 1734    pneumococcal 13-valent conjugate (PREVNAR) injection 0.5 mL  0.5 mL Intramuscular Once Chilo Zapata MD        mometasone-formoterol (DULERA) 200-5 MCG/ACT inhaler 2 puff  2 puff Inhalation BID Robby Benites MD   2 puff at 05/30/19 1901    ipratropium-albuterol (DUONEB) nebulizer solution 1 ampule  1 ampule Inhalation Q4H PRN Bakari Mckinney MD        busPIRone (BUSPAR) tablet 7.5 mg  7.5 mg Oral BID Bakari Mckinney MD   7.5 mg at 05/30/19 0756    fluticasone (FLONASE) 50 MCG/ACT nasal spray 1 spray  1 spray Each Nostril Daily Bakari Mckinney MD   1 spray at 05/30/19 0803    furosemide (LASIX) tablet 40 mg  40 mg Oral Daily Bakari Mckinney MD   40 mg at 05/30/19 0755    gabapentin (NEURONTIN) capsule 600 mg  600 mg Oral BID Bakari Mckinney MD   600 mg at 05/30/19 0755    isosorbide mononitrate (IMDUR) extended release tablet 30 mg  30 mg Oral Daily Bakari Mckinney MD   30 mg at 05/30/19 0756    metoprolol succinate (TOPROL XL) extended release tablet 25 mg  25 mg Oral Daily Bakari Mckinney MD   25 mg at 05/30/19 0756    montelukast (SINGULAIR) tablet 10 mg  10 mg Oral Nightly Bakari Mckinney MD   10 mg at 05/29/19 2000    pantoprazole (PROTONIX) tablet 40 mg  40 mg Oral QAM AC Bakari Mckinney MD   40 mg at 05/30/19 0614    trospium (SANCTURA) tablet 20 mg  20 mg Oral BID AC Bakari Mckinney MD   Stopped at 05/30/19 0700    sodium chloride flush 0.9 % injection 10 mL  10 mL Intravenous 2 times per day Julita Samuel MD   10 mL at 05/30/19 0759    sodium chloride flush 0.9 % injection 10 mL  10 mL Intravenous PRN Bakari Mckinney MD        magnesium hydroxide (MILK OF MAGNESIA) 400 MG/5ML suspension 30 mL  30 mL Oral Daily PRN Bakari Mckinney MD        nicotine (NICODERM CQ) 21 MG/24HR 1 patch  1 patch Transdermal Daily PRN Bakari Mckinney MD        enoxaparin (LOVENOX) injection 40 mg  40 mg Subcutaneous Daily Bakari Mckinney MD   40 mg at 05/30/19 0756    ipratropium-albuterol (DUONEB) nebulizer solution 1 ampule  1 ampule Inhalation Q4H WA Bakari Mckinney MD   1 ampule at 05/30/19 1901    acetaminophen (TYLENOL) tablet 650 mg  650 mg Oral Q4H PRN Bakari Mckinney MD        cefTRIAXone (ROCEPHIN) 1 g IVPB in 50 mL D5W minibag  1 g Intravenous Q24H Julita Samuel MD   Stopped at 05/29/19 2353    doxycycline monohydrate (MONODOX) capsule 100 mg  100 mg Oral BID Bakari Mckinney MD   100 mg at 05/30/19 0756    ondansetron (ZOFRAN-ODT) disintegrating tablet 4 mg  4 mg Oral Q6H PRN Bakari Mckinney MD        Or    ondansetron (ZOFRAN) injection 4 mg  4 mg Intravenous Q6H PRN Bakari Mckinney MD         Allergies   Allergen Reactions    Sulfa Antibiotics Hives and Itching     Principal Problem:    Acute exacerbation of chronic obstructive pulmonary disease (COPD) ([address]  Resolved Problems:    * No resolved hospital problems. *    Blood pressure (!) 114/53, pulse 69, temperature 97.9 °F (36.6 °C), temperature source Oral, resp. rate 16, height 5' 3\" (1.6 m), weight 165 lb (74.8 kg), SpO2 95 %. Subjective:  Symptoms:  Improved. She reports shortness of breath, malaise, cough, weakness and anxiety. No diarrhea. Diet:  Poor intake. Activity level: Impaired due to weakness. Pain:  She complains of pain that is mild. She reports pain is improving. Pain is well controlled. Objective:  General Appearance:  Comfortable, ill-appearing, in no acute distress and not in pain. Vital signs: (most recent): Blood pressure (!) 114/53, pulse 69, temperature 97.9 °F (36.6 °C), temperature source Oral, resp. rate 16, height 5' 3\" (1.6 m), weight 165 lb (74.8 kg), SpO2 95 %. Output: Producing urine and producing stool. HEENT: Normal HEENT exam.    Lungs:  Normal respiratory rate and increased effort. Breath sounds clear to auscultation. She is in respiratory distress. No stridor. There are rales and decreased breath sounds. Heart: Normal rate. Regular rhythm. S1 normal and S2 normal.    Chest: No chest wall tenderness. Abdomen: Abdomen is soft and non-distended. Bowel sounds are normal.   There is no abdominal tenderness. There is no splenomegaly. There is no hepatomegaly. Extremities: There is no local swelling. Neurological: Patient is alert and oriented to person, place and time. Skin:  Warm and dry. XR CHEST PORTABLE [[id number]] Collected: 05/30/19 0603   Updated: 05/30/19 0701    Narrative:     EXAMINATION:  ONE XRAY VIEW OF THE CHEST    5/30/2019 5:09 am    COMPARISON:  May 29, 2019. HISTORY:  ORDERING SYSTEM PROVIDED HISTORY: infiltrate  TECHNOLOGIST PROVIDED HISTORY:  infiltrate  Ordering Physician Provided Reason for Exam: infiltrate f/u    FINDINGS:  Frontal portable view of the chest.  Hyperexpanded lung volume.  Diffusely  prominent interstitium.  Persistent bilateral basilar predominant  heterogeneous opacities, slightly progressed on the right.  No pleural  effusion or pneumothorax.  Stable cardiomegaly.  Atherosclerotic thoracic  aorta.  Stable regional skeleton.    Impression:     Persistent bilateral basilar predominant heterogeneous opacities, slightly  progressed on the right.  Differential considerations include atelectasis,  multifocal pneumonia and asymmetric pulmonary edema. Hyperexpanded lung volume can be seen in the setting of COPD. Stable cardiomegaly. CBC Auto Differential [[id number]] (Abnormal) Collected: 05/30/19 0447   Updated: 05/30/19 0655    Specimen Source: Blood     WBC 27. 3High  k/uL    RBC 3. 92Low  m/uL    Hemoglobin 13.1 g/dL    Hematocrit 41.1 %    . 8High  fL    MCH 33.4 pg    MCHC 31.9 g/dL    RDW 14.3 %    Platelets 186 k/uL    MPV 9.6 fL    NRBC Automated 0.0 per 100 WBC    Differential Type NOT REPORTED    WBC Morphology NOT REPORTED    RBC Morphology NOT REPORTED    Platelet Estimate NOT REPORTED    Seg Neutrophils 93High  %    Lymphocytes 4Low  %    Monocytes 2 %    Eosinophils % 0Low  %    Basophils 0 %    Immature Granulocytes 1High  %    Segs Absolute 25. 39High  k/uL    Absolute Lymph # 1.09 k/uL    Absolute Mono # 0.55 k/uL    Absolute Eos # 0.00 k/uL    Basophils # 0.00 k/uL    Absolute Immature Granulocyte 0.27 k/uL   Basic Metabolic Panel w/ Reflex to MG [[id number]] (Abnormal) Collected: 05/30/19 0447   Updated: 05/30/19 0522     Glucose 161High  mg/dL    BUN 15 mg/dL    CREATININE 0.56 mg/dL    Bun/Cre Ratio 27High     Calcium 9.7 mg/dL    Sodium 136 mmol/L    Potassium 4.7 mmol/L    Chloride 100 mmol/L    CO2 27 mmol/L    Anion Gap 9 mmol/L    GFR Non-African American >60 mL/min    GFR African American >60 mL/min       Assessment:    Condition: In serious condition. Improving. (Patient Active Problem List:     Cellulitis and abscess of face     Acute exacerbation of chronic obstructive pulmonary disease (COPD) ([address]     Coronary artery disease involving native coronary artery of native heart without angina pectoris     Benign essential HTN     Chronic bilateral low back pain without sciatica     Generalized anxiety disorder        ).      Plan:   (Pt feels better  Still congested  increased WOB  HTN stable  Toby dep  Nicoderm  LBP  Prn pain meds  Anxiety  Better  DC plan once ok w Pulm).        Nayeli Jimenez MD  5/30/2019

## 2019-05-31 NOTE — PROGRESS NOTES
Pulmonary Critical Care Progress Note  Ham Hernandez MD     Patient seen for the follow up of acute on chronic hypoxic respiratory failure, tracheobronchitis, active smoking, Acute exacerbation of chronic obstructive pulmonary disease (COPD) ([address]     Subjective:  She denies chest pain. Occasional cough with creamy white sputum production. Her shortness of breath is getting better. She is 100% on room air. No significant overnight events. She is tolerating orals. Examination:  Vitals: BP (!) 119/57   Pulse 75   Temp 98 °F (36.7 °C) (Temporal)   Resp 16   Ht 5' 3\" (1.6 m)   Wt 173 lb 3.2 oz (78.6 kg)   SpO2 91%   BMI 30.68 kg/m²   General appearance: alert and cooperative with exam, resting comfortably in bed  Neck: No JVD  Lungs: Moderate air exchange, no wheezing  Heart: regular rate and rhythm, S1, S2 normal, no gallop  Abdomen: Soft, non tender, + BS  Extremities: no cyanosis or clubbing.  No significant edema    LABs:  CBC:   Recent Labs     05/30/19  0447 05/31/19  0417   WBC 27.3* 23.1*   HGB 13.1 12.5   HCT 41.1 39.4    398     BMP:   Recent Labs     05/30/19  0447 05/31/19  0417    141   K 4.7 4.1   CO2 27 27   BUN 15 18   CREATININE 0.56 0.65   LABGLOM >60 >60   GLUCOSE 161* 200*     Radiology:  5/30      Impression:  · Acute on chronic hypoxic respiratory failure  · Acute exacerbation of COPD  · Tracheo-bronchitis  · Active smoking,> 50-pack-year smoking    Recommendations:  · Discontinue IV Ceftriaxone and PO Doxycycline  · Discontinue IV solu-medrol  · Prednisone taper  · Albuterol and Ipratropium Q 4 hours and prn  · Dulera 200  · Singulair   · Nicotine patch   · BiPAP  · Monitor off of oxygen   · DVT prophylaxis with low molecular weight heparin  · Discharge planning  · Will follow with you    Ham Hernandez MD, [facility]  Pulmonary Critical Care and Sleep Medicine,  [facility]  Cell: [phone]  Office: [phone]

## 2019-05-31 NOTE — FLOWSHEET NOTE
Kary Moore NP arrived to the patient's bedside for evaluation and was updated on the patient's current status via RN. Patient on room air with SpO2 at 100%. Patient okay to receive pneumonia vaccine. Will continue to monitor closely at this time.

## 2019-05-31 NOTE — FLOWSHEET NOTE
Dr. Jerzy Prabhakar arrived to the patient's bedside for evaluation and was updated on the patient's current status via RN. Patient is now on room air SpO2 at %. New orders received to d/c solu-medrol and order received for prednisone (see order for details). Patient is okay for d/c per pulmonary. Will continue to monitor closely.

## 2019-06-01 VITALS
HEART RATE: 78 BPM | TEMPERATURE: 98.3 F | HEIGHT: 63 IN | DIASTOLIC BLOOD PRESSURE: 79 MMHG | OXYGEN SATURATION: 94 % | WEIGHT: 173.2 LBS | BODY MASS INDEX: 30.69 KG/M2 | SYSTOLIC BLOOD PRESSURE: 121 MMHG | RESPIRATION RATE: 14 BRPM

## 2019-06-01 PROBLEM — J96.00 ACUTE RESPIRATORY FAILURE (HCC): Status: ACTIVE | Noted: 2019-06-01

## 2019-06-01 LAB
ANION GAP SERPL CALCULATED.3IONS-SCNC: 10 MMOL/L (ref 9–17)
BUN BLDV-MCNC: 16 MG/DL (ref 8–23)
BUN/CREAT BLD: 24 (ref 9–20)
CALCIUM SERPL-MCNC: 9.4 MG/DL (ref 8.6–10.4)
CHLORIDE BLD-SCNC: 100 MMOL/L (ref 98–107)
CO2: 27 MMOL/L (ref 20–31)
CREAT SERPL-MCNC: 0.66 MG/DL (ref 0.5–0.9)
GFR AFRICAN AMERICAN: >60 ML/MIN
GFR NON-AFRICAN AMERICAN: >60 ML/MIN
GFR SERPL CREATININE-BSD FRML MDRD: ABNORMAL ML/MIN/{1.73_M2}
GFR SERPL CREATININE-BSD FRML MDRD: ABNORMAL ML/MIN/{1.73_M2}
GLUCOSE BLD-MCNC: 133 MG/DL (ref 70–99)
HCT VFR BLD CALC: 41.9 % (ref 36.3–47.1)
HEMOGLOBIN: 13.4 G/DL (ref 11.9–15.1)
MCH RBC QN AUTO: 33.5 PG (ref 25.2–33.5)
MCHC RBC AUTO-ENTMCNC: 32 G/DL (ref 28.4–34.8)
MCV RBC AUTO: 104.8 FL (ref 82.6–102.9)
NRBC AUTOMATED: 0 PER 100 WBC
PDW BLD-RTO: 13.7 % (ref 11.8–14.4)
PLATELET # BLD: 410 K/UL (ref 138–453)
PMV BLD AUTO: 9.9 FL (ref 8.1–13.5)
POTASSIUM SERPL-SCNC: 4.6 MMOL/L (ref 3.7–5.3)
RBC # BLD: 4 M/UL (ref 3.95–5.11)
SODIUM BLD-SCNC: 137 MMOL/L (ref 135–144)
WBC # BLD: 16.3 K/UL (ref 3.5–11.3)

## 2019-06-01 PROCEDURE — 94640 AIRWAY INHALATION TREATMENT: CPT

## 2019-06-01 PROCEDURE — 6360000002 HC RX W HCPCS: Performed by: FAMILY MEDICINE

## 2019-06-01 PROCEDURE — 6370000000 HC RX 637 (ALT 250 FOR IP): Performed by: FAMILY MEDICINE

## 2019-06-01 PROCEDURE — 80048 BASIC METABOLIC PNL TOTAL CA: CPT

## 2019-06-01 PROCEDURE — 97116 GAIT TRAINING THERAPY: CPT

## 2019-06-01 PROCEDURE — 6370000000 HC RX 637 (ALT 250 FOR IP): Performed by: INTERNAL MEDICINE

## 2019-06-01 PROCEDURE — 36415 COLL VENOUS BLD VENIPUNCTURE: CPT

## 2019-06-01 PROCEDURE — 85027 COMPLETE CBC AUTOMATED: CPT

## 2019-06-01 RX ORDER — PREDNISONE 10 MG/1
10 TABLET ORAL DAILY
Qty: 5 TABLET | Refills: 0 | Status: SHIPPED | OUTPATIENT
Start: 2019-06-01 | End: 2019-06-06

## 2019-06-01 RX ORDER — DOCUSATE SODIUM 100 MG/1
100 CAPSULE, LIQUID FILLED ORAL 2 TIMES DAILY PRN
Qty: 60 CAPSULE | Refills: 0 | Status: SHIPPED | OUTPATIENT
Start: 2019-06-01

## 2019-06-01 RX ORDER — VARENICLINE TARTRATE 0.5 MG/1
.5-1 TABLET, FILM COATED ORAL SEE ADMIN INSTRUCTIONS
Qty: 57 TABLET | Refills: 0 | Status: SHIPPED | OUTPATIENT
Start: 2019-06-01

## 2019-06-01 RX ORDER — ALBUTEROL SULFATE 2.5 MG/3ML
2.5 SOLUTION RESPIRATORY (INHALATION) EVERY 6 HOURS PRN
Qty: 120 EACH | Refills: 0 | Status: SHIPPED | OUTPATIENT
Start: 2019-06-01

## 2019-06-01 RX ORDER — LEVOFLOXACIN 500 MG/1
500 TABLET, FILM COATED ORAL DAILY
Qty: 7 TABLET | Refills: 0 | Status: SHIPPED | OUTPATIENT
Start: 2019-06-01 | End: 2019-06-08

## 2019-06-01 RX ORDER — PREDNISONE 1 MG/1
5 TABLET ORAL DAILY
Qty: 5 TABLET | Refills: 0 | Status: SHIPPED | OUTPATIENT
Start: 2019-06-01 | End: 2019-06-06

## 2019-06-01 RX ORDER — NICOTINE 21 MG/24HR
1 PATCH, TRANSDERMAL 24 HOURS TRANSDERMAL DAILY PRN
Qty: 30 PATCH | Refills: 0 | Status: ON HOLD | OUTPATIENT
Start: 2019-06-01 | End: 2021-06-24

## 2019-06-01 RX ORDER — PREDNISONE 20 MG/1
20 TABLET ORAL DAILY
Qty: 5 TABLET | Refills: 0 | Status: SHIPPED | OUTPATIENT
Start: 2019-06-01 | End: 2019-06-06

## 2019-06-01 RX ORDER — GABAPENTIN 600 MG/1
600 TABLET ORAL 3 TIMES DAILY
Qty: 90 TABLET | Refills: 0 | Status: SHIPPED | OUTPATIENT
Start: 2019-06-01 | End: 2021-06-24

## 2019-06-01 RX ADMIN — DOXYCYCLINE 100 MG: 100 CAPSULE ORAL at 09:30

## 2019-06-01 RX ADMIN — IPRATROPIUM BROMIDE AND ALBUTEROL SULFATE 1 AMPULE: .5; 3 SOLUTION RESPIRATORY (INHALATION) at 06:00

## 2019-06-01 RX ADMIN — OXYCODONE AND ACETAMINOPHEN 1 TABLET: 5; 325 TABLET ORAL at 09:43

## 2019-06-01 RX ADMIN — FUROSEMIDE 40 MG: 40 TABLET ORAL at 09:30

## 2019-06-01 RX ADMIN — BUSPIRONE HYDROCHLORIDE 7.5 MG: 5 TABLET ORAL at 11:02

## 2019-06-01 RX ADMIN — LEVOFLOXACIN 500 MG: 5 INJECTION, SOLUTION INTRAVENOUS at 00:47

## 2019-06-01 RX ADMIN — METOPROLOL SUCCINATE 25 MG: 25 TABLET, FILM COATED, EXTENDED RELEASE ORAL at 09:30

## 2019-06-01 RX ADMIN — PANTOPRAZOLE SODIUM 40 MG: 40 TABLET, DELAYED RELEASE ORAL at 09:43

## 2019-06-01 RX ADMIN — FLUTICASONE PROPIONATE 1 SPRAY: 50 SPRAY, METERED NASAL at 09:30

## 2019-06-01 RX ADMIN — OXYCODONE AND ACETAMINOPHEN 1 TABLET: 5; 325 TABLET ORAL at 02:10

## 2019-06-01 RX ADMIN — OXYCODONE AND ACETAMINOPHEN 1 TABLET: 5; 325 TABLET ORAL at 15:51

## 2019-06-01 RX ADMIN — IPRATROPIUM BROMIDE AND ALBUTEROL SULFATE 1 AMPULE: .5; 3 SOLUTION RESPIRATORY (INHALATION) at 11:14

## 2019-06-01 RX ADMIN — PREDNISONE 20 MG: 20 TABLET ORAL at 09:30

## 2019-06-01 RX ADMIN — ENOXAPARIN SODIUM 40 MG: 40 INJECTION SUBCUTANEOUS at 09:30

## 2019-06-01 RX ADMIN — MOMETASONE FUROATE AND FORMOTEROL FUMARATE DIHYDRATE 2 PUFF: 200; 5 AEROSOL RESPIRATORY (INHALATION) at 06:00

## 2019-06-01 RX ADMIN — IPRATROPIUM BROMIDE AND ALBUTEROL SULFATE 1 AMPULE: .5; 3 SOLUTION RESPIRATORY (INHALATION) at 15:11

## 2019-06-01 RX ADMIN — ISOSORBIDE MONONITRATE 30 MG: 30 TABLET ORAL at 09:30

## 2019-06-01 RX ADMIN — GABAPENTIN 600 MG: 300 CAPSULE ORAL at 09:30

## 2019-06-01 ASSESSMENT — PAIN DESCRIPTION - PAIN TYPE
TYPE: CHRONIC PAIN

## 2019-06-01 ASSESSMENT — PAIN DESCRIPTION - ONSET
ONSET: PROGRESSIVE
ONSET: ON-GOING
ONSET: GRADUAL

## 2019-06-01 ASSESSMENT — PAIN SCALES - GENERAL
PAINLEVEL_OUTOF10: 4
PAINLEVEL_OUTOF10: 4
PAINLEVEL_OUTOF10: 7
PAINLEVEL_OUTOF10: 5
PAINLEVEL_OUTOF10: 3

## 2019-06-01 ASSESSMENT — PAIN DESCRIPTION - FREQUENCY: FREQUENCY: INTERMITTENT

## 2019-06-01 ASSESSMENT — PAIN DESCRIPTION - ORIENTATION
ORIENTATION: MID;LOWER
ORIENTATION: LOWER

## 2019-06-01 ASSESSMENT — PAIN - FUNCTIONAL ASSESSMENT
PAIN_FUNCTIONAL_ASSESSMENT: ACTIVITIES ARE NOT PREVENTED

## 2019-06-01 ASSESSMENT — PAIN DESCRIPTION - PROGRESSION: CLINICAL_PROGRESSION: GRADUALLY IMPROVING

## 2019-06-01 ASSESSMENT — PAIN DESCRIPTION - DESCRIPTORS
DESCRIPTORS: SORE
DESCRIPTORS: SORE
DESCRIPTORS: DISCOMFORT

## 2019-06-01 ASSESSMENT — PAIN DESCRIPTION - LOCATION
LOCATION: BACK;NECK
LOCATION: BACK

## 2019-06-01 ASSESSMENT — ENCOUNTER SYMPTOMS
SHORTNESS OF BREATH: 0
NAUSEA: 0
DIARRHEA: 0
COUGH: 1

## 2019-06-01 NOTE — FLOWSHEET NOTE
Patient was unavailable. Patient was busy.  shared in silent prayer. Follow up as needed.      06/01/19 1124   Encounter Summary   Services provided to: Patient;Patient not available   Referral/Consult From: Mamie   Continue Visiting   (6-1-19 PT: busy)   Complexity of Encounter Low   Length of Encounter 15 minutes   Spiritual Assessment Completed Yes   Routine   Type Follow up   Assessment Unable to respond   Intervention Prayer   Outcome Did not respond

## 2019-06-01 NOTE — CARE COORDINATION
Discharge planning    Patient chart reviewed and appreciate SS assessment. Patient admitted with AE COPD     PULM acute respiratory failure , COPD and tracheobronchitis, continue ATB and will start prednisone taper. RN notes yesterday patient is 100% on RA     OT and PT recommending home health PT.     SS patient was concerned regarding cost of nebulizer. She only has listed Medicare A&B,no other secondary    Per medicare guidelines patient will have 80% coverage if goes thru [facility]. Will have to be aware of the medications at time of dc as she has no script coverage noted. Will need to discuss with patient later this am . Will need to look into chart to see if Pavel HOPSON has seen patient for rx assist. If so need to fill today before pharmacy closes.
has a script from [facility] for that but has not filled due to worry about costs. Will call [facility]/pharmacy counter to see if she can get a nebulizer for low enough cost.  Pt thinks she is on Medicare and medicaid.   Blane cifuentes           Electronically signed by DORYS Garsia, STACIE on 5/29/19 at 11:17 AM

## 2019-06-01 NOTE — FLOWSHEET NOTE
06/01/19 1710   Provider Notification   Reason for Communication Evaluate; Review case   Provider Name Dr Kem Ball   Provider Notification Physician   Method of Communication Face to face   Response See orders   Notification Time 1700   Dr Kem Ball in to see pt. He discussed plan for smoking cessation- explained to pt that she may not smoke while on the nicotine patch. Pt indicated understanding. Rx written and meds reconciled by Dr Kem Ball. Pt to follow up with PCP this coming week.

## 2019-06-01 NOTE — DISCHARGE SUMMARY
Patient ID: Alexandre Bueno    MRN: [id number]     Acct:  [de-identified]       Patient's PCP: Neva Cowden, MD    Admit Date: 5/28/2019     Discharge Date:   6/1/19    Admitting Physician: Freddie Coon MD    Discharge Physician: Freddie Coon MD     Discharge Diagnoses: AECOPD    Primary Problem  Acute exacerbation of chronic obstructive pulmonary disease (COPD) [facility])    Active Hospital Problems    Diagnosis Date Noted    Acute respiratory failure ([facility] [J96.00] 06/01/2019    Coronary artery disease involving native coronary artery of native heart without angina pectoris [I25.10] 05/30/2019    Benign essential HTN [I10] 05/30/2019    Chronic bilateral low back pain without sciatica [M54.5, G89.29] 05/30/2019    Generalized anxiety disorder [F41.1] 05/30/2019    Acute exacerbation of chronic obstructive pulmonary disease (COPD) ([facility] [J44.1] 05/28/2019     Past Medical History:   Diagnosis Date    Asthma     Osteoarthritis      The patient was seen and examined on day of discharge and this discharge summary is in conjunction with any daily progress note from day of discharge. Code Status:  Full Code    Hospital Course: Pt admitted w AECOPD. Started on IV Abx, steroids, aerosols prn. Pt was initially hypoxic and also needed BiPAP. Pt also c/o anxiety and has been smoking. Pt developed diarrhea after which Abx changed. Pt however says it developed after taking Mag citrate for constipation.  Pt has been on narcotics for pain control    Consults:  pulmonary/intensive care    Significant Diagnostic Studies: as above, and as follows: see emr    Treatments: as above    Disposition: home    Discharged Condition: Stable    Follow Up:  Neva Cowden, MD in one week    Discharge Medications:    Franky Barnes   Home Medication Instructions QSS:[id number]    Printed on:06/01/19 5368   Medication Information                      albuterol (PROVENTIL) (2.5 MG/3ML) 0.083% nebulizer solution  Take 3 mLs by nebulization every 6

## 2019-06-01 NOTE — PROGRESS NOTES
Patient discharged to home after verbal and written discharge instructions given. Meds reviewed. Pt instructed to call PCP to be seen this week and to call pulm office in order to be seen by Dr Raeann Mcintyre in 2 weeks. All rx sent with pt. Pt's granddaughter present at time of discharge teaching. Pt voices no complaints or concerns at time of discharge and pt had no s/sx acute distress at time of discharge.

## 2019-06-01 NOTE — PROGRESS NOTES
Pulmonary Critical Care Progress Note       Patient seen for the follow up of acute on chronic hypoxic respiratory failure, tracheobronchitis, active smoking, Acute exacerbation of chronic obstructive pulmonary disease (COPD) ([facility]     Subjective:  She denies chest pain. She has improved shortness of breath. She has been on room air. She tolerates oral intake. She is ambulating. She has occasional cough with mild sputum production. Examination:  Vitals: /61   Pulse 67   Temp 97.6 °F (36.4 °C) (Temporal)   Resp 16   Ht 5' 3\" (1.6 m)   Wt 173 lb 3.2 oz (78.6 kg)   SpO2 92%   BMI 30.68 kg/m²   General appearance: alert and cooperative with exam  Neck: No JVD  Lungs: Decreased breath sounds crackles or wheezing  Heart: regular rate and rhythm, S1, S2 normal, no gallop  Abdomen: Soft, non tender, + BS  Extremities: no cyanosis or clubbing.  No significant edema    LABs:  CBC:   Recent Labs     05/31/19  0417 06/01/19  0505   WBC 23.1* 16.3*   HGB 12.5 13.4   HCT 39.4 41.9    410     BMP:   Recent Labs     05/31/19  0417 06/01/19  0505    137   K 4.1 4.6   CO2 27 27   BUN 18 16   CREATININE 0.65 0.66   LABGLOM >60 >60   GLUCOSE 200* 133*     Radiology:  5/30 CXR  Persistent bilateral basilar predominant heterogeneous opacities, slightly   progressed on the right.  Differential considerations include atelectasis,   multifocal pneumonia and asymmetric pulmonary edema.       Hyperexpanded lung volume can be seen in the setting of COPD.       Stable cardiomegaly             Impression:    · Acute on chronic hypoxic respiratory failure  · Acute exacerbation of COPD  · Tracheo-bronchitis  · Active smoking,> 50-pack-year smoking    Recommendations:    · Discontinue O2  · Albuterol and Ipratropium Q 4 hours and prn  · Dulera 200  · Singulair   · off IV Ceftriaxone   · Discontinue PO Doxycycline  · Levaquin 500 IV daily ; switch to oral on discharge  · Prednisone taper to stop  · Nicotine patch · Smoking cessation advised  · Discussed with RN  · Discharge planning today    Yumiko Suggs MD, [facility]  Pulmonary Critical Care and Sleep Medicine,  [facility] [place]: [phone]

## 2019-06-01 NOTE — PROGRESS NOTES
Kemal Balderas is a 80 y.o. female patient.     Current Facility-Administered Medications   Medication Dose Route Frequency Provider Last Rate Last Dose    predniSONE (DELTASONE) tablet 20 mg  20 mg Oral BID Poonam Booker MD   20 mg at 06/01/19 0930    levofloxacin (LEVAQUIN) 500 MG/100ML infusion 500 mg  500 mg Intravenous Q24H Jen Bell MD   Stopped at 06/01/19 0200    loperamide (IMODIUM) capsule 2 mg  2 mg Oral 4x Daily PRN aBkari Mckinney MD        potassium chloride (KLOR-CON M) extended release tablet 20 mEq  20 mEq Oral Daily with breakfast Jen Bell MD   20 mEq at 05/31/19 0828    nicotine (NICODERM CQ) 21 MG/24HR 1 patch  1 patch Transdermal Daily Jen Bell MD   1 patch at 06/01/19 0935    nicotine polacrilex (NICORETTE) gum 2 mg  2 mg Oral PRN Jen Bell MD        oxyCODONE-acetaminophen (PERCOCET) 5-325 MG per tablet 1 tablet  1 tablet Oral Q6H PRN Jen Bell MD   1 tablet at 06/01/19 0943    mometasone-formoterol (DULERA) 200-5 MCG/ACT inhaler 2 puff  2 puff Inhalation BID Poonam Booker MD   2 puff at 06/01/19 0600    ipratropium-albuterol (DUONEB) nebulizer solution 1 ampule  1 ampule Inhalation Q4H PRN Bakari Mckinney MD        busPIRone (BUSPAR) tablet 7.5 mg  7.5 mg Oral BID Jen Bell MD   7.5 mg at 06/01/19 1102    fluticasone (FLONASE) 50 MCG/ACT nasal spray 1 spray  1 spray Each Nostril Daily Jen Bell MD   1 spray at 06/01/19 0930    furosemide (LASIX) tablet 40 mg  40 mg Oral Daily Bakari Mckinney MD   40 mg at 06/01/19 0930    gabapentin (NEURONTIN) capsule 600 mg  600 mg Oral BID Jen Bell MD   600 mg at 06/01/19 0930    isosorbide mononitrate (IMDUR) extended release tablet 30 mg  30 mg Oral Daily Bakari Mckinney MD   30 mg at 06/01/19 0930    metoprolol succinate (TOPROL XL) extended release tablet 25 mg  25 mg Oral Daily Bakari Mckinney MD   25 mg at 06/01/19 0930    montelukast (SINGULAIR) tablet 10 mg  10 mg Oral Nightly Jen Bell MD   10 mg at 05/31/19 2011    pantoprazole (PROTONIX) tablet 40 mg  40 mg Oral QAM AC Bakari Mckinney MD   40 mg at 06/01/19 0943    sodium chloride flush 0.9 % injection 10 mL  10 mL Intravenous 2 times per day Nayeli Jimenez MD   10 mL at 05/31/19 2011    sodium chloride flush 0.9 % injection 10 mL  10 mL Intravenous PRN Bakari Mckinney MD        magnesium hydroxide (MILK OF MAGNESIA) 400 MG/5ML suspension 30 mL  30 mL Oral Daily PRN Bakari Mckinney MD   30 mL at 05/31/19 1314    nicotine (NICODERM CQ) 21 MG/24HR 1 patch  1 patch Transdermal Daily PRN Bakari Mckinney MD        enoxaparin (LOVENOX) injection 40 mg  40 mg Subcutaneous Daily Bakari Mckinney MD   40 mg at 06/01/19 0930    ipratropium-albuterol (DUONEB) nebulizer solution 1 ampule  1 ampule Inhalation Q4H WA Bakari Mckinney MD   1 ampule at 06/01/19 1511    acetaminophen (TYLENOL) tablet 650 mg  650 mg Oral Q4H PRN Bakari Mckinney MD        ondansetron (ZOFRAN-ODT) disintegrating tablet 4 mg  4 mg Oral Q6H PRN Bakari Mckinney MD        Or    ondansetron (ZOFRAN) injection 4 mg  4 mg Intravenous Q6H PRN Bakari Mckinney MD         Allergies   Allergen Reactions    Sulfa Antibiotics Hives and Itching     Principal Problem:    Acute exacerbation of chronic obstructive pulmonary disease (COPD) ([facility]  Active Problems:    Coronary artery disease involving native coronary artery of native heart without angina pectoris    Benign essential HTN    Chronic bilateral low back pain without sciatica    Generalized anxiety disorder  Resolved Problems:    * No resolved hospital problems. *    Blood pressure 126/61, pulse 67, temperature 97.6 °F (36.4 °C), temperature source Temporal, resp. rate 16, height 5' 3\" (1.6 m), weight 173 lb 3.2 oz (78.6 kg), SpO2 92 %. Subjective:  Symptoms:  Improved. She reports cough. No shortness of breath, malaise, weakness, diarrhea or anxiety. Diet:  Adequate intake. No nausea.     Activity level: Returning to normal.    Pain:  She 16.3High  k/uL    RBC 4.00 m/uL    Hemoglobin 13.4 g/dL    Hematocrit 41.9 %    . 8High  fL    MCH 33.5 pg    MCHC 32.0 g/dL    RDW 13.7 %    Platelets 280 k/uL       Assessment:    Condition: In stable condition. Improving. (Patient Active Problem List:     Cellulitis and abscess of face     Acute exacerbation of chronic obstructive pulmonary disease (COPD) ([address]     Coronary artery disease involving native coronary artery of native heart without angina pectoris     Benign essential HTN     Chronic bilateral low back pain without sciatica     Generalized anxiety disorder    ). Plan:   (Pt doing really well now  Off O2  Ambulating  Eating well  AECOPD  Ac resp failure resolved  No Pneumonia  Trach bronchitis  Per Pulm will need steroid taper  Now Abx PO  Diarrhea resolved  counseld extensively on smoking cessation  Methods discussed  No smoking at all from here on! Pt verbalizes understanding  Asking for Neurontin refill  counsled on other exacerb factors  Must see Dr. Donal Cowden within a week  counseld on anxiety mgmt also  Also wishes to use [employer]).        Angelica Anderson MD  6/1/2019

## 2019-06-01 NOTE — PROGRESS NOTES
limitations: Decreased functional mobility ; Decreased strength;Decreased endurance;Decreased balance;Decreased safe awareness  Patient Education: Safe stair negotiation  Activity Tolerance  Activity Tolerance: Patient Tolerated treatment well        OutComes Score   AM-PAC Score  AM-PAC Inpatient Mobility Raw Score : 23  AM-PAC Inpatient T-Scale Score : 56.93  Mobility Inpatient CMS 0-100% Score: 11.2  Mobility Inpatient CMS G-Code Modifier : CI         Goals  Short term goals  Time Frame for Short term goals: 5 visits  Short term goal 1: Patient will be IND with bed mobility to prevent complications of immobility  Short term goal 2: Patient will be IND with transfers to promote safe access to bathroom  Short term goal 3: Patient will amb with appropriate device on level surfaces without LOB to promote safe negotiation of home environment  Short term goal 4: Patient will demo 'good' sitting and standing balance to promote increased safety with all standing activities  Patient Goals   Patient goals :  To walk better and further distances    Plan    Plan  Times per week: 1-2 times a day, 5-6 days a week  Current Treatment Recommendations: Strengthening, Balance Training, Functional Mobility Training, Transfer Training, Endurance Training, Gait Training, Stair training, Safety Education & Training, Home Exercise Program  Safety Devices  Type of devices: Call light within reach     Therapy Time   Individual Concurrent Group Co-treatment   Time In 1249         Time Out 1300         Minutes [id number] [place], [place]

## 2020-07-27 ENCOUNTER — HOSPITAL ENCOUNTER (OUTPATIENT)
Dept: PREADMISSION TESTING | Age: 70
Setting detail: SPECIMEN
Discharge: HOME | End: 2020-07-31
Payer: MEDICARE

## 2020-07-27 PROCEDURE — U0003 INFECTIOUS AGENT DETECTION BY NUCLEIC ACID (DNA OR RNA); SEVERE ACUTE RESPIRATORY SYNDROME CORONAVIRUS 2 (SARS-COV-2) (CORONAVIRUS DISEASE [COVID-19]), AMPLIFIED PROBE TECHNIQUE, MAKING USE OF HIGH THROUGHPUT TECHNOLOGIES AS DESCRIBED BY CMS-2020-01-R: HCPCS

## 2020-07-29 LAB — SARS-COV-2, NAA: NOT DETECTED

## 2020-07-30 ENCOUNTER — ANESTHESIA EVENT (OUTPATIENT)
Dept: OPERATING ROOM | Age: 70
End: 2020-07-30
Payer: MEDICARE

## 2020-07-30 ENCOUNTER — TELEPHONE (OUTPATIENT)
Dept: PRIMARY CARE CLINIC | Age: 70
End: 2020-07-30

## 2020-07-31 ENCOUNTER — HOSPITAL ENCOUNTER (OUTPATIENT)
Age: 70
Setting detail: OUTPATIENT SURGERY
Discharge: HOME HEALTH | End: 2020-07-31
Attending: INTERNAL MEDICINE | Admitting: INTERNAL MEDICINE
Payer: MEDICARE

## 2020-07-31 ENCOUNTER — ANESTHESIA (OUTPATIENT)
Dept: OPERATING ROOM | Age: 70
End: 2020-07-31
Payer: MEDICARE

## 2020-07-31 VITALS
RESPIRATION RATE: 18 BRPM | OXYGEN SATURATION: 92 % | DIASTOLIC BLOOD PRESSURE: 47 MMHG | HEART RATE: 62 BPM | SYSTOLIC BLOOD PRESSURE: 110 MMHG | HEIGHT: 63 IN | TEMPERATURE: 96.8 F | BODY MASS INDEX: 33.31 KG/M2 | WEIGHT: 188 LBS

## 2020-07-31 VITALS — SYSTOLIC BLOOD PRESSURE: 96 MMHG | DIASTOLIC BLOOD PRESSURE: 50 MMHG | OXYGEN SATURATION: 91 %

## 2020-07-31 PROCEDURE — 3700000000 HC ANESTHESIA ATTENDED CARE: Performed by: INTERNAL MEDICINE

## 2020-07-31 PROCEDURE — 2500000003 HC RX 250 WO HCPCS: Performed by: ANESTHESIOLOGY

## 2020-07-31 PROCEDURE — 87077 CULTURE AEROBIC IDENTIFY: CPT

## 2020-07-31 PROCEDURE — 2580000003 HC RX 258: Performed by: ANESTHESIOLOGY

## 2020-07-31 PROCEDURE — 7100000010 HC PHASE II RECOVERY - FIRST 15 MIN: Performed by: INTERNAL MEDICINE

## 2020-07-31 PROCEDURE — 6360000002 HC RX W HCPCS: Performed by: NURSE ANESTHETIST, CERTIFIED REGISTERED

## 2020-07-31 PROCEDURE — 7100000011 HC PHASE II RECOVERY - ADDTL 15 MIN: Performed by: INTERNAL MEDICINE

## 2020-07-31 PROCEDURE — 3700000001 HC ADD 15 MINUTES (ANESTHESIA): Performed by: INTERNAL MEDICINE

## 2020-07-31 PROCEDURE — 88305 TISSUE EXAM BY PATHOLOGIST: CPT

## 2020-07-31 PROCEDURE — 3609027000 HC COLONOSCOPY: Performed by: INTERNAL MEDICINE

## 2020-07-31 PROCEDURE — 3609012400 HC EGD TRANSORAL BIOPSY SINGLE/MULTIPLE: Performed by: INTERNAL MEDICINE

## 2020-07-31 PROCEDURE — 2709999900 HC NON-CHARGEABLE SUPPLY: Performed by: INTERNAL MEDICINE

## 2020-07-31 RX ORDER — SODIUM CHLORIDE 9 MG/ML
INJECTION, SOLUTION INTRAVENOUS CONTINUOUS
Status: DISCONTINUED | OUTPATIENT
Start: 2020-08-01 | End: 2020-07-31

## 2020-07-31 RX ORDER — SODIUM CHLORIDE 0.9 % (FLUSH) 0.9 %
10 SYRINGE (ML) INJECTION EVERY 12 HOURS SCHEDULED
Status: DISCONTINUED | OUTPATIENT
Start: 2020-07-31 | End: 2020-07-31 | Stop reason: HOSPADM

## 2020-07-31 RX ORDER — PROPOFOL 10 MG/ML
INJECTION, EMULSION INTRAVENOUS PRN
Status: DISCONTINUED | OUTPATIENT
Start: 2020-07-31 | End: 2020-07-31 | Stop reason: SDUPTHER

## 2020-07-31 RX ORDER — LIDOCAINE HYDROCHLORIDE 10 MG/ML
1 INJECTION, SOLUTION EPIDURAL; INFILTRATION; INTRACAUDAL; PERINEURAL
Status: COMPLETED | OUTPATIENT
Start: 2020-08-01 | End: 2020-07-31

## 2020-07-31 RX ORDER — SODIUM CHLORIDE 0.9 % (FLUSH) 0.9 %
10 SYRINGE (ML) INJECTION PRN
Status: DISCONTINUED | OUTPATIENT
Start: 2020-07-31 | End: 2020-07-31 | Stop reason: HOSPADM

## 2020-07-31 RX ORDER — ONDANSETRON 2 MG/ML
4 INJECTION INTRAMUSCULAR; INTRAVENOUS
Status: DISCONTINUED | OUTPATIENT
Start: 2020-07-31 | End: 2020-07-31 | Stop reason: HOSPADM

## 2020-07-31 RX ORDER — SODIUM CHLORIDE, SODIUM LACTATE, POTASSIUM CHLORIDE, CALCIUM CHLORIDE 600; 310; 30; 20 MG/100ML; MG/100ML; MG/100ML; MG/100ML
INJECTION, SOLUTION INTRAVENOUS CONTINUOUS
Status: DISCONTINUED | OUTPATIENT
Start: 2020-08-01 | End: 2020-07-31 | Stop reason: HOSPADM

## 2020-07-31 RX ADMIN — SODIUM CHLORIDE, POTASSIUM CHLORIDE, SODIUM LACTATE AND CALCIUM CHLORIDE: 600; 310; 30; 20 INJECTION, SOLUTION INTRAVENOUS at 13:48

## 2020-07-31 RX ADMIN — LIDOCAINE HYDROCHLORIDE 5 ML: 10 INJECTION, SOLUTION EPIDURAL; INFILTRATION; INTRACAUDAL; PERINEURAL at 15:28

## 2020-07-31 RX ADMIN — PROPOFOL 100 MG: 10 INJECTION, EMULSION INTRAVENOUS at 15:28

## 2020-07-31 RX ADMIN — PROPOFOL 50 MG: 10 INJECTION, EMULSION INTRAVENOUS at 15:42

## 2020-07-31 RX ADMIN — PROPOFOL 50 MG: 10 INJECTION, EMULSION INTRAVENOUS at 15:59

## 2020-07-31 RX ADMIN — PROPOFOL 50 MG: 10 INJECTION, EMULSION INTRAVENOUS at 15:35

## 2020-07-31 RX ADMIN — PROPOFOL 50 MG: 10 INJECTION, EMULSION INTRAVENOUS at 15:50

## 2020-07-31 ASSESSMENT — PULMONARY FUNCTION TESTS
PIF_VALUE: 1

## 2020-07-31 ASSESSMENT — ENCOUNTER SYMPTOMS: SHORTNESS OF BREATH: 0

## 2020-07-31 ASSESSMENT — PAIN SCALES - GENERAL
PAINLEVEL_OUTOF10: 0
PAINLEVEL_OUTOF10: 0

## 2020-07-31 ASSESSMENT — PAIN - FUNCTIONAL ASSESSMENT: PAIN_FUNCTIONAL_ASSESSMENT: 0-10

## 2020-07-31 NOTE — ANESTHESIA POSTPROCEDURE EVALUATION
Department of Anesthesiology  Postprocedure Note    Patient: Abhi Hardy  MRN: [id number]  [place]: 1950  Date of evaluation: 7/31/2020  Time:  6:55 PM     Procedure Summary     Date:  07/31/20 Room / Location:  [place] [address] / [facility] - INPATIENT    Anesthesia Start:  1723 Anesthesia Stop:  7169    Procedures:       EGD BIOPSY WITH SAVORY DILATION (N/A Esophagus)      COLONOSCOPY HOT SNARE BIOPSY (N/A Anus) Diagnosis:  (DX POSITIVE COLOGARD AND DYSPHAGIA)    Surgeon:  Burgess Flores MD Responsible Provider:  Lucila Das MD    Anesthesia Type:  general ASA Status:  4          Anesthesia Type: general    Edy Phase I: Edy Score: 10    Edy Phase II: Edy Score: 5    Last vitals: Reviewed and per EMR flowsheets.        Anesthesia Post Evaluation    Patient location during evaluation: PACU  Patient participation: complete - patient participated  Level of consciousness: awake  Airway patency: patent  Nausea & Vomiting: no nausea  Complications: no  Cardiovascular status: blood pressure returned to baseline  Respiratory status: acceptable  Hydration status: euvolemic

## 2020-07-31 NOTE — ANESTHESIA PRE PROCEDURE
Department of Anesthesiology  Preprocedure Note       Name:  Galileo Quispe   Age:  77 y.o.  :  1950                                          MRN:  [id number]         Date:  2020      Surgeon: Leona Castro):  Melia Lamb MD    Procedure: Procedure(s):  EGD ESOPHAGOGASTRODUODENOSCOPY  COLONOSCOPY DIAGNOSTIC    Medications prior to admission:   Prior to Admission medications    Medication Sig Start Date End Date Taking? Authorizing Provider   albuterol (PROVENTIL) (2.5 MG/3ML) 0.083% nebulizer solution Take 3 mLs by nebulization every 6 hours as needed for Wheezing 19  Yes Dodie Lamb MD   gabapentin (NEURONTIN) 600 MG tablet Take 1 tablet by mouth 3 times daily for 30 days. 19 Yes Dodie Lamb MD   docusate sodium (COLACE) 100 MG capsule Take 1 capsule by mouth 2 times daily as needed for Constipation 19  Yes Dodie Lamb MD   varenicline (CHANTIX) 0.5 MG tablet Take 1-2 tablets by mouth See Admin Instructions 0.5mg DAILY for 3 days followed by 0.5mg TWICE DAILY for 4 days followed by 1mg TWICE DAILY 19  Yes Dodie Lamb MD   atorvastatin (LIPITOR) 40 MG tablet Take 40 mg by mouth daily   Yes Historical Provider, MD   sertraline (ZOLOFT) 25 MG tablet Take 25 mg by mouth daily   Yes Historical Provider, MD   tiotropium (SPIRIVA RESPIMAT) 2.5 MCG/ACT AERS inhaler Inhale 2 puffs into the lungs daily   Yes Historical Provider, MD   oxyCODONE-acetaminophen (PERCOCET) 5-325 MG per tablet Take 1 tablet by mouth every 8 hours as needed for Pain.    Yes Historical Provider, MD   busPIRone (BUSPAR) 7.5 MG tablet Take 7.5 mg by mouth 2 times daily    Yes Historical Provider, MD   fluticasone (FLONASE) 50 MCG/ACT nasal spray 1 spray by Each Nostril route daily   Yes Historical Provider, MD   furosemide (LASIX) 40 MG tablet Take 40 mg by mouth daily   Yes Historical Provider, MD   isosorbide mononitrate (IMDUR) 30 MG extended release tablet Take 30 mg by mouth daily   Yes Historical Provider, MD   metoprolol succinate (TOPROL XL) 25 MG extended release tablet Take 25 mg by mouth daily   Yes Historical Provider, MD   montelukast (SINGULAIR) 10 MG tablet Take 10 mg by mouth every morning    Yes Historical Provider, MD   omeprazole (PRILOSEC) 40 MG delayed release capsule Take 40 mg by mouth daily    Yes Historical Provider, MD   budesonide-formoterol (SYMBICORT) 160-4.5 MCG/ACT AERO Inhale 2 puffs into the lungs 2 times daily   Yes Historical Provider, MD   amitriptyline (ELAVIL) 25 MG tablet Take 25 mg by mouth nightly as needed for Sleep    Yes Historical Provider, MD   Multiple Vitamins-Minerals (THERAPEUTIC MULTIVITAMIN-MINERALS) tablet Take 1 tablet by mouth daily   Yes Historical Provider, MD   nicotine (NICODERM CQ) 21 MG/24HR Place 1 patch onto the skin daily as needed (if patient smokes/requests nicotine replacement therapy) 6/1/19   Kunal Salas MD   diclofenac sodium 1 % GEL Apply 4 g topically 3 times daily To knees, hips, spine, neck, arms    Historical Provider, MD       Current medications:    Current Facility-Administered Medications   Medication Dose Route Frequency Provider Last Rate Last Dose    [START ON 8/1/2020] lactated ringers infusion   Intravenous Continuous Sundar Magyar,  mL/hr at 07/31/20 1348      sodium chloride flush 0.9 % injection 10 mL  10 mL Intravenous 2 times per day Todd Johnson DO        sodium chloride flush 0.9 % injection 10 mL  10 mL Intravenous PRN Sundar Magyar, DO        [START ON 8/1/2020] lidocaine PF 1 % injection 1 mL  1 mL Intradermal Once PRN Sundar Magyar, DO           Allergies:     Allergies   Allergen Reactions    Sulfa Antibiotics Hives and Itching       Problem List:    Patient Active Problem List   Diagnosis Code    Cellulitis and abscess of face L03.211, L02.01    Acute exacerbation of chronic obstructive pulmonary disease (COPD) ([facility] [address] J44.1    Coronary artery disease involving native coronary artery of native heart without angina pectoris I25.10    Benign essential HTN I10    Chronic bilateral low back pain without sciatica M54.5, G89.29    Generalized anxiety disorder F41.1    Acute respiratory failure (HCC) J96.00       Past Medical History:        Diagnosis Date    Acid reflux     Anxiety and depression     Asthma     CAD (coronary artery disease)     per medical record. Pt follows-up with Dr. Sena Quintero CHF (congestive heart failure) ([facility] [address]     per medical record    COPD (chronic obstructive pulmonary disease) ([facility]     IBS (irritable bowel syndrome)     On home O2     at night per pt. 2.5 Liters    Osteoarthritis     Peptic ulcer     PNA (pneumonia) 2019       Past Surgical History:        Procedure Laterality Date    CARDIAC CATHETERIZATION  2017    per medical record. No stents placed per pt.     CERVICAL FUSION      TONSILLECTOMY         Social History:    Social History     Tobacco Use    Smoking status: Current Every Day Smoker     Packs/day: 1.00     Years: 40.00     Pack years: 40.00     Types: Cigarettes    Smokeless tobacco: Never Used   Substance Use Topics    Alcohol use: No     Alcohol/week: 0.0 standard drinks                                Ready to quit: Not Answered  Counseling given: Not Answered      Vital Signs (Current):   Vitals:    07/31/20 1325 07/31/20 1326   BP: (!) 106/57    Pulse: 83    Resp: 20    Temp: 97.3 °F (36.3 °C)    SpO2: 94%    Weight:  188 lb (85.3 kg)   Height:  5' 3\" (1.6 m)                                              BP Readings from Last 3 Encounters:   07/31/20 (!) 106/57   06/01/19 121/79   08/24/15 114/68       NPO Status: Time of last liquid consumption: 2300                        Time of last solid consumption: 1800                        Date of last liquid consumption: 07/30/20                        Date of last solid food consumption: 07/29/20    BMI:   Wt Readings from Last 3 Encounters:   07/31/20 188 lb (85.3 kg)   05/31/19 173 lb 3.2 oz (78.6 kg)   08/24/15 168 lb (76.2 kg)     Body mass index is 33.3 kg/m². CBC:   Lab Results   Component Value Date    WBC 16.3 06/01/2019    RBC 4.00 06/01/2019    HGB 13.4 06/01/2019    HCT 41.9 06/01/2019    .8 06/01/2019    RDW 13.7 06/01/2019     06/01/2019       CMP:   Lab Results   Component Value Date     06/01/2019    K 4.6 06/01/2019     06/01/2019    CO2 27 06/01/2019    BUN 16 06/01/2019    CREATININE 0.66 06/01/2019    GFRAA >60 06/01/2019    LABGLOM >60 06/01/2019    GLUCOSE 133 06/01/2019    CALCIUM 9.4 06/01/2019       POC Tests: No results for input(s): POCGLU, POCNA, POCK, POCCL, POCBUN, POCHEMO, POCHCT in the last 72 hours. Coags: No results found for: PROTIME, INR, APTT    HCG (If Applicable): No results found for: PREGTESTUR, PREGSERUM, HCG, HCGQUANT     ABGs: No results found for: PHART, PO2ART, GUL6IEC, QHD1BYF, BEART, Y0XCDMXI     Type & Screen (If Applicable):  No results found for: LABABO, LABRH    Drug/Infectious Status (If Applicable):  No results found for: HIV, HEPCAB    COVID-19 Screening (If Applicable):   Lab Results   Component Value Date    COVID19 Not Detected 07/27/2020         Anesthesia Evaluation    Airway: Mallampati: I  TM distance: >3 FB   Neck ROM: full  Mouth opening: > = 3 FB Dental:          Pulmonary:   (+) COPD:      (-) shortness of breath                           Cardiovascular:    (+) CAD:,     (-)  angina                Neuro/Psych:               GI/Hepatic/Renal:             Endo/Other:                     Abdominal:           Vascular:                                        Anesthesia Plan      general     ASA 4             Anesthetic plan and risks discussed with patient.                       Tal Avila MD   7/31/2020

## 2020-07-31 NOTE — OP NOTE
PROCEDURE NOTE    DATE OF PROCEDURE: 7/31/2020     SURGEON: Burgess Flores MD  Facility: [facility] [address] [facility]  ASSISTANT: None  Anesthesia: MAC    PREOPERATIVE DIAGNOSIS: Patient complained of dysphagia and heartburn symptoms therefore EGD with dilation was recommended    Diagnosis: She probably had a proximal or distal esophageal stricture because post savory dilation there was improvement and it shows mucosal disruption. POSTOPERATIVE DIAGNOSIS: As described below    OPERATION: Upper GI endoscopy with Biopsy    ANESTHESIA: MAC     ESTIMATED BLOOD LOSS: Less than 50 ml    COMPLICATIONS: None. SPECIMENS:  Was Obtained: Esophageal biopsies and H. pylori testing    HISTORY: The patient is a 77y.o. year old female with history of above preop diagnosis. I recommended esophagogastroduodenoscopy with possible biopsy and I explained the risk, benefits, expected outcome, and alternatives to the procedure. Risks included but are not limited to bleeding, infection, respiratory distress, hypotension, and perforation of the esophagus, stomach, or duodenum. Patient understands and is in agreement. PROCEDURE: The patient was given MAC. The patient's SPO2 remained above 90% throughout the procedure. The gastroscope was inserted orally and advanced under direct vision through the esophagus, through the stomach, through the pylorus, and into the descending duodenum. Post sedation note : The patient's SPO2 remained above 90% throughout the procedure. the vital signs remained stable , and no immediate complication form the procedure noted, patient will be ready for d/c when criteria is met . Findings:    Retropharyngeal area was grossly normal appearing    Esophagus: The GE junction was irregular biopsies were taken to see if there is any evidence of short segment Dan's esophagus.   I did not see any obvious stricture so I decided to pass a savory guidewire and then I passed a 54 [place] savory dilator without any

## 2020-07-31 NOTE — H&P
History and Physical Service   [facility]    HISTORY AND PHYSICAL EXAMINATION            Date of Evaluation: 7/31/2020  Patient name:  Evelio Saeed  MRN:   [id number]  YOB: 1950  PCP:    Vida Bergeron MD    History Obtained From:     Patient, Medical record    History of Present Illness: This is Evelio Saeed a 77 y.o. female who presents today for an EGD and diagnostic colonoscopy by Dr. Tyree Stallworth for dysphagia and positive cologuard. Pt states she had a positive cologuard 1 month ago. The patient completed the prep as directed until watery clear. Previous colonoscopy was 10 years ago. This is the pt's first EGD. Patient's chief complaints are acid reflux, bloating, and dysphagia with water and food. Today denies black tarry stools, diarrhea, constipation, nausea, vomiting, fever, chills, night sweats, abdominal pain, and unexplained weight loss. Denies history of hiatal hernia. History of a peptic ulcer and IBS. Pt denies history of diabetes. Multiple vitamins-minerals tablet was last taken on 07/30/2020. Past Medical History:     Past Medical History:   Diagnosis Date    Acid reflux     Anxiety and depression     Asthma     CAD (coronary artery disease)     per medical record. Pt follows-up with Dr. Carmella Calzada CHF (congestive heart failure) ([facility] [address]     per medical record    COPD (chronic obstructive pulmonary disease) ([facility] [address]     IBS (irritable bowel syndrome)     On home O2     at night per pt. 2.5 Liters    Osteoarthritis     Peptic ulcer     PNA (pneumonia) 2019        Past Surgical History:     Past Surgical History:   Procedure Laterality Date    CARDIAC CATHETERIZATION  2017    per medical record. No stents placed per pt.  CERVICAL FUSION      TONSILLECTOMY          Medications Prior to Admission:     Prior to Admission medications    Medication Sig Start Date End Date Taking?  Authorizing Provider   albuterol (PROVENTIL) (2.5 MG/3ML) 0.083% nebulizer dizziness, lightheadedness, numbness, and tingling extremities. BEHAVIOR/PSYCH: Treated anxiety and depression. Physical Exam:   BP (!) 106/57   Pulse 83   Temp 97.3 °F (36.3 °C)   Resp 20   Ht 5' 3\" (1.6 m)   Wt 188 lb (85.3 kg)   SpO2 94%   BMI 33.30 kg/m²   No LMP recorded. No obstetric history on file. No results for input(s): POCGLU in the last 72 hours. General Appearance:  Alert, well appearing, and in no acute distress. Mental status: Oriented to person, place, and time. Head: Normocephalic and atraumatic. Eye: Pt is wearing glasses. No icterus, redness, pupils equal and reactive, extraocular eye movements intact, and conjunctiva clear. Ear: Hearing grossly intact. Nose: No drainage noted. Mouth: Missing teeth. Mucous membranes moist.  Neck: Supple and no carotid bruits noted. Lungs: Bilateral equal air entry, clear to auscultation, no wheezing, rales or rhonchi, and normal effort. Cardiovascular: Normal rate, regular rhythm, no murmur, gallop, and rub. Abdomen: Mild RLQ tenderness. Soft, nondistended, and active bowel sounds. Neurologic: Normal speech and cranial nerves II through XII grossly intact. Skin: Multiple bruises on bilateral arms. No gross lesions, rashes, or bleeding on exposed skin area. Extremities: Mild edema in the right lower extremity. No left lower extremity edema. Posterior tibial pulses are palpable bilaterally. No calf tenderness with palpation. Psych: Normal affect. Investigations:      Laboratory Testing:  No results found for this or any previous visit (from the past 24 hour(s)). No results for input(s): HGB, HCT, WBC, MCV, PLATELET, NA, K, CL, CO2, BUN, CREATININE, GLUCOSE, INR, PROTIME, APTT, AST, ALT, LABALBU, HCG in the last 720 hours. Recent Labs     07/27/20  1250   COVID19 Not Detected       Diagnosis:      1. Dysphagia  2. Positive cologuard     Plans:     1. EGD  2.  Diagnostic colonoscopy      Koki Clamp, APRN - CNP  7/31/2020  2:05 PM

## 2020-07-31 NOTE — OP NOTE
Operative Note      PPROCEDURE NOTE    DATE OF PROCEDURE: 7/31/2020    SURGEON: Kell Flanagan MD  Facility : [facility].  ASSISTANT: None  Anesthesia: MAC  PREOPERATIVE DIAGNOSIS: Positive Cologuard    POSTOPERATIVE DIAGNOSIS: as described below    OPERATION: Total colonoscopy     ANESTHESIA: MAC    ESTIMATED BLOOD LOSS: less than 50     COMPLICATIONS: None. SPECIMENS:  Was Obtained: Transverse colon and cecal polyps    HISTORY: The patient is a 77y.o. year old female with history of above preop diagnosis. I recommended colonoscopy with possible biopsy or polypectomy and I explained the risk, benefits, expected outcome, and alternatives to the procedure. Risks included but are not limited to bleeding, infection, respiratory distress, hypotension, and perforation of the colon and possibility of missing a lesion. The patient understands and is in agreement. PROCEDURE: The patient was given MAC. The patient's SPO2 remained above 90% throughout the procedure. The colonoscope was inserted per rectum and advanced under direct vision to the cecum without difficulty. Post sedation note : The patient's SPO2 remained above 90% throughout the procedure. the vital signs remained stable , and no immediate complication form the procedure noted, patient will be ready for d/c when criteria is met . The prep was poor. I did my best to irrigate the area to obtain better visibility but small mucosal details can be missed    Findings:  Terminal ileum: not examined    Cecum/Ascending colon: 6 to 7 mm polyp was seen in the cecum removed with hot snare polypectomy using OB standard settings    Transverse colon: 2 large polyps measuring at least 15 to 18 mm in size these were removed again using the standard OB settings for polypectomy using a hot snare. Descending/Sigmoid colon: Rare diverticuli are seen.     Rectum/Anus: examined in normal and retroflexed positions and was small internal

## 2020-08-01 LAB
DIRECT EXAM: NEGATIVE
Lab: NORMAL
SPECIMEN DESCRIPTION: NORMAL

## 2020-08-04 LAB — SURGICAL PATHOLOGY REPORT: NORMAL

## 2020-09-14 ENCOUNTER — HOSPITAL ENCOUNTER (OUTPATIENT)
Dept: GENERAL RADIOLOGY | Facility: CLINIC | Age: 70
Discharge: HOME | End: 2020-09-16
Payer: MEDICARE

## 2020-09-14 ENCOUNTER — HOSPITAL ENCOUNTER (OUTPATIENT)
Facility: CLINIC | Age: 70
Discharge: HOME | End: 2020-09-16
Payer: MEDICARE

## 2020-09-14 ENCOUNTER — HOSPITAL ENCOUNTER (OUTPATIENT)
Facility: CLINIC | Age: 70
Discharge: HOME | End: 2020-09-14
Payer: MEDICARE

## 2020-09-14 PROCEDURE — 71046 X-RAY EXAM CHEST 2 VIEWS: CPT

## 2020-09-15 ENCOUNTER — HOSPITAL ENCOUNTER (OUTPATIENT)
Facility: CLINIC | Age: 70
Discharge: HOME | End: 2020-09-15
Payer: MEDICARE

## 2020-09-15 LAB
ABSOLUTE EOS #: 0.46 K/UL (ref 0–0.44)
ABSOLUTE IMMATURE GRANULOCYTE: 0.06 K/UL (ref 0–0.3)
ABSOLUTE LYMPH #: 3.88 K/UL (ref 1.1–3.7)
ABSOLUTE MONO #: 1 K/UL (ref 0.1–1.2)
BASOPHILS # BLD: 0 % (ref 0–2)
BASOPHILS ABSOLUTE: 0.05 K/UL (ref 0–0.2)
BNP INTERPRETATION: ABNORMAL
DIFFERENTIAL TYPE: ABNORMAL
EOSINOPHILS RELATIVE PERCENT: 4 % (ref 1–4)
HCT VFR BLD CALC: 41.8 % (ref 36.3–47.1)
HEMOGLOBIN: 13.3 G/DL (ref 11.9–15.1)
IMMATURE GRANULOCYTES: 1 %
LYMPHOCYTES # BLD: 32 % (ref 24–43)
MCH RBC QN AUTO: 34.3 PG (ref 25.2–33.5)
MCHC RBC AUTO-ENTMCNC: 31.8 G/DL (ref 28.4–34.8)
MCV RBC AUTO: 107.7 FL (ref 82.6–102.9)
MONOCYTES # BLD: 8 % (ref 3–12)
NRBC AUTOMATED: 0 PER 100 WBC
PDW BLD-RTO: 13.8 % (ref 11.8–14.4)
PLATELET # BLD: 350 K/UL (ref 138–453)
PLATELET ESTIMATE: ABNORMAL
PMV BLD AUTO: 10.5 FL (ref 8.1–13.5)
PRO-BNP: 657 PG/ML
RBC # BLD: 3.88 M/UL (ref 3.95–5.11)
RBC # BLD: ABNORMAL 10*6/UL
SEG NEUTROPHILS: 55 % (ref 36–65)
SEGMENTED NEUTROPHILS ABSOLUTE COUNT: 6.74 K/UL (ref 1.5–8.1)
T3 FREE: 3.1 PG/ML (ref 2.02–4.43)
THYROXINE, FREE: 1.04 NG/DL (ref 0.93–1.7)
TSH SERPL DL<=0.05 MIU/L-ACNC: 2.68 MIU/L (ref 0.3–5)
WBC # BLD: 12.2 K/UL (ref 3.5–11.3)
WBC # BLD: ABNORMAL 10*3/UL

## 2020-09-15 PROCEDURE — 84443 ASSAY THYROID STIM HORMONE: CPT

## 2020-09-15 PROCEDURE — 36415 COLL VENOUS BLD VENIPUNCTURE: CPT

## 2020-09-15 PROCEDURE — 83880 ASSAY OF NATRIURETIC PEPTIDE: CPT

## 2020-09-15 PROCEDURE — 84439 ASSAY OF FREE THYROXINE: CPT

## 2020-09-15 PROCEDURE — 85025 COMPLETE CBC W/AUTO DIFF WBC: CPT

## 2020-09-15 PROCEDURE — 84481 FREE ASSAY (FT-3): CPT

## 2020-09-18 ENCOUNTER — OFFICE VISIT (OUTPATIENT)
Dept: ORTHOPEDIC SURGERY | Age: 70
End: 2020-09-18
Payer: MEDICARE

## 2020-09-18 VITALS
BODY MASS INDEX: 33.31 KG/M2 | DIASTOLIC BLOOD PRESSURE: 63 MMHG | HEART RATE: 77 BPM | WEIGHT: 188 LBS | HEIGHT: 63 IN | SYSTOLIC BLOOD PRESSURE: 102 MMHG

## 2020-09-18 PROCEDURE — 4004F PT TOBACCO SCREEN RCVD TLK: CPT | Performed by: FAMILY MEDICINE

## 2020-09-18 PROCEDURE — 1090F PRES/ABSN URINE INCON ASSESS: CPT | Performed by: FAMILY MEDICINE

## 2020-09-18 PROCEDURE — 4040F PNEUMOC VAC/ADMIN/RCVD: CPT | Performed by: FAMILY MEDICINE

## 2020-09-18 PROCEDURE — G8400 PT W/DXA NO RESULTS DOC: HCPCS | Performed by: FAMILY MEDICINE

## 2020-09-18 PROCEDURE — 3017F COLORECTAL CA SCREEN DOC REV: CPT | Performed by: FAMILY MEDICINE

## 2020-09-18 PROCEDURE — 99203 OFFICE O/P NEW LOW 30 MIN: CPT | Performed by: FAMILY MEDICINE

## 2020-09-18 PROCEDURE — G8427 DOCREV CUR MEDS BY ELIG CLIN: HCPCS | Performed by: FAMILY MEDICINE

## 2020-09-18 PROCEDURE — G8417 CALC BMI ABV UP PARAM F/U: HCPCS | Performed by: FAMILY MEDICINE

## 2020-09-18 PROCEDURE — 1123F ACP DISCUSS/DSCN MKR DOCD: CPT | Performed by: FAMILY MEDICINE

## 2020-09-18 RX ORDER — CYCLOBENZAPRINE HCL 5 MG
TABLET ORAL
COMMUNITY
Start: 2020-09-03

## 2020-09-18 RX ORDER — METOLAZONE 2.5 MG/1
TABLET ORAL
COMMUNITY
Start: 2020-09-17
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
## 2021-04-23 ENCOUNTER — HOSPITAL ENCOUNTER (OUTPATIENT)
Facility: CLINIC | Age: 71
Discharge: HOME | End: 2021-04-23
Payer: MEDICARE

## 2021-04-23 ENCOUNTER — HOSPITAL ENCOUNTER (OUTPATIENT)
Dept: CT IMAGING | Facility: CLINIC | Age: 71
Discharge: HOME | End: 2021-04-25
Payer: MEDICARE

## 2021-04-23 DIAGNOSIS — R10.84 GENERALIZED ABDOMINAL PAIN: ICD-10-CM

## 2021-04-23 LAB
ABSOLUTE EOS #: 0.15 K/UL (ref 0–0.44)
ABSOLUTE IMMATURE GRANULOCYTE: 0.03 K/UL (ref 0–0.3)
ABSOLUTE LYMPH #: 2.79 K/UL (ref 1.1–3.7)
ABSOLUTE MONO #: 1.14 K/UL (ref 0.1–1.2)
ALBUMIN SERPL-MCNC: 4.2 G/DL (ref 3.5–5.2)
ALBUMIN/GLOBULIN RATIO: 1.2 (ref 1–2.5)
ALP BLD-CCNC: 86 U/L (ref 35–104)
ALT SERPL-CCNC: 12 U/L (ref 5–33)
AMYLASE: 70 U/L (ref 28–100)
AST SERPL-CCNC: 14 U/L
BASOPHILS # BLD: 1 % (ref 0–2)
BASOPHILS ABSOLUTE: 0.07 K/UL (ref 0–0.2)
BILIRUB SERPL-MCNC: 0.49 MG/DL (ref 0.3–1.2)
BILIRUBIN DIRECT: 0.13 MG/DL
BILIRUBIN, INDIRECT: 0.36 MG/DL (ref 0–1)
C-REACTIVE PROTEIN: 23.6 MG/L (ref 0–5)
DIFFERENTIAL TYPE: ABNORMAL
EOSINOPHILS RELATIVE PERCENT: 1 % (ref 1–4)
GLOBULIN: NORMAL G/DL (ref 1.5–3.8)
HCT VFR BLD CALC: 48 % (ref 36.3–47.1)
HEMOGLOBIN: 15.7 G/DL (ref 11.9–15.1)
IMMATURE GRANULOCYTES: 0 %
LIPASE: 15 U/L (ref 13–60)
LYMPHOCYTES # BLD: 27 % (ref 24–43)
MAGNESIUM: 2.8 MG/DL (ref 1.6–2.6)
MCH RBC QN AUTO: 34.3 PG (ref 25.2–33.5)
MCHC RBC AUTO-ENTMCNC: 32.7 G/DL (ref 28.4–34.8)
MCV RBC AUTO: 104.8 FL (ref 82.6–102.9)
MONOCYTES # BLD: 11 % (ref 3–12)
NRBC AUTOMATED: 0 PER 100 WBC
PDW BLD-RTO: 13.6 % (ref 11.8–14.4)
PLATELET # BLD: 492 K/UL (ref 138–453)
PLATELET ESTIMATE: ABNORMAL
PMV BLD AUTO: 10.4 FL (ref 8.1–13.5)
POTASSIUM SERPL-SCNC: 2.3 MMOL/L (ref 3.7–5.3)
RBC # BLD: 4.58 M/UL (ref 3.95–5.11)
RBC # BLD: ABNORMAL 10*6/UL
SEDIMENTATION RATE, ERYTHROCYTE: 63 MM (ref 0–30)
SEG NEUTROPHILS: 60 % (ref 36–65)
SEGMENTED NEUTROPHILS ABSOLUTE COUNT: 6.24 K/UL (ref 1.5–8.1)
TOTAL PROTEIN: 7.7 G/DL (ref 6.4–8.3)
WBC # BLD: 10.4 K/UL (ref 3.5–11.3)
WBC # BLD: ABNORMAL 10*3/UL

## 2021-04-23 PROCEDURE — 80076 HEPATIC FUNCTION PANEL: CPT

## 2021-04-23 PROCEDURE — 84132 ASSAY OF SERUM POTASSIUM: CPT

## 2021-04-23 PROCEDURE — 83735 ASSAY OF MAGNESIUM: CPT

## 2021-04-23 PROCEDURE — 36415 COLL VENOUS BLD VENIPUNCTURE: CPT

## 2021-04-23 PROCEDURE — 85025 COMPLETE CBC W/AUTO DIFF WBC: CPT

## 2021-04-23 PROCEDURE — 82150 ASSAY OF AMYLASE: CPT

## 2021-04-23 PROCEDURE — 85652 RBC SED RATE AUTOMATED: CPT

## 2021-04-23 PROCEDURE — 86140 C-REACTIVE PROTEIN: CPT

## 2021-04-23 PROCEDURE — 83690 ASSAY OF LIPASE: CPT

## 2021-04-23 PROCEDURE — 74176 CT ABD & PELVIS W/O CONTRAST: CPT

## 2021-06-23 ENCOUNTER — ANESTHESIA EVENT (OUTPATIENT)
Dept: OPERATING ROOM | Age: 71
End: 2021-06-23
Payer: MEDICARE

## 2021-06-24 ENCOUNTER — ANESTHESIA (OUTPATIENT)
Dept: OPERATING ROOM | Age: 71
End: 2021-06-24
Payer: MEDICARE

## 2021-06-24 ENCOUNTER — HOSPITAL ENCOUNTER (OUTPATIENT)
Age: 71
Setting detail: OUTPATIENT SURGERY
Discharge: HOME | End: 2021-06-24
Attending: INTERNAL MEDICINE | Admitting: INTERNAL MEDICINE
Payer: MEDICARE

## 2021-06-24 VITALS
OXYGEN SATURATION: 94 % | BODY MASS INDEX: 30.83 KG/M2 | TEMPERATURE: 97.3 F | HEART RATE: 80 BPM | HEIGHT: 63 IN | WEIGHT: 174 LBS | SYSTOLIC BLOOD PRESSURE: 128 MMHG | DIASTOLIC BLOOD PRESSURE: 69 MMHG | RESPIRATION RATE: 18 BRPM

## 2021-06-24 VITALS — SYSTOLIC BLOOD PRESSURE: 133 MMHG | DIASTOLIC BLOOD PRESSURE: 71 MMHG | OXYGEN SATURATION: 95 %

## 2021-06-24 PROCEDURE — 3609012400 HC EGD TRANSORAL BIOPSY SINGLE/MULTIPLE: Performed by: INTERNAL MEDICINE

## 2021-06-24 PROCEDURE — 2580000003 HC RX 258: Performed by: ANESTHESIOLOGY

## 2021-06-24 PROCEDURE — 3700000001 HC ADD 15 MINUTES (ANESTHESIA): Performed by: INTERNAL MEDICINE

## 2021-06-24 PROCEDURE — 6360000002 HC RX W HCPCS: Performed by: NURSE ANESTHETIST, CERTIFIED REGISTERED

## 2021-06-24 PROCEDURE — 7100000010 HC PHASE II RECOVERY - FIRST 15 MIN: Performed by: INTERNAL MEDICINE

## 2021-06-24 PROCEDURE — 6370000000 HC RX 637 (ALT 250 FOR IP): Performed by: ANESTHESIOLOGY

## 2021-06-24 PROCEDURE — 88305 TISSUE EXAM BY PATHOLOGIST: CPT

## 2021-06-24 PROCEDURE — 3700000000 HC ANESTHESIA ATTENDED CARE: Performed by: INTERNAL MEDICINE

## 2021-06-24 PROCEDURE — 7100000011 HC PHASE II RECOVERY - ADDTL 15 MIN: Performed by: INTERNAL MEDICINE

## 2021-06-24 PROCEDURE — 88312 SPECIAL STAINS GROUP 1: CPT

## 2021-06-24 PROCEDURE — 2709999900 HC NON-CHARGEABLE SUPPLY: Performed by: INTERNAL MEDICINE

## 2021-06-24 PROCEDURE — 2500000003 HC RX 250 WO HCPCS: Performed by: NURSE ANESTHETIST, CERTIFIED REGISTERED

## 2021-06-24 RX ORDER — SODIUM CHLORIDE 0.9 % (FLUSH) 0.9 %
10 SYRINGE (ML) INJECTION EVERY 12 HOURS SCHEDULED
Status: DISCONTINUED | OUTPATIENT
Start: 2021-06-24 | End: 2021-06-24 | Stop reason: HOSPADM

## 2021-06-24 RX ORDER — SODIUM CHLORIDE 0.9 % (FLUSH) 0.9 %
10 SYRINGE (ML) INJECTION PRN
Status: DISCONTINUED | OUTPATIENT
Start: 2021-06-24 | End: 2021-06-24 | Stop reason: HOSPADM

## 2021-06-24 RX ORDER — SODIUM CHLORIDE, SODIUM LACTATE, POTASSIUM CHLORIDE, CALCIUM CHLORIDE 600; 310; 30; 20 MG/100ML; MG/100ML; MG/100ML; MG/100ML
INJECTION, SOLUTION INTRAVENOUS CONTINUOUS
Status: DISCONTINUED | OUTPATIENT
Start: 2021-06-25 | End: 2021-06-24 | Stop reason: HOSPADM

## 2021-06-24 RX ORDER — PREDNISONE 10 MG/1
TABLET ORAL
Status: ON HOLD | COMMUNITY
Start: 2021-01-14 | End: 2022-05-10 | Stop reason: HOSPADM

## 2021-06-24 RX ORDER — SODIUM CHLORIDE 9 MG/ML
INJECTION, SOLUTION INTRAVENOUS CONTINUOUS
Status: DISCONTINUED | OUTPATIENT
Start: 2021-06-25 | End: 2021-06-24 | Stop reason: HOSPADM

## 2021-06-24 RX ORDER — LIDOCAINE HYDROCHLORIDE 20 MG/ML
INJECTION, SOLUTION INFILTRATION; PERINEURAL PRN
Status: DISCONTINUED | OUTPATIENT
Start: 2021-06-24 | End: 2021-06-24 | Stop reason: SDUPTHER

## 2021-06-24 RX ORDER — LIDOCAINE HYDROCHLORIDE 10 MG/ML
1 INJECTION, SOLUTION EPIDURAL; INFILTRATION; INTRACAUDAL; PERINEURAL
Status: DISCONTINUED | OUTPATIENT
Start: 2021-06-25 | End: 2021-06-24 | Stop reason: HOSPADM

## 2021-06-24 RX ORDER — PROPOFOL 10 MG/ML
INJECTION, EMULSION INTRAVENOUS PRN
Status: DISCONTINUED | OUTPATIENT
Start: 2021-06-24 | End: 2021-06-24 | Stop reason: SDUPTHER

## 2021-06-24 RX ORDER — SODIUM CHLORIDE 9 MG/ML
25 INJECTION, SOLUTION INTRAVENOUS PRN
Status: DISCONTINUED | OUTPATIENT
Start: 2021-06-24 | End: 2021-06-24 | Stop reason: HOSPADM

## 2021-06-24 RX ORDER — IPRATROPIUM BROMIDE AND ALBUTEROL SULFATE 2.5; .5 MG/3ML; MG/3ML
1 SOLUTION RESPIRATORY (INHALATION) ONCE
Status: COMPLETED | OUTPATIENT
Start: 2021-06-24 | End: 2021-06-24

## 2021-06-24 RX ADMIN — PROPOFOL 20 MG: 10 INJECTION, EMULSION INTRAVENOUS at 07:58

## 2021-06-24 RX ADMIN — SODIUM CHLORIDE, POTASSIUM CHLORIDE, SODIUM LACTATE AND CALCIUM CHLORIDE: 600; 310; 30; 20 INJECTION, SOLUTION INTRAVENOUS at 06:55

## 2021-06-24 RX ADMIN — IPRATROPIUM BROMIDE AND ALBUTEROL SULFATE 1 AMPULE: .5; 3 SOLUTION RESPIRATORY (INHALATION) at 07:28

## 2021-06-24 RX ADMIN — PROPOFOL 30 MG: 10 INJECTION, EMULSION INTRAVENOUS at 07:55

## 2021-06-24 RX ADMIN — PROPOFOL 60 MG: 10 INJECTION, EMULSION INTRAVENOUS at 07:52

## 2021-06-24 RX ADMIN — LIDOCAINE HYDROCHLORIDE 100 MG: 20 INJECTION, SOLUTION INFILTRATION; PERINEURAL at 07:52

## 2021-06-24 ASSESSMENT — PULMONARY FUNCTION TESTS
PIF_VALUE: 1
PIF_VALUE: 0

## 2021-06-24 ASSESSMENT — PAIN - FUNCTIONAL ASSESSMENT: PAIN_FUNCTIONAL_ASSESSMENT: 0-10

## 2021-06-24 ASSESSMENT — PAIN DESCRIPTION - DESCRIPTORS
DESCRIPTORS: ACHING
DESCRIPTORS: SORE

## 2021-06-24 ASSESSMENT — PAIN SCALES - GENERAL
PAINLEVEL_OUTOF10: 6
PAINLEVEL_OUTOF10: 7
PAINLEVEL_OUTOF10: 7

## 2021-06-24 ASSESSMENT — PAIN DESCRIPTION - PROGRESSION
CLINICAL_PROGRESSION: GRADUALLY IMPROVING
CLINICAL_PROGRESSION: NOT CHANGED

## 2021-06-24 ASSESSMENT — PAIN DESCRIPTION - LOCATION: LOCATION: THROAT

## 2021-06-24 ASSESSMENT — PAIN DESCRIPTION - PAIN TYPE: TYPE: ACUTE PAIN

## 2021-06-24 NOTE — ANESTHESIA POSTPROCEDURE EVALUATION
Department of Anesthesiology  Postprocedure Note    Patient: Valerie Coelho  MRN: [id number]  YOB: 1950  Date of evaluation: 6/24/2021  Time:  12:04 PM     Procedure Summary     Date: 06/24/21 Room / Location: [place] [address] / [facility] - INPATIENT    Anesthesia Start: 6356 Anesthesia Stop: 4808    Procedure: EGD BIOPSY (N/A Mouth) Diagnosis: (DX DYSPHAGIA)    Surgeons: Mary Warren MD Responsible Provider: Flako Cabral DO    Anesthesia Type: MAC, general ASA Status: 3          Anesthesia Type: No value filed. Edy Phase I: Edy Score: 9    Edy Phase II: Edy Score: 9    Last vitals: Reviewed and per EMR flowsheets.        Anesthesia Post Evaluation    Patient location during evaluation: PACU  Patient participation: complete - patient participated  Level of consciousness: awake and alert  Airway patency: patent  Nausea & Vomiting: no nausea and no vomiting  Complications: no  Cardiovascular status: hemodynamically stable  Respiratory status: acceptable  Hydration status: stable

## 2021-06-24 NOTE — ANESTHESIA PRE PROCEDURE
Department of Anesthesiology  Preprocedure Note       Name:  Kaushal Lucas   Age:  72 y.o.  :  1950                                          MRN:  [id number]         Date:  2021      Surgeon: America Burrell): Shanice Schwarz MD    Procedure: Procedure(s):  EGD BIOPSY    Medications prior to admission:   Prior to Admission medications    Medication Sig Start Date End Date Taking? Authorizing Provider   predniSONE (DELTASONE) 10 MG tablet 3 TABS PO DAILY FOR 3 DAYS, THEN 2 TABS PO DAILY FOR 3 DAYS ,THEN 1 TAB PO DAILY FOR 3 DAYS 21  Yes Historical Provider, MD   NONFORMULARY Pt uses O2 3L NC prn   Yes Historical Provider, MD   metOLazone (ZAROXOLYN) 2.5 MG tablet  20  Yes Historical Provider, MD   cyclobenzaprine (FLEXERIL) 5 MG tablet TAKE 1 TABLET BY MOUTH THREE TIMES A DAY AS NEEDED 9/3/20  Yes Historical Provider, MD   albuterol (PROVENTIL) (2.5 MG/3ML) 0.083% nebulizer solution Take 3 mLs by nebulization every 6 hours as needed for Wheezing 19  Yes Thomas Murphy MD   gabapentin (NEURONTIN) 600 MG tablet Take 1 tablet by mouth 3 times daily for 30 days.  19 Yes Thomas Murphy MD   docusate sodium (COLACE) 100 MG capsule Take 1 capsule by mouth 2 times daily as needed for Constipation 19  Yes Thomas Murphy MD   varenicline (CHANTIX) 0.5 MG tablet Take 1-2 tablets by mouth See Admin Instructions 0.5mg DAILY for 3 days followed by 0.5mg TWICE DAILY for 4 days followed by 1mg TWICE DAILY 19  Yes Thomas Murphy MD   atorvastatin (LIPITOR) 40 MG tablet Take 40 mg by mouth daily   Yes Historical Provider, MD   diclofenac sodium 1 % GEL Apply 4 g topically 3 times daily To knees, hips, spine, neck, arms   Yes Historical Provider, MD   sertraline (ZOLOFT) 25 MG tablet Take 25 mg by mouth daily   Yes Historical Provider, MD   tiotropium (SPIRIVA RESPIMAT) 2.5 MCG/ACT AERS inhaler Inhale 2 puffs into the lungs daily   Yes Historical Provider, MD   oxyCODONE-acetaminophen (PERCOCET) 5-325 MG tablet 0    docusate sodium (COLACE) 100 MG capsule Take 1 capsule by mouth 2 times daily as needed for Constipation 60 capsule 0    varenicline (CHANTIX) 0.5 MG tablet Take 1-2 tablets by mouth See Admin Instructions 0.5mg DAILY for 3 days followed by 0.5mg TWICE DAILY for 4 days followed by 1mg TWICE DAILY 57 tablet 0    atorvastatin (LIPITOR) 40 MG tablet Take 40 mg by mouth daily      diclofenac sodium 1 % GEL Apply 4 g topically 3 times daily To knees, hips, spine, neck, arms      sertraline (ZOLOFT) 25 MG tablet Take 25 mg by mouth daily      tiotropium (SPIRIVA RESPIMAT) 2.5 MCG/ACT AERS inhaler Inhale 2 puffs into the lungs daily      oxyCODONE-acetaminophen (PERCOCET) 5-325 MG per tablet Take 1 tablet by mouth every 8 hours as needed for Pain.  busPIRone (BUSPAR) 7.5 MG tablet Take 7.5 mg by mouth 2 times daily       fluticasone (FLONASE) 50 MCG/ACT nasal spray 1 spray by Each Nostril route daily      furosemide (LASIX) 40 MG tablet Take 40 mg by mouth daily      isosorbide mononitrate (IMDUR) 30 MG extended release tablet Take 30 mg by mouth daily      metoprolol succinate (TOPROL XL) 25 MG extended release tablet Take 25 mg by mouth daily      montelukast (SINGULAIR) 10 MG tablet Take 10 mg by mouth every morning       omeprazole (PRILOSEC) 40 MG delayed release capsule Take 40 mg by mouth daily       amitriptyline (ELAVIL) 25 MG tablet Take 25 mg by mouth nightly as needed for Sleep       Multiple Vitamins-Minerals (THERAPEUTIC MULTIVITAMIN-MINERALS) tablet Take 1 tablet by mouth daily      budesonide-formoterol (SYMBICORT) 160-4.5 MCG/ACT AERO Inhale 2 puffs into the lungs 2 times daily         Allergies:     Allergies   Allergen Reactions    Sulfa Antibiotics Hives and Itching       Problem List:    Patient Active Problem List   Diagnosis Code    Cellulitis and abscess of face L03.211, L02.01    Acute exacerbation of chronic obstructive pulmonary disease (COPD) [facility] J44.1    Coronary artery disease involving native coronary artery of native heart without angina pectoris I25.10    Benign essential HTN I10    Chronic bilateral low back pain without sciatica M54.5, G89.29    Generalized anxiety disorder F41.1    Acute respiratory failure (HCC) J96.00       Past Medical History:        Diagnosis Date    Acid reflux     Anxiety and depression     Asthma     CAD (coronary artery disease)     per medical record. Pt follows-up with Dr. Chantal Ramriez CHF (congestive heart failure) ([facility] [address]     per medical record    COPD (chronic obstructive pulmonary disease) ([facility] [address]     Hyperlipidemia     Hypertension     IBS (irritable bowel syndrome)     MI (myocardial infarction) ([facility] [address]     On home O2     at night per pt. 2.5 Liters    Osteoarthritis     Peptic ulcer     PNA (pneumonia) 2019       Past Surgical History:        Procedure Laterality Date    CARDIAC CATHETERIZATION  2017    per medical record. No stents placed per pt.     CERVICAL FUSION      COLONOSCOPY N/A 7/31/2020    COLONOSCOPY HOT SNARE BIOPSY performed by Pasco Schlatter, MD at [address] [facility] N/A 7/31/2020    EGD BIOPSY WITH SAVORY DILATION performed by Pasco Schlatter, MD at [address] [facility] N/A 6/24/2021    EGD BIOPSY performed by Cyndie Mckeon MD at [address] [facility] History:    Social History     Tobacco Use    Smoking status: Current Every Day Smoker     Packs/day: 0.25     Years: 40.00     Pack years: 10.00     Types: Cigarettes    Smokeless tobacco: Never Used    Tobacco comment: pt on chantix   Substance Use Topics    Alcohol use: No     Alcohol/week: 0.0 standard drinks                                Ready to quit: Not Answered  Counseling given: Not Answered  Comment: pt on chantix      Vital Signs (Current):   Vitals:    06/24/21 0630 06/24/21 0806 06/24/21 0815 06/24/21 0830   BP: 115/61 113/61 128/71 128/69   Pulse: 65 75 78 80 Resp: 18 25 15 18   Temp: 96.8 °F (36 °C) 97 °F (36.1 °C)  97.3 °F (36.3 °C)   TempSrc: Temporal Temporal  Temporal   SpO2: 96% 93% 93% 94%   Weight:       Height:                                                  BP Readings from Last 3 Encounters:   06/24/21 128/69   09/18/20 102/63   07/31/20 (!) 96/50       NPO Status: Time of last liquid consumption: 2300                        Time of last solid consumption: 1900                        Date of last liquid consumption: 06/23/21                        Date of last solid food consumption: 06/23/21    BMI:   Wt Readings from Last 3 Encounters:   06/24/21 174 lb (78.9 kg)   09/18/20 188 lb (85.3 kg)   07/31/20 188 lb (85.3 kg)     Body mass index is 30.82 kg/m². CBC:   Lab Results   Component Value Date    WBC 10.4 04/23/2021    RBC 4.58 04/23/2021    HGB 15.7 04/23/2021    HCT 48.0 04/23/2021    .8 04/23/2021    RDW 13.6 04/23/2021     04/23/2021       CMP:   Lab Results   Component Value Date     06/01/2019    K 2.3 04/23/2021     06/01/2019    CO2 27 06/01/2019    BUN 16 06/01/2019    CREATININE 0.66 06/01/2019    GFRAA >60 06/01/2019    LABGLOM >60 06/01/2019    GLUCOSE 133 06/01/2019    PROT 7.7 04/23/2021    CALCIUM 9.4 06/01/2019    BILITOT 0.49 04/23/2021    ALKPHOS 86 04/23/2021    AST 14 04/23/2021    ALT 12 04/23/2021       POC Tests: No results for input(s): POCGLU, POCNA, POCK, POCCL, POCBUN, POCHEMO, POCHCT in the last 72 hours.     Coags: No results found for: PROTIME, INR, APTT    HCG (If Applicable): No results found for: PREGTESTUR, PREGSERUM, HCG, HCGQUANT     ABGs: No results found for: PHART, PO2ART, MNL6QIV, XLJ1PHW, BEART, I5PPASHA     Type & Screen (If Applicable):  No results found for: LABABO, LABRH    Drug/Infectious Status (If Applicable):  No results found for: HIV, HEPCAB    COVID-19 Screening (If Applicable):   Lab Results   Component Value Date    COVID19 Not Detected 07/27/2020           Anesthesia Evaluation  Patient summary reviewed and Nursing notes reviewed no history of anesthetic complications:   Airway: Mallampati: II  TM distance: >3 FB   Neck ROM: full  Mouth opening: > = 3 FB Dental: normal exam         Pulmonary:normal exam    (+) COPD:  asthma:                            Cardiovascular:  Exercise tolerance: no interval change,   (+) hypertension:, past MI:, CAD:, CHF:,           Rate: normal                    Neuro/Psych:   (+) psychiatric history:            GI/Hepatic/Renal:   (+) GERD:,           Endo/Other:                     Abdominal:           Vascular:                                        Anesthesia Plan      MAC and general     ASA 3       Induction: intravenous. Anesthetic plan and risks discussed with patient. Plan discussed with CRNA.     Attending anesthesiologist reviewed and agrees with Pre Eval content              Flako Cabral DO   6/24/2021

## 2021-06-24 NOTE — OP NOTE
Operative Note      Patient: Clovis Beltran  YOB: 1950  MRN: [id number]    Date of Procedure: 6/24/2021    Pre-Op Diagnosis: DX DYSPHAGIA    Indication for the procedure: Patient is a pleasant 72years old female who has been seen with dysphagia mainly to solids. She is scheduled for EGD for further evaluation. She had history of esophageal dilatation before. Post-Op Diagnosis: Mild gastritis, irregular Z-line. No evidence of stricture within the esophagus. Biopsies were taken to rule out eosinophilic esophagitis. Procedure(s):  EGD BIOPSY    Surgeon(s): eBcca Ndiaye MD    Assistant:   * No surgical staff found *    Informed consent: Risks, benefits, and alternatives of the procedure were explained to the patient prior to the procedure. Risks include but not limited to bleeding, perforation, aspiration, allergic reaction to medication or death. Patient was also informed about the risk of missing a lesion. Anesthesia: Monitor Anesthesia Care    Estimated Blood Loss (mL): Minimal    Complications: None    Specimens:   ID Type Source Tests Collected by Time Destination   A : DUODENUM BX Tissue Duodenum SURGICAL PATHOLOGY Becca Ndiaye MD 6/24/2021 0753    B : STOMACH BX Tissue Stomach SURGICAL PATHOLOGY Becca Ndiaye MD 6/24/2021 0754    C : DISTAL ESOPHAGUS Tissue Esophagus SURGICAL PATHOLOGY Becca Ndiaye MD 6/24/2021 4523    D : PROXIMAL ESOPHAGUS Tissue Esophagus SURGICAL PATHOLOGY Becca Ndiaye MD 6/24/2021 5419        Implants:  * No implants in log *      Drains: * No LDAs found *    Findings: 1-mild patchy hyperemia of the antrum and gastric body. Biopsies were taken to rule out H. Pylori. 2-irregular Z-line. Biopsies were taken to rule out Dan's esophagus. 3-no evidence of stricture within the esophagus. Biopsies were taken to rule out eosinophilic esophagitis. Detailed Description of Procedure:   Patient was placed in the left lateral decubitus position.   The well-lubricated Olympus EGD scope was passed through the bite-block was advanced into the esophagus. Esophageal mucosa of the upper middle esophagus appeared normal.  Biopsies were taken to rule out eosinophilic esophagitis. The scope was then advanced into the distal esophagus. The Z-line was seen. It was irregular. Biopsies were taken to rule out Dan's esophagus. There was no evidence of stricture within the esophagus. The scope was then advanced into the stomach. There was mild patchy hyperemia of the antrum and gastric body. Biopsies were taken from the antrum and gastric body to rule out H. pylori. Retroflexion was done. The cardia and fundus of stomach were seen and appeared normal.  The scope was then brought back into forward view. Pylorus was intubated. Duodenal bulb and second portion of duodenum were seen and appeared normal.  Biopsies were taken to rule out celiac disease. The scope was then withdrawn outside the patient. Plan: 1. Follow up on results of pathology  2. Follow-up in the office in 1 to 2 weeks. 3.  If biopsies are negative will proceed with esophageal motility study.     Electronically signed by Doyle Ellsworth MD on 6/24/2021 at 8:04 AM

## 2021-06-24 NOTE — H&P
Interval H&P Note    Pt Name: Huong Lilly  MRN: [id number]  YOB: 1950  Date of evaluation: 6/24/2021      [x] I have reviewed the hardcopy Progress Note by Dr Maryla Closs dated 6/9/21 in short chart for an Interval History and Physical note. [x] I have examined  Huong Lilly  There are no changes to the patient who is scheduled for a EGD with dilation by Dr Nicole Goltz for dysphagia. Hx Asthma/COPD uses O2 @ 2-3L/nc prn at night She does experience some SOB with exertion. Patient took Singulair and used Proventil nebulizer, and Spiriva yesterday. Hx CAD and follows with Dr Janene Banuelos   Since her last physician visit she denies new health changes, fever, chills, wheezing, cough, increased SOB, chest pain, open sores or wounds. No DM or use of blood thinning medication. Past Medical History:     Past Medical History:   Diagnosis Date    Acid reflux     Anxiety and depression     Asthma     CAD (coronary artery disease)     per medical record. Pt follows-up with Dr. Nathaniel Rodriguez CHF (congestive heart failure) ([facility]     per medical record    COPD (chronic obstructive pulmonary disease) ([facility]     Hyperlipidemia     Hypertension     IBS (irritable bowel syndrome)     MI (myocardial infarction) ([facility]     On home O2     at night per pt. 2.5 Liters    Osteoarthritis     Peptic ulcer     PNA (pneumonia) 2019        Past Surgical History:     Past Surgical History:   Procedure Laterality Date    CARDIAC CATHETERIZATION  2017    per medical record. No stents placed per pt.     CERVICAL FUSION      COLONOSCOPY N/A 7/31/2020    [facility] [facility] [place] N/A 7/31/2020    EGD BIOPSY WITH SAVORY DILATION performed by Sasha Yepez MD at [address] History:     Social History     Socioeconomic History    Marital status: Single     Spouse name: None    Number of children: None    Years of education: None    Highest education level: None   Occupational History    None   Tobacco Use    Smoking status: Current Every Day Smoker     Packs/day: 0.25     Years: 40.00     Pack years: 10.00     Types: Cigarettes    Smokeless tobacco: Never Used    Tobacco comment: pt on chantix   Vaping Use    Vaping Use: Never used   Substance and Sexual Activity    Alcohol use: No     Alcohol/week: 0.0 standard drinks    Drug use: No    Sexual activity: None   Other Topics Concern    None   Social History Narrative    None     Social Determinants of Health     Financial Resource Strain:     Difficulty of Paying Living Expenses:    Food Insecurity:     Worried About Running Out of Food in the Last Year:     Ran Out of Food in the Last Year:    Transportation Needs:     Lack of Transportation (Medical):  Lack of Transportation (Non-Medical):    Physical Activity:     Days of Exercise per Week:     Minutes of Exercise per Session:    Stress:     Feeling of Stress :    Social Connections:     Frequency of Communication with Friends and Family:     Frequency of Social Gatherings with Friends and Family:     Attends Jew Services:     Active Member of Clubs or Organizations:     Attends Club or Organization Meetings:     Marital Status:    Intimate Partner Violence:     Fear of Current or Ex-Partner:     Emotionally Abused:     Physically Abused:     Sexually Abused:        Family History:     History reviewed. No pertinent family history. Medication History:     No current facility-administered medications on file prior to encounter.      Current Outpatient Medications on File Prior to Encounter   Medication Sig Dispense Refill    predniSONE (DELTASONE) 10 MG tablet 3 TABS PO DAILY FOR 3 DAYS, THEN 2 TABS PO DAILY FOR 3 DAYS ,THEN 1 TAB PO DAILY FOR 3 DAYS      NONFORMULARY Pt uses O2 3L NC prn      metOLazone (ZAROXOLYN) 2.5 MG tablet       cyclobenzaprine (FLEXERIL) 5 MG tablet TAKE 1 TABLET BY MOUTH THREE TIMES A DAY AS NEEDED      albuterol (PROVENTIL) (2.5 MG/3ML) 0.083% nebulizer solution Take 3 mLs by nebulization every 6 hours as needed for Wheezing 120 each 0    gabapentin (NEURONTIN) 600 MG tablet Take 1 tablet by mouth 3 times daily for 30 days. 90 tablet 0    docusate sodium (COLACE) 100 MG capsule Take 1 capsule by mouth 2 times daily as needed for Constipation 60 capsule 0    varenicline (CHANTIX) 0.5 MG tablet Take 1-2 tablets by mouth See Admin Instructions 0.5mg DAILY for 3 days followed by 0.5mg TWICE DAILY for 4 days followed by 1mg TWICE DAILY 57 tablet 0    atorvastatin (LIPITOR) 40 MG tablet Take 40 mg by mouth daily      diclofenac sodium 1 % GEL Apply 4 g topically 3 times daily To knees, hips, spine, neck, arms      sertraline (ZOLOFT) 25 MG tablet Take 25 mg by mouth daily      tiotropium (SPIRIVA RESPIMAT) 2.5 MCG/ACT AERS inhaler Inhale 2 puffs into the lungs daily      oxyCODONE-acetaminophen (PERCOCET) 5-325 MG per tablet Take 1 tablet by mouth every 8 hours as needed for Pain.       busPIRone (BUSPAR) 7.5 MG tablet Take 7.5 mg by mouth 2 times daily       fluticasone (FLONASE) 50 MCG/ACT nasal spray 1 spray by Each Nostril route daily      furosemide (LASIX) 40 MG tablet Take 40 mg by mouth daily      isosorbide mononitrate (IMDUR) 30 MG extended release tablet Take 30 mg by mouth daily      metoprolol succinate (TOPROL XL) 25 MG extended release tablet Take 25 mg by mouth daily      montelukast (SINGULAIR) 10 MG tablet Take 10 mg by mouth every morning       omeprazole (PRILOSEC) 40 MG delayed release capsule Take 40 mg by mouth daily       amitriptyline (ELAVIL) 25 MG tablet Take 25 mg by mouth nightly as needed for Sleep       Multiple Vitamins-Minerals (THERAPEUTIC MULTIVITAMIN-MINERALS) tablet Take 1 tablet by mouth daily      budesonide-formoterol (SYMBICORT) 160-4.5 MCG/ACT AERO Inhale 2 puffs into the lungs 2 times daily         Vital signs: /61   Pulse 65   Temp 96.8 °F (36 °C) (Temporal)   Resp 18   Ht 5' 3\" (1.6 m)   Wt 174 lb (78.9 kg)   SpO2 96%   BMI 30.82 kg/m²     Allergies:  Sulfa antibiotics    This is a 72 y.o.obese female who is pleasant, cooperative, alert and oriented x3, in no acute distress. Heart: Heart sounds are normal.  HR 65 regular rate and rhythm without murmur, gallop or rub. Lungs: Normal respiratory effort with equal expansion, good air exchange, unlabored and clear to auscultation without wheezes or rales bilaterally   Abdomen: round, soft, nontender, nondistended with bowel sounds . Labs:  No results for input(s): HGB, HCT, WBC, MCV, PLT, NA, K, CL, CO2, BUN, CREATININE, GLUCOSE, INR, PROTIME, APTT, AST, ALT, LABALBU, HCG in the last 720 hours. No results for input(s): COVID19 in the last 720 hours.     Shahla Johnston, APRN - CNP  APRN, ANP-BC  Electronically signed 6/24/2021 at 7:04 AM

## 2021-06-26 LAB — SURGICAL PATHOLOGY REPORT: NORMAL

## 2022-03-03 ENCOUNTER — TELEPHONE (OUTPATIENT)
Dept: RADIATION ONCOLOGY | Facility: MEDICAL CENTER | Age: 72
End: 2022-03-03

## 2022-03-03 NOTE — TELEPHONE ENCOUNTER
Received consult request from Dr Kelsea Fuller. Tried to call patient to schedule consult. No answer, had to leave a message.

## 2022-03-10 ENCOUNTER — HOSPITAL ENCOUNTER (OUTPATIENT)
Dept: RADIATION ONCOLOGY | Facility: MEDICAL CENTER | Age: 72
Discharge: HOME | End: 2022-03-10
Attending: STUDENT IN AN ORGANIZED HEALTH CARE EDUCATION/TRAINING PROGRAM
Payer: MEDICARE

## 2022-03-10 ENCOUNTER — HOSPITAL ENCOUNTER (OUTPATIENT)
Dept: RADIATION ONCOLOGY | Facility: MEDICAL CENTER | Age: 72
Discharge: HOME | End: 2022-03-10
Payer: MEDICARE

## 2022-03-10 VITALS
BODY MASS INDEX: 30.15 KG/M2 | WEIGHT: 170.2 LBS | SYSTOLIC BLOOD PRESSURE: 105 MMHG | HEART RATE: 88 BPM | DIASTOLIC BLOOD PRESSURE: 61 MMHG | OXYGEN SATURATION: 97 % | TEMPERATURE: 96.8 F

## 2022-03-10 PROCEDURE — 99213 OFFICE O/P EST LOW 20 MIN: CPT | Performed by: STUDENT IN AN ORGANIZED HEALTH CARE EDUCATION/TRAINING PROGRAM

## 2022-03-10 PROCEDURE — 99205 OFFICE O/P NEW HI 60 MIN: CPT | Performed by: RADIOLOGY

## 2022-03-10 PROCEDURE — 77334 RADIATION TREATMENT AID(S): CPT | Performed by: STUDENT IN AN ORGANIZED HEALTH CARE EDUCATION/TRAINING PROGRAM

## 2022-03-10 PROCEDURE — 77263 THER RADIOLOGY TX PLNG CPLX: CPT | Performed by: STUDENT IN AN ORGANIZED HEALTH CARE EDUCATION/TRAINING PROGRAM

## 2022-03-10 PROCEDURE — 77332 RADIATION TREATMENT AID(S): CPT | Performed by: STUDENT IN AN ORGANIZED HEALTH CARE EDUCATION/TRAINING PROGRAM

## 2022-03-10 NOTE — CONSULTS
positive. She underwent lumpectomy and sentinel lymph node biopsy on 2/8/2022. Pathology revealed invasive ductal carcinoma, grade 2, DCIS was present but not extensive, no LVI or LCIS, and margins were negative. 2 sentinel lymph nodes were taken did not have cancer in them. Her final surgical pathology was pT1bN0(sn). She has healed well from surgery not had any new problems or complaints. She did not suffer any major postoperative complications. She is not having swelling of either upper extremity. She denies any problems with upper extremity motion. She has not had radiation therapy before. She does not have any cardiac implanted devices. She saw surgery back on 2/28/2022 and was doing well at that time. They referred her to radiation oncology and medical oncology to discuss adjuvant options. PRIOR RADIATION HISTORY:  No prior history of radiation therapy    PACEMAKER: None    PAST MEDICAL HISTORY:  Past Medical History:   Diagnosis Date    Acid reflux     Anxiety and depression     Asthma     CAD (coronary artery disease)     per medical record. Pt follows-up with Dr. Elijah Hopkins CHF (congestive heart failure) ([facility] [address] [address]     per medical record    COPD (chronic obstructive pulmonary disease) ([facility] [address] [facility]     Hyperlipidemia     Hypertension     IBS (irritable bowel syndrome)     MI (myocardial infarction) ([facility] [address] [facility]     On home O2     at night per pt. 2.5 Liters    Osteoarthritis     Peptic ulcer     PNA (pneumonia) 2019       PAST SURGICAL HISTORY:  Past Surgical History:   Procedure Laterality Date    CARDIAC CATHETERIZATION  2017    per medical record. No stents placed per pt.     CERVICAL FUSION      COLONOSCOPY N/A 7/31/2020    COLONOSCOPY HOT SNARE BIOPSY performed by Yeyo Owens MD at [address] N/A 7/31/2020    EGD BIOPSY WITH SAVORY DILATION performed by Yeyo Owens MD at [address] 6/24/2021    EGD BIOPSY performed by Fredrik Brittle, MD at [facility]:    Current Outpatient Medications:     predniSONE (DELTASONE) 10 MG tablet, 3 TABS PO DAILY FOR 3 DAYS, THEN 2 TABS PO DAILY FOR 3 DAYS ,THEN 1 TAB PO DAILY FOR 3 DAYS, Disp: , Rfl:     NONFORMULARY, Pt uses O2 3L NC prn, Disp: , Rfl:     metOLazone (ZAROXOLYN) 2.5 MG tablet, , Disp: , Rfl:     cyclobenzaprine (FLEXERIL) 5 MG tablet, TAKE 1 TABLET BY MOUTH THREE TIMES A DAY AS NEEDED, Disp: , Rfl:     albuterol (PROVENTIL) (2.5 MG/3ML) 0.083% nebulizer solution, Take 3 mLs by nebulization every 6 hours as needed for Wheezing, Disp: 120 each, Rfl: 0    gabapentin (NEURONTIN) 600 MG tablet, Take 1 tablet by mouth 3 times daily for 30 days. , Disp: 90 tablet, Rfl: 0    docusate sodium (COLACE) 100 MG capsule, Take 1 capsule by mouth 2 times daily as needed for Constipation, Disp: 60 capsule, Rfl: 0    varenicline (CHANTIX) 0.5 MG tablet, Take 1-2 tablets by mouth See Admin Instructions 0.5mg DAILY for 3 days followed by 0.5mg TWICE DAILY for 4 days followed by 1mg TWICE DAILY, Disp: 57 tablet, Rfl: 0    atorvastatin (LIPITOR) 40 MG tablet, Take 40 mg by mouth daily, Disp: , Rfl:     diclofenac sodium 1 % GEL, Apply 4 g topically 3 times daily To knees, hips, spine, neck, arms, Disp: , Rfl:     sertraline (ZOLOFT) 25 MG tablet, Take 25 mg by mouth daily, Disp: , Rfl:     tiotropium (SPIRIVA RESPIMAT) 2.5 MCG/ACT AERS inhaler, Inhale 2 puffs into the lungs daily, Disp: , Rfl:     oxyCODONE-acetaminophen (PERCOCET) 5-325 MG per tablet, Take 1 tablet by mouth every 8 hours as needed for Pain., Disp: , Rfl:     busPIRone (BUSPAR) 7.5 MG tablet, Take 7.5 mg by mouth 2 times daily , Disp: , Rfl:     fluticasone (FLONASE) 50 MCG/ACT nasal spray, 1 spray by Each Nostril route daily, Disp: , Rfl:     furosemide (LASIX) 40 MG tablet, Take 40 mg by mouth daily, Disp: , Rfl:     isosorbide mononitrate (IMDUR) 30 MG extended release tablet, Take 30 mg by mouth daily, Disp: , Rfl:     metoprolol succinate (TOPROL XL) 25 MG extended release tablet, Take 25 mg by mouth daily, Disp: , Rfl:     montelukast (SINGULAIR) 10 MG tablet, Take 10 mg by mouth every morning , Disp: , Rfl:     omeprazole (PRILOSEC) 40 MG delayed release capsule, Take 40 mg by mouth daily , Disp: , Rfl:     budesonide-formoterol (SYMBICORT) 160-4.5 MCG/ACT AERO, Inhale 2 puffs into the lungs 2 times daily, Disp: , Rfl:     amitriptyline (ELAVIL) 25 MG tablet, Take 25 mg by mouth nightly as needed for Sleep , Disp: , Rfl:     Multiple Vitamins-Minerals (THERAPEUTIC MULTIVITAMIN-MINERALS) tablet, Take 1 tablet by mouth daily, Disp: , Rfl:     ALLERGIES:   Allergies   Allergen Reactions    Sulfa Antibiotics Hives and Itching       SOCIAL HISTORY:  Social History     Socioeconomic History    Marital status: Single     Spouse name: Not on file    Number of children: Not on file    Years of education: Not on file    Highest education level: Not on file   Occupational History    Not on file   Tobacco Use    Smoking status: Current Every Day Smoker     Packs/day: 0.25     Years: 40.00     Pack years: 10.00     Types: Cigarettes    Smokeless tobacco: Never Used    Tobacco comment: pt on chantix   Vaping Use    Vaping Use: Never used   Substance and Sexual Activity    Alcohol use: No     Alcohol/week: 0.0 standard drinks    Drug use: No    Sexual activity: Not on file   Other Topics Concern    Not on file   Social History Narrative    Not on file     Social Determinants of Health     Financial Resource Strain:     Difficulty of Paying Living Expenses: Not on file   Food Insecurity:     Worried About Running Out of Food in the Last Year: Not on file    Juan of Food in the Last Year: Not on file   Transportation Needs:     Lack of Transportation (Medical): Not on file    Lack of Transportation (Non-Medical):  Not on file   Physical Activity:     Days of Exercise per Week: Not on file    Minutes of Exercise per Session: Not on file   Stress:     Feeling of Stress : Not on file   Social Connections:     Frequency of Communication with Friends and Family: Not on file    Frequency of Social Gatherings with Friends and Family: Not on file    Attends Zoroastrian Services: Not on file    Active Member of [address] [place] [address] [employer] or Organizations: Not on file    Attends Club or Organization Meetings: Not on file    Marital Status: Not on file   Intimate Partner Violence:     Fear of Current or Ex-Partner: Not on file    Emotionally Abused: Not on file    Physically Abused: Not on file    Sexually Abused: Not on file   Housing Stability:     Unable to Pay for Housing in the Last Year: Not on file    Number of [place] in the Last Year: Not on file    Unstable Housing in the Last Year: Not on file       FAMILY HISTORY:  No family history on file. REVIEW OF SYSTEMS:    GENERAL/CONSTITUTIONAL: The patient denies fever, fatigue, weakness, weight gain or weight loss. HEENT: The patient denies pain, redness, loss of vision, double or blurred vision. The patient denies ringing in the ears, loss of hearing, hoarseness, trouble swallowing, or painful swallowing. CARDIOVASCULAR: The patient denies chest pain, irregular heartbeats, sudden changes in heartbeat or palpitation. RESPIRATORY: The patient denies shortness of breath, cough, hemoptysis. GASTROINTESTINAL: The patient denies nausea, vomiting, diarrhea, constipation, blood in the stools. GENITOURINARY: The patient denies difficult urination, pain or burning with urination, blood in the urine. MUSCULOSKELETAL: The patient denies arm, buttock, thigh or calf cramps. No joint or muscle pain. SKIN: The patient denies easy bruising, skin redness, skin rash, hives. NEUROLOGIC: The patient denies headache, dizziness, fainting, muscle spasm, loss of consciousness.   ENDOCRINE: The patient denies intolerance to hot or cold temperature, flushing. HEMATOLOGIC/LYMPHATIC: The patient denies anemia, bleeding tendency or clotting tendency. ALLERGIC/IMMUNOLOGIC: The patient denies rhinitis, asthma, skin sensitivity. PYSCHOLOGIC: The patient denies any depression, anxiety, or confusion. A full 14 point review of systems was performed and assessed and found to be negative except as noted above. PHYSICAL EXAMINATION:    CHAPERONE: Not Required    ECO Asymptomatic    VITAL SIGNS: /61   Pulse 88   Temp 96.8 °F (36 °C) (Temporal)   Wt 170 lb 3.2 oz (77.2 kg)   SpO2 97%   BMI 30.15 kg/m²   GENERAL:  General appearance is that of a well-nourished, well-developed in no apparent distress. HEENT: Normocephalic, atraumatic, EOMI, moist mucosa, no erythema. Indirect exam .  NECK:  No adenopathy or a palpable thyroid mass, trachea is midline. LYMPHATICS: No cervical, supraclavicular, or axillary adenopathy. HEART:  Normal rate and regular rhythm, normal heart sounds. LUNGS:  Pulmonary effort normal. Normal breath sounds. ABDOMEN:  Soft, nontender, non distended, and no hepatosplenomegaly. EXTREMITIES:  No edema. No calf tenderness. MSK:  No spinal tenderness. Normal ROM. NEUROLOGICAL: Alert and oriented. Strength and sensation intact bilaterally. No focal deficits. PSYCH: Mood normal, behavior normal.  SKIN: No erythema, no desquamation. BREAST: Deferred, will examine at time of CT simulation. LABS:  WBC   Date Value Ref Range Status   2021 10.4 3.5 - 11.3 k/uL Final     Segs Absolute   Date Value Ref Range Status   2021 6.24 1.50 - 8.10 k/uL Final     Hemoglobin   Date Value Ref Range Status   2021 15.7 (H) 11.9 - 15.1 g/dL Final     Platelets   Date Value Ref Range Status   2021 492 (H) 138 - 453 k/uL Final     No results found for: , CEA  No results found for:   No results found for: PSA      IMAGING:   Mammogram and ultrasound reports reviewed and noted above. PATHOLOGY:  Biopsy and surgical pathology reports reviewed and noted above. ASSESSMENT AND PLAN:   Raeann Ferraro is a 80 y.o. 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 As she had lumpectomy, I recommend adjuvant radiation therapy. I think she would be a good candidate for accelerated partial breast irradiation (APBI) because of the size of the tumor, her age, and overall good prognostic factors. The indications, risk, and benefits were discussed at length and after all questions were answered she expressed understanding of her diagnosis, prognosis, my recommendations. She would like to move forward with treatment. The prescription will be 30Gy/5fx. However, if the cavity is in a location where we cannot meet certain constraints (ie rib, skin, lung, etc) then we would have to do whole breast irradiation which would be 15 fractions instead of 5. She understands this and knows we will not know the final treatment/prescription until we take a look at her cavity on our planning CT. If we are unable to do APBI then we will call her and discuss the option of whole breast treatments. After ample time to review the patient's questions, an informed consent was obtained to proceed with scheduling a treatment planning session for radiation therapy. Patient was in agreement with my recommendations. All questions were answered to their satisfaction. Patient was advised to contact us anytime should they have any questions or concerns. I spent greater than 60 minutes counseling and evaluating the different treatment options available to the patient and formulating a plan of action today.       Catherine Montoya MD MD  Electronically signed by Catherine Montoya MD on 3/10/22 at 1:02 PM EST      Drugs Prescribed:  New Prescriptions    No medications on file       Orders Placed:   No orders of the defined types were placed in this encounter.         CC:  Patient Care Team:  Heena Fernandez MD as PCP - General (Family Medicine)  Arjun Brewer MD as Surgeon (General Surgery)  Gloria Luna MD as Consulting Physician (Radiation Oncology)

## 2022-03-10 NOTE — PROGRESS NOTES
Referring Physician: Dr. Kayden Mccain:    03/10/22 1308   BP: 105/61   Pulse: 88   Temp: 96.8 °F (36 °C)   SpO2: 97%    :  Patient Currently in Pain: No             Wt Readings from Last 1 Encounters:   03/10/22 170 lb 3.2 oz (77.2 kg)        Body mass index is 30.15 kg/m². Immunizations:    Influenza status:    []   Current   [x]   Patient declined    Pneumococcal status:  [x]   Current  []   Patient declined  Covid status:   [x]  Dose #1:J&J                   []  Dose #2:     [x]  Booster:               []  Patient declined    Smoking Status:    [x] Smoker - PPD:0.25   [] Nonsmoker - Quit Date:               [] Never a smoker      No chief complaint on file. Cancer Staging  No matching staging information was found for the patient. Prior Radiation Therapy? No   If yes, site treated:   Facility:                             Date:    Concurrent Chemo/radiation? No   If yes, start date:    Prior Chemotherapy? No   If yes    Facility:                             Date:    Prior Hormonal Therapy or Contraceptive Medications? No   If yes,  Medication:                                Duration:    Head and Neck Cancer? No   If yes, please remind physician to place swallow study order and speech therapy order. Pacemaker/Defibulator/ICD:  No    Do you have any musculoskeletal diseases, Lupus or arthritic conditions? No   If yes, are you currently taking Methotrexate?   []  Yes  []  No           BREAST/GYN  Patient only:     LMP:    Age at first Menses:    Para:    :    Cup size:        PROSTATE patient only :    Oral hormone replacement therapy []  Yes  []  No            Current Outpatient Medications   Medication Sig Dispense Refill    predniSONE (DELTASONE) 10 MG tablet 3 TABS PO DAILY FOR 3 DAYS, THEN 2 TABS PO DAILY FOR 3 DAYS ,THEN 1 TAB PO DAILY FOR 3 DAYS      NONFORMULARY Pt uses O2 3L NC prn      metOLazone (ZAROXOLYN) 2.5 MG tablet       cyclobenzaprine (FLEXERIL) 5 MG tablet TAKE 1 TABLET BY MOUTH THREE TIMES A DAY AS NEEDED      albuterol (PROVENTIL) (2.5 MG/3ML) 0.083% nebulizer solution Take 3 mLs by nebulization every 6 hours as needed for Wheezing 120 each 0    gabapentin (NEURONTIN) 600 MG tablet Take 1 tablet by mouth 3 times daily for 30 days. 90 tablet 0    docusate sodium (COLACE) 100 MG capsule Take 1 capsule by mouth 2 times daily as needed for Constipation 60 capsule 0    varenicline (CHANTIX) 0.5 MG tablet Take 1-2 tablets by mouth See Admin Instructions 0.5mg DAILY for 3 days followed by 0.5mg TWICE DAILY for 4 days followed by 1mg TWICE DAILY 57 tablet 0    atorvastatin (LIPITOR) 40 MG tablet Take 40 mg by mouth daily      diclofenac sodium 1 % GEL Apply 4 g topically 3 times daily To knees, hips, spine, neck, arms      sertraline (ZOLOFT) 25 MG tablet Take 25 mg by mouth daily      tiotropium (SPIRIVA RESPIMAT) 2.5 MCG/ACT AERS inhaler Inhale 2 puffs into the lungs daily      oxyCODONE-acetaminophen (PERCOCET) 5-325 MG per tablet Take 1 tablet by mouth every 8 hours as needed for Pain.       busPIRone (BUSPAR) 7.5 MG tablet Take 7.5 mg by mouth 2 times daily       fluticasone (FLONASE) 50 MCG/ACT nasal spray 1 spray by Each Nostril route daily      furosemide (LASIX) 40 MG tablet Take 40 mg by mouth daily      isosorbide mononitrate (IMDUR) 30 MG extended release tablet Take 30 mg by mouth daily      metoprolol succinate (TOPROL XL) 25 MG extended release tablet Take 25 mg by mouth daily      montelukast (SINGULAIR) 10 MG tablet Take 10 mg by mouth every morning       omeprazole (PRILOSEC) 40 MG delayed release capsule Take 40 mg by mouth daily       budesonide-formoterol (SYMBICORT) 160-4.5 MCG/ACT AERO Inhale 2 puffs into the lungs 2 times daily      amitriptyline (ELAVIL) 25 MG tablet Take 25 mg by mouth nightly as needed for Sleep       Multiple Vitamins-Minerals (THERAPEUTIC MULTIVITAMIN-MINERALS) tablet Take 1 tablet by mouth daily       No current facility-administered medications for this encounter. Past Medical History:   Diagnosis Date    Acid reflux     Anxiety and depression     Asthma     CAD (coronary artery disease)     per medical record. Pt follows-up with Dr. Aline Hong    [facility]     per medical record    COPD (chronic obstructive pulmonary disease) [facility]     Hyperlipidemia     Hypertension     IBS (irritable bowel syndrome)     MI (myocardial infarction) [facility]     On home O2     at night per pt. 2.5 Liters    Osteoarthritis     Peptic ulcer     PNA (pneumonia) 2019       Past Surgical History:   Procedure Laterality Date    CARDIAC CATHETERIZATION  2017    per medical record. No stents placed per pt. CERVICAL FUSION      COLONOSCOPY N/A 7/31/2020    COLONOSCOPY HOT SNARE BIOPSY performed by Rony Ma MD at [facility] 7/31/2020    EGD BIOPSY WITH SAVORY DILATION performed by Rony Ma MD at [facility] N/A 6/24/2021    EGD BIOPSY performed by Berry Mitchell MD at [facility]       No family history on file.     Social History     Socioeconomic History    Marital status: Single     Spouse name: Not on file    Number of children: Not on file    Years of education: Not on file    Highest education level: Not on file   Occupational History    Not on file   Tobacco Use    Smoking status: Current Every Day Smoker     Packs/day: 0.25     Years: 40.00     Pack years: 10.00     Types: Cigarettes    Smokeless tobacco: Never Used    Tobacco comment: pt on chantix   Vaping Use    Vaping Use: Never used   Substance and Sexual Activity    Alcohol use: No     Alcohol/week: 0.0 standard drinks    Drug use: No    Sexual activity: Not on file   Other Topics Concern    Not on file   Social History Narrative    Not on file     Social Determinants of Health     Financial Resource Strain:     Difficulty of Paying Living Expenses: Not on file Food Insecurity:     Worried About [address] in the Last Year: Not on file    Juan of Food in the Last Year: Not on file   Transportation Needs:     Lack of Transportation (Medical): Not on file    Lack of Transportation (Non-Medical): Not on file   Physical Activity:     Days of Exercise per Week: Not on file    Minutes of Exercise per Session: Not on file   Stress:     Feeling of Stress : Not on file   Social Connections:     Frequency of Communication with Friends and Family: Not on file    Frequency of Social Gatherings with Friends and Family: Not on file    Attends Sabianist Services: Not on file    Active Member of Clubs or Organizations: Not on file    Attends Club or Organization Meetings: Not on file    Marital Status: Not on file   Intimate Partner Violence:     Fear of Current or Ex-Partner: Not on file    Emotionally Abused: Not on file    Physically Abused: Not on file    Sexually Abused: Not on file   Housing Stability:     Unable to Pay for Housing in the Last Year: Not on file    Number of [place] in the Last Year: Not on file    Unstable Housing in the Last Year: Not on file             FALLS RISK SCREEN  Instructions:  Assess the patient and enter the appropriate indicators that are present for fall risk identification. Total the numbers entered and assign a fall risk score from Table 2.  Reassess patient at a minimum every 12 weeks or with status change. Assessment   Date  3/10/2022     1. Mental Ability: confusion/cognitively impaired 0     2. Elimination Issues: incontinence, frequency 0       3. Ambulatory: use of assistive devices (walker, cane, off-loading devices),        attached to equipment (IV pole, oxygen) 2     4. Sensory Limitations: dizziness, vertigo, impaired vision 0     5. Age less than 65        0     6. Age 72 or greater 1     7. Medication: diuretics, strong analgesics, hypnotics, sedatives,        antihypertensive agents 0   8.   Falls:  recent

## 2022-03-11 ENCOUNTER — TELEPHONE (OUTPATIENT)
Dept: ONCOLOGY | Age: 72
End: 2022-03-11

## 2022-03-11 NOTE — TELEPHONE ENCOUNTER
[facility] Nutrition Note    PG-SGA screening tool reviewed with score of 4. Pt reports early satiety, consuming less than normal amounts of normal food and feeling not my normal self, but able to be up and about with fairly normal activities. Full nutrition assessment for scores > 4.        ANA Holguin, RD, LD  Registered Dietitian   [facility]  [phone]

## 2022-03-16 ENCOUNTER — TELEPHONE (OUTPATIENT)
Dept: ONCOLOGY | Facility: CLINIC | Age: 72
End: 2022-03-16

## 2022-03-17 ENCOUNTER — TELEPHONE (OUTPATIENT)
Dept: ONCOLOGY | Age: 72
End: 2022-03-17

## 2022-03-17 NOTE — TELEPHONE ENCOUNTER
[facility]  Initial Nutrition Assessment    Vashti Denney is a 80 y.o.  female     Reason for Evaluation: PGSGA score 4    NUTRITION RECOMMENDATIONS / Ld Saeed / EVALUATION  1. Sent 2 complimentary cases of Jasmine Goodman 1.4 to pt's home to allow her to trial oral nutrition supplements  2. Encouraged small, frequent meals and snacks to aid in meeting estimated needs  2. Will monitor wts, labs, po intakes, s/s management, plan of care. Subjective/Current Data:  Called pt on listed phone number. Pt reports some c/o early satiety, appetite has been good. RD spoke with pt about consuming small, frequent meals and provided high calorie/high protein foods recommendations. Pt reports possible weight loss, but also variation in scales at Dr offices. Pt states most recent weight was 163lb. Pt states she is not concerned about her weight status at the moment. RD encouraged pt to strive for weight maintenance. Pt states she has no teeth and has a history of esophageal dilation with occasional swallowing difficulty. Pt with several questions about who her physicians are, what her appointments are for, and other areas of care coordination. Past Medical History:  Past Medical History:   Diagnosis Date    Acid reflux     Anxiety and depression     Asthma     CAD (coronary artery disease)     per medical record. Pt follows-up with Dr. Marietta Olvera CHF (congestive heart failure) [facility]     per medical record    COPD [facility]chronic obstructive pulmonary disease) [facility]     Hyperlipidemia     Hypertension     IBS (irritable bowel syndrome)     MI (myocardial infarction) [facility]     On home O2     at night per pt. 2.5 Liters    Osteoarthritis     Peptic ulcer     PNA (pneumonia) 2019     Past Surgical History:   Procedure Laterality Date    CARDIAC CATHETERIZATION  2017    per medical record. No stents placed per pt.     CERVICAL FUSION      COLONOSCOPY N/A 7/31/2020    COLONOSCOPY HOT SNARE BIOPSY performed by Toyin Martinez MD at [address] [facility] N/A 7/31/2020    EGD BIOPSY WITH SAVORY DILATION performed by [facility]in [facility] MD at [facility] N/A 6/24/2021    EGD BIOPSY performed by Jaky Mujica MD at [facility]        Anthropometric Measures:  Current Weight: 77.2kg  Ideal Body Weight: 53 kg  Ideal Body Weight %: 145%  BMI 30    Nutritional Requirements:  Estimated Energy Needs:  Weight Used: 77.2kg   Method Used: [address] Terraj[employer]  Estimated kcal Needs: 3732-8785 kcal per day  Protein Needs:  Weight used: 77.2 kg  1.0-1.2 g/kg = 77-92 g per day    Nutrition-focused Physical Findings   GI symptoms: negative  Skin:  Intact    Nutrition Diagnosis and Goal  Problem:  Increased estimated needs  Etiology/related to: catabolic illness  Symptoms/Signs/as evidenced by: breast ca with plans for radiation       Goal: Adequate intake to aide in wt maintenaince, signs and symptoms management  Progress towards goal: stable  Education Needs: none at present time     Malnutrition Assessment  · Patient is at risk for malnutrition based on a history of weight loss and current intake. Per AND/ASPEN guidelines, will monitor for additional RD, RN and MD documentation re weight status, nutritional intake, fluid accumulation, possible loss of body fat or muscle mass, or possible reduced  strength.     ANA Zavala, RD, LD  Registered Dietitian   [facility]  [phone]

## 2022-03-18 ENCOUNTER — HOSPITAL ENCOUNTER (OUTPATIENT)
Dept: RADIATION ONCOLOGY | Facility: MEDICAL CENTER | Age: 72
Discharge: HOME | End: 2022-03-18
Attending: STUDENT IN AN ORGANIZED HEALTH CARE EDUCATION/TRAINING PROGRAM
Payer: MEDICARE

## 2022-03-18 ENCOUNTER — HOSPITAL ENCOUNTER (OUTPATIENT)
Dept: RADIATION ONCOLOGY | Facility: MEDICAL CENTER | Age: 72
Discharge: HOME | End: 2022-03-18
Payer: MEDICARE

## 2022-03-18 VITALS
DIASTOLIC BLOOD PRESSURE: 63 MMHG | RESPIRATION RATE: 18 BRPM | BODY MASS INDEX: 28.91 KG/M2 | WEIGHT: 163.2 LBS | SYSTOLIC BLOOD PRESSURE: 109 MMHG | HEART RATE: 86 BPM | OXYGEN SATURATION: 94 % | TEMPERATURE: 98 F

## 2022-03-18 DIAGNOSIS — Z17.0 CARCINOMA OF UPPER-OUTER QUADRANT OF LEFT BREAST IN FEMALE, ESTROGEN RECEPTOR POSITIVE (HCC): ICD-10-CM

## 2022-03-18 DIAGNOSIS — C50.412 CARCINOMA OF UPPER-OUTER QUADRANT OF LEFT BREAST IN FEMALE, ESTROGEN RECEPTOR POSITIVE (HCC): ICD-10-CM

## 2022-03-18 PROCEDURE — 99212 OFFICE O/P EST SF 10 MIN: CPT | Performed by: STUDENT IN AN ORGANIZED HEALTH CARE EDUCATION/TRAINING PROGRAM

## 2022-03-18 PROCEDURE — 77338 DESIGN MLC DEVICE FOR IMRT: CPT | Performed by: STUDENT IN AN ORGANIZED HEALTH CARE EDUCATION/TRAINING PROGRAM

## 2022-03-18 PROCEDURE — 77336 RADIATION PHYSICS CONSULT: CPT | Performed by: STUDENT IN AN ORGANIZED HEALTH CARE EDUCATION/TRAINING PROGRAM

## 2022-03-18 PROCEDURE — 77300 RADIATION THERAPY DOSE PLAN: CPT | Performed by: STUDENT IN AN ORGANIZED HEALTH CARE EDUCATION/TRAINING PROGRAM

## 2022-03-18 PROCEDURE — 77301 RADIOTHERAPY DOSE PLAN IMRT: CPT | Performed by: STUDENT IN AN ORGANIZED HEALTH CARE EDUCATION/TRAINING PROGRAM

## 2022-03-18 NOTE — PROGRESS NOTES
Shanel Ny  3/18/2022  2:55 PM      Vitals:    03/18/22 1449   BP: 109/63   Pulse: 86   Resp: 18   Temp: 98 °F (36.7 °C)   SpO2: 94%    :  Patient Currently in Pain: No             Wt Readings from Last 1 Encounters:   03/18/22 163 lb 3.2 oz (74 kg)                Current Outpatient Medications:     predniSONE (DELTASONE) 10 MG tablet, 3 TABS PO DAILY FOR 3 DAYS, THEN 2 TABS PO DAILY FOR 3 DAYS ,THEN 1 TAB PO DAILY FOR 3 DAYS, Disp: , Rfl:     NONFORMULARY, Pt uses O2 3L NC prn, Disp: , Rfl:     metOLazone (ZAROXOLYN) 2.5 MG tablet, , Disp: , Rfl:     cyclobenzaprine (FLEXERIL) 5 MG tablet, TAKE 1 TABLET BY MOUTH THREE TIMES A DAY AS NEEDED, Disp: , Rfl:     albuterol (PROVENTIL) (2.5 MG/3ML) 0.083% nebulizer solution, Take 3 mLs by nebulization every 6 hours as needed for Wheezing, Disp: 120 each, Rfl: 0    gabapentin (NEURONTIN) 600 MG tablet, Take 1 tablet by mouth 3 times daily for 30 days. , Disp: 90 tablet, Rfl: 0    docusate sodium (COLACE) 100 MG capsule, Take 1 capsule by mouth 2 times daily as needed for Constipation, Disp: 60 capsule, Rfl: 0    varenicline (CHANTIX) 0.5 MG tablet, Take 1-2 tablets by mouth See Admin Instructions 0.5mg DAILY for 3 days followed by 0.5mg TWICE DAILY for 4 days followed by 1mg TWICE DAILY, Disp: 57 tablet, Rfl: 0    atorvastatin (LIPITOR) 40 MG tablet, Take 40 mg by mouth daily, Disp: , Rfl:     diclofenac sodium 1 % GEL, Apply 4 g topically 3 times daily To knees, hips, spine, neck, arms, Disp: , Rfl:     sertraline (ZOLOFT) 25 MG tablet, Take 25 mg by mouth daily, Disp: , Rfl:     tiotropium (SPIRIVA RESPIMAT) 2.5 MCG/ACT AERS inhaler, Inhale 2 puffs into the lungs daily, Disp: , Rfl:     oxyCODONE-acetaminophen (PERCOCET) 5-325 MG per tablet, Take 1 tablet by mouth every 8 hours as needed for Pain., Disp: , Rfl:     busPIRone (BUSPAR) 7.5 MG tablet, Take 7.5 mg by mouth 2 times daily , Disp: , Rfl:     fluticasone (FLONASE) 50 MCG/ACT nasal spray, 1 spray by Each Nostril route daily, Disp: , Rfl:     furosemide (LASIX) 40 MG tablet, Take 40 mg by mouth daily, Disp: , Rfl:     isosorbide mononitrate (IMDUR) 30 MG extended release tablet, Take 30 mg by mouth daily, Disp: , Rfl:     metoprolol succinate (TOPROL XL) 25 MG extended release tablet, Take 25 mg by mouth daily, Disp: , Rfl:     montelukast (SINGULAIR) 10 MG tablet, Take 10 mg by mouth every morning , Disp: , Rfl:     omeprazole (PRILOSEC) 40 MG delayed release capsule, Take 40 mg by mouth daily , Disp: , Rfl:     budesonide-formoterol (SYMBICORT) 160-4.5 MCG/ACT AERO, Inhale 2 puffs into the lungs 2 times daily, Disp: , Rfl:     amitriptyline (ELAVIL) 25 MG tablet, Take 25 mg by mouth nightly as needed for Sleep , Disp: , Rfl:     Multiple Vitamins-Minerals (THERAPEUTIC MULTIVITAMIN-MINERALS) tablet, Take 1 tablet by mouth daily, Disp: , Rfl:     Immunizations:    Influenza status:    []   Current   [x]   Patient declined    Pneumococcal status:  []   Current  [x]   Patient declined  Covid status:   [x]  Dose #1:                     [x]  Dose #2:               []   Patient declined    Smoking Status:    [x] Smoker - PPD:1PPD   [] Nonsmoker - Quit Date:               [] Never a smoker      Cancer Screening:  Colonoscopy   [] Current       [] Not current   [] Not current, but scheduled   [x] NA  Mammogram   [x] Current       [] Not current   [] Not current, but scheduled   [] NA  Prostate           [] Current       [] Not current   [] Not current, but scheduled   [x] NA  PAP/Pelvic      [] Current       [] Not current   [] Not current, but scheduled   [x] NA  Skin                 [] Current       [] Not current   [] Not current, but scheduled   [x] NA     Hormone:  Lupron []   Last dose given:           Next dose due:   Eligard []   Last dose given:           Next dose due:   Aromatase Inhibitors []   Medication name:   N/A:  [x]           FALLS RISK SCREEN  Instructions:  Assess the patient and enter the appropriate indicators that are present for fall risk identification. Total the numbers entered and assign a fall risk score from Table 2.  Reassess patient at a minimum every 12 weeks or with status change. Assessment   Date  3/18/2022     1. Mental Ability: confusion/cognitively impaired 0     2. Elimination Issues: incontinence, frequency 0       3. Ambulatory: use of assistive devices (walker, cane, off-loading devices),        attached to equipment (IV pole, oxygen) 2     4. Sensory Limitations: dizziness, vertigo, impaired vision 0     5. Age less than 65        0     6. Age 72 or greater 1     7. Medication: diuretics, strong analgesics, hypnotics, sedatives,        antihypertensive agents 0   8. Falls:  recent history of falls within the last 3 months (not to include slipping or        tripping) 0   TOTAL 3    If score of 4 or greater was education given? No           TABLE 2   Risk Score Risk Level Plan of Care   0-3 Little or  No Risk 1. Provide assistance as indicated for ambulation activities  2. Reorient confused/cognitively impaired patient  3. Chair/bed in low position, stretcher/bed with siderails up except when performing patient care activities  5. Educate patient/family/caregiver on falls prevention  6.  Reassess in 12 weeks or with any noted change in patient condition which places them at a risk for a fall   4-6 Moderate Risk 1. Provide assistance as indicated for ambulation activities  2. Reorient confused/cognitively impaired patient  3. Chair/bed in low position, stretcher/bed with siderails up except when performing patient care activities  4. Educate patient/family/caregiver on falls prevention     7 or   Higher High Risk 1. Place patient in easily observable treatment room  2. Patient attended at all times by family member or staff  3. Provide assistance as indicated for ambulation activities  4. Reorient confused/cognitively impaired patient  5.   Chair/bed in low position, stretcher/bed with siderails up except when performing patient care activities  6. Educate patient/family/caregiver on falls prevention         PLAN: Patient is seen today in follow up. Has had teaching and simulation but awaited Medical Oncology follow-up. Saw Medical Oncologist today and they are ordering Oncotype testing which may take several weeks for results. Surgical site well healed. Dr. Brandon Mendoza notified of assessment and examined patient. Dr. Brandon Mendoza reviewed treatment plan with patient. She will start treatment next week on Tuesday and come every other day for 5 treatments. Patient voices understanding.         Neetu Kitchen RN

## 2022-03-18 NOTE — PROGRESS NOTES
Patient was seen after her Medical Oncology appointment today next door to review her radiation plan. Her skin on her breast is healing well. Based on my discussion with Med Onc, they are ok with proceeding with radiation despite a pending Oncotype score. The radiation logistics and side effects were reviewed with the patient. A plan is ready and we will begin next week.

## 2022-03-22 ENCOUNTER — HOSPITAL ENCOUNTER (OUTPATIENT)
Dept: RADIATION ONCOLOGY | Facility: MEDICAL CENTER | Age: 72
Discharge: HOME | End: 2022-03-22
Attending: STUDENT IN AN ORGANIZED HEALTH CARE EDUCATION/TRAINING PROGRAM
Payer: MEDICARE

## 2022-03-22 ENCOUNTER — HOSPITAL ENCOUNTER (OUTPATIENT)
Dept: RADIATION ONCOLOGY | Facility: MEDICAL CENTER | Age: 72
Discharge: HOME | End: 2022-03-22
Payer: MEDICARE

## 2022-03-22 VITALS
RESPIRATION RATE: 20 BRPM | SYSTOLIC BLOOD PRESSURE: 98 MMHG | OXYGEN SATURATION: 92 % | TEMPERATURE: 98 F | WEIGHT: 163.2 LBS | BODY MASS INDEX: 28.91 KG/M2 | HEART RATE: 58 BPM | DIASTOLIC BLOOD PRESSURE: 51 MMHG

## 2022-03-22 DIAGNOSIS — Z17.0 CARCINOMA OF UPPER-OUTER QUADRANT OF LEFT BREAST IN FEMALE, ESTROGEN RECEPTOR POSITIVE (HCC): Primary | ICD-10-CM

## 2022-03-22 DIAGNOSIS — C50.412 CARCINOMA OF UPPER-OUTER QUADRANT OF LEFT BREAST IN FEMALE, ESTROGEN RECEPTOR POSITIVE (HCC): Primary | ICD-10-CM

## 2022-03-22 PROCEDURE — 77385 HC NTSTY MODUL RAD TX DLVR SMPL: CPT | Performed by: STUDENT IN AN ORGANIZED HEALTH CARE EDUCATION/TRAINING PROGRAM

## 2022-03-22 PROCEDURE — 77014 PR CT GUIDANCE PLACEMENT RAD THERAPY FIELDS: CPT | Performed by: STUDENT IN AN ORGANIZED HEALTH CARE EDUCATION/TRAINING PROGRAM

## 2022-03-22 NOTE — PROGRESS NOTES
Gabe Lizbet  3/22/2022  Wt Readings from Last 3 Encounters:   03/22/22 163 lb 3.2 oz (74 kg)   03/18/22 163 lb 3.2 oz (74 kg)   03/10/22 170 lb 3.2 oz (77.2 kg)     Body mass index is 28.91 kg/m². Treatment Area:left breast    Patient was seen today for weekly visit. Today was her first treatment of 5  to left breast.  No complaints. Patient examined by Dr. Juan [address]. Once treatments complete, he will see her in one month for follow up. She voices understanding. Comfort Alteration    Fatigue: None    Nutritional Alteration  Anorexia: No   Nausea: No   Vomiting: No     Mucous Membrane Alteration  Drainage: No  Lymphedema: No    Skin Alteration   Sensation:intact    Radiation Dermatitis:  Intact [x]     Erythema  []     Discoloration  []     Rash []     Dry desquamation  []     Moist desquamation []       Emotional  Coping: effective      Injury, potential bleeding or infection: none    Lab Results   Component Value Date    WBC 10.4 04/23/2021     (H) 04/23/2021         BP (!) 98/51   Pulse 58   Temp 98 °F (36.7 °C) (Temporal)   Resp 20   Wt 163 lb 3.2 oz (74 kg)   SpO2 92%   BMI 28.91 kg/m²   Patient Currently in Pain: No                 Assessment/Plan: Patient was seen today for weekly visit.       Tim Ace RN

## 2022-03-22 NOTE — PROGRESS NOTES
Radiation Oncology On-Treatment Visit:     Fraction # 1 / 5 (6 Gy)    Diagnosis:  Ms. Liz Chavez is a 80 y.o. 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 Rosalba Reed. Final pathology consistent with a 8 mm tumor, with non-extensive DCIS, intermediate grade, LVI-, with negative margins with closet margin 2 mm from DCIS and 5 mm from invasive carcinoma, 0/2 lymph nodes positive. Treatment course: Accelerated partial breast irradiation to the left breast lumpectomy, using Roz IMRT regimen. Progress note:  Ms. Liz Chavez was seen and evaluated. Patient has not noticed any notable side effects of radiation. Physical exam:   VITAL SIGNS: BP (!) 98/51   Pulse 58   Temp 98 °F (36.7 °C) (Temporal)   Resp 20   Wt 163 lb 3.2 oz (74 kg)   SpO2 92%   BMI 28.91 kg/m²   ECO Asymptomatic   CONSTITUTIONAL: Well developed, well nourished female. Alert and oriented x3. No acute distress. HEENT: Head normocephalic and atraumatic. Extraocular movements intact. BREAST: Skin evaluated in treatment room prior to radiation. Skin well healed without any erythema or desquamation. NEUROLOGIC EXAM: Cranial nerves II through XII grossly intact. No focal neurologic deficit. Speech is fluent. Gait and posture are steady. PSYCHIATRIC:  Appropriate mood and affect for her clinical situation. Plan:  · Continue radiation therapy as planned. Routine skin care advice was given to the patient. · Case was discussed last week with Med Onc NP regarding the pending Oncotype test - per the NP, Dr. Tiffany Lepe felt the benefit from chemotherapy will be low and to proceed with radiation despite the pending test.   · I have checked the imaging performed to date, which confirm appropriate positioning.

## 2022-03-24 ENCOUNTER — HOSPITAL ENCOUNTER (OUTPATIENT)
Dept: RADIATION ONCOLOGY | Facility: MEDICAL CENTER | Age: 72
Discharge: HOME | End: 2022-03-24
Attending: STUDENT IN AN ORGANIZED HEALTH CARE EDUCATION/TRAINING PROGRAM
Payer: MEDICARE

## 2022-03-24 PROCEDURE — 77014 PR CT GUIDANCE PLACEMENT RAD THERAPY FIELDS: CPT | Performed by: STUDENT IN AN ORGANIZED HEALTH CARE EDUCATION/TRAINING PROGRAM

## 2022-03-24 PROCEDURE — 77385 HC NTSTY MODUL RAD TX DLVR SMPL: CPT | Performed by: STUDENT IN AN ORGANIZED HEALTH CARE EDUCATION/TRAINING PROGRAM

## 2022-03-29 ENCOUNTER — APPOINTMENT (OUTPATIENT)
Dept: RADIATION ONCOLOGY | Facility: MEDICAL CENTER | Age: 72
End: 2022-03-29
Payer: MEDICARE

## 2022-03-29 ENCOUNTER — APPOINTMENT (OUTPATIENT)
Dept: RADIATION ONCOLOGY | Facility: MEDICAL CENTER | Age: 72
End: 2022-03-29
Attending: STUDENT IN AN ORGANIZED HEALTH CARE EDUCATION/TRAINING PROGRAM
Payer: MEDICARE

## 2022-03-31 ENCOUNTER — HOSPITAL ENCOUNTER (OUTPATIENT)
Dept: RADIATION ONCOLOGY | Facility: MEDICAL CENTER | Age: 72
Discharge: HOME | End: 2022-03-31
Attending: STUDENT IN AN ORGANIZED HEALTH CARE EDUCATION/TRAINING PROGRAM
Payer: MEDICARE

## 2022-03-31 PROCEDURE — 77014 PR CT GUIDANCE PLACEMENT RAD THERAPY FIELDS: CPT | Performed by: STUDENT IN AN ORGANIZED HEALTH CARE EDUCATION/TRAINING PROGRAM

## 2022-03-31 PROCEDURE — 77385 HC NTSTY MODUL RAD TX DLVR SMPL: CPT | Performed by: STUDENT IN AN ORGANIZED HEALTH CARE EDUCATION/TRAINING PROGRAM

## 2022-04-05 ENCOUNTER — APPOINTMENT (OUTPATIENT)
Dept: RADIATION ONCOLOGY | Facility: MEDICAL CENTER | Age: 72
End: 2022-04-05
Payer: MEDICARE

## 2022-04-05 ENCOUNTER — HOSPITAL ENCOUNTER (OUTPATIENT)
Dept: RADIATION ONCOLOGY | Facility: MEDICAL CENTER | Age: 72
Discharge: HOME | End: 2022-04-05
Attending: STUDENT IN AN ORGANIZED HEALTH CARE EDUCATION/TRAINING PROGRAM
Payer: MEDICARE

## 2022-04-05 ENCOUNTER — TELEPHONE (OUTPATIENT)
Dept: RADIATION ONCOLOGY | Facility: MEDICAL CENTER | Age: 72
End: 2022-04-05

## 2022-04-05 ENCOUNTER — APPOINTMENT (OUTPATIENT)
Dept: RADIATION ONCOLOGY | Facility: MEDICAL CENTER | Age: 72
End: 2022-04-05
Attending: STUDENT IN AN ORGANIZED HEALTH CARE EDUCATION/TRAINING PROGRAM
Payer: MEDICARE

## 2022-04-05 PROCEDURE — 77014 PR CT GUIDANCE PLACEMENT RAD THERAPY FIELDS: CPT | Performed by: STUDENT IN AN ORGANIZED HEALTH CARE EDUCATION/TRAINING PROGRAM

## 2022-04-05 PROCEDURE — 77385 HC NTSTY MODUL RAD TX DLVR SMPL: CPT | Performed by: STUDENT IN AN ORGANIZED HEALTH CARE EDUCATION/TRAINING PROGRAM

## 2022-04-05 NOTE — TELEPHONE ENCOUNTER
Met with Joan Mejía after her treatment. She has on radiation treatment left on Thursday. Complains of mild fatigue. Provided with Aquafor samples which she states she has been applying to treatment site twice daily. No other complaints.

## 2022-04-07 ENCOUNTER — HOSPITAL ENCOUNTER (OUTPATIENT)
Dept: RADIATION ONCOLOGY | Facility: MEDICAL CENTER | Age: 72
Discharge: HOME | End: 2022-04-07
Attending: STUDENT IN AN ORGANIZED HEALTH CARE EDUCATION/TRAINING PROGRAM
Payer: MEDICARE

## 2022-04-07 PROCEDURE — 77014 PR CT GUIDANCE PLACEMENT RAD THERAPY FIELDS: CPT | Performed by: RADIOLOGY

## 2022-04-07 PROCEDURE — 77427 RADIATION TX MANAGEMENT X5: CPT | Performed by: STUDENT IN AN ORGANIZED HEALTH CARE EDUCATION/TRAINING PROGRAM

## 2022-04-07 PROCEDURE — 77385 HC NTSTY MODUL RAD TX DLVR SMPL: CPT | Performed by: RADIOLOGY

## 2022-05-05 ENCOUNTER — HOSPITAL ENCOUNTER (INPATIENT)
Age: 72
LOS: 5 days | Discharge: HOME | DRG: 853 | End: 2022-05-10
Attending: EMERGENCY MEDICINE | Admitting: INTERNAL MEDICINE
Payer: MEDICARE

## 2022-05-05 ENCOUNTER — APPOINTMENT (OUTPATIENT)
Dept: CT IMAGING | Age: 72
DRG: 853 | End: 2022-05-05
Payer: MEDICARE

## 2022-05-05 ENCOUNTER — APPOINTMENT (OUTPATIENT)
Dept: GENERAL RADIOLOGY | Age: 72
DRG: 853 | End: 2022-05-05
Payer: MEDICARE

## 2022-05-05 DIAGNOSIS — A41.9 SEPTICEMIA (HCC): ICD-10-CM

## 2022-05-05 DIAGNOSIS — J18.9 PNEUMONIA OF BOTH LUNGS DUE TO INFECTIOUS ORGANISM, UNSPECIFIED PART OF LUNG: Primary | ICD-10-CM

## 2022-05-05 PROBLEM — D53.1 MEGALOBLASTIC ANEMIA: Status: ACTIVE | Noted: 2022-05-05

## 2022-05-05 PROBLEM — E88.09 HYPOALBUMINEMIA: Status: ACTIVE | Noted: 2022-05-05

## 2022-05-05 LAB
-: ABNORMAL
ABSOLUTE EOS #: <0.03 K/UL (ref 0–0.44)
ABSOLUTE IMMATURE GRANULOCYTE: 0.17 K/UL (ref 0–0.3)
ABSOLUTE LYMPH #: 0.95 K/UL (ref 1.1–3.7)
ABSOLUTE MONO #: 1.01 K/UL (ref 0.1–1.2)
ALBUMIN SERPL-MCNC: 3.1 G/DL (ref 3.5–5.2)
ALBUMIN/GLOBULIN RATIO: 1.2 (ref 1–2.5)
ALP BLD-CCNC: 76 U/L (ref 35–104)
ALT SERPL-CCNC: 12 U/L (ref 5–33)
ANION GAP SERPL CALCULATED.3IONS-SCNC: 10 MMOL/L (ref 9–17)
ANION GAP: 8 MMOL/L (ref 7–16)
AST SERPL-CCNC: 39 U/L
BASOPHILS # BLD: 0 % (ref 0–2)
BASOPHILS ABSOLUTE: 0.06 K/UL (ref 0–0.2)
BILIRUB SERPL-MCNC: 0.23 MG/DL (ref 0.3–1.2)
BILIRUBIN URINE: NEGATIVE
BUN BLDV-MCNC: 18 MG/DL (ref 8–23)
CALCIUM SERPL-MCNC: 8.3 MG/DL (ref 8.6–10.4)
CASTS UA: ABNORMAL /LPF (ref 0–2)
CASTS UA: ABNORMAL /LPF (ref 0–2)
CHLORIDE BLD-SCNC: 107 MMOL/L (ref 98–107)
CO2: 24 MMOL/L (ref 20–31)
COLOR: YELLOW
CREAT SERPL-MCNC: 0.94 MG/DL (ref 0.5–0.9)
EOSINOPHILS RELATIVE PERCENT: 0 % (ref 1–4)
EPITHELIAL CELLS UA: ABNORMAL /HPF (ref 0–5)
FLU A ANTIGEN: NEGATIVE
FLU B ANTIGEN: NEGATIVE
GFR AFRICAN AMERICAN: >60 ML/MIN
GFR NON-AFRICAN AMERICAN: 47 ML/MIN
GFR NON-AFRICAN AMERICAN: 59 ML/MIN
GFR SERPL CREATININE-BSD FRML MDRD: 57 ML/MIN
GFR SERPL CREATININE-BSD FRML MDRD: ABNORMAL ML/MIN/{1.73_M2}
GFR SERPL CREATININE-BSD FRML MDRD: ABNORMAL ML/MIN/{1.73_M2}
GLUCOSE BLD-MCNC: 101 MG/DL (ref 74–100)
GLUCOSE BLD-MCNC: 102 MG/DL (ref 70–99)
GLUCOSE URINE: ABNORMAL
HCO3 VENOUS: 27.8 MMOL/L (ref 22–29)
HCT VFR BLD CALC: 40.2 % (ref 36.3–47.1)
HEMOGLOBIN: 13.2 G/DL (ref 11.9–15.1)
IMMATURE GRANULOCYTES: 1 %
INR BLD: 1.1
KETONES, URINE: ABNORMAL
LACTIC ACID, SEPSIS WHOLE BLOOD: 2.2 MMOL/L (ref 0.5–1.9)
LACTIC ACID, SEPSIS WHOLE BLOOD: 2.8 MMOL/L (ref 0.5–1.9)
LACTIC ACID, SEPSIS WHOLE BLOOD: 2.9 MMOL/L (ref 0.5–1.9)
LEUKOCYTE ESTERASE, URINE: NEGATIVE
LYMPHOCYTES # BLD: 4 % (ref 24–43)
MCH RBC QN AUTO: 35.5 PG (ref 25.2–33.5)
MCHC RBC AUTO-ENTMCNC: 32.8 G/DL (ref 28.4–34.8)
MCV RBC AUTO: 108.1 FL (ref 82.6–102.9)
MONOCYTES # BLD: 5 % (ref 3–12)
MUCUS: ABNORMAL
NITRITE, URINE: NEGATIVE
NRBC AUTOMATED: 0 PER 100 WBC
O2 SAT, VEN: 32 % (ref 60–85)
PARTIAL THROMBOPLASTIN TIME: 29.1 SEC (ref 20.5–30.5)
PARTIAL THROMBOPLASTIN TIME: 30.2 SEC (ref 20.5–30.5)
PCO2, VEN: 55.5 MM HG (ref 41–51)
PDW BLD-RTO: 15.6 % (ref 11.8–14.4)
PH UA: 5.5 (ref 5–8)
PH VENOUS: 7.31 (ref 7.32–7.43)
PLATELET # BLD: 288 K/UL (ref 138–453)
PMV BLD AUTO: 10.2 FL (ref 8.1–13.5)
PO2, VEN: 22.3 MM HG (ref 30–50)
POC BUN: 22 MG/DL (ref 8–26)
POC CHLORIDE: 108 MMOL/L (ref 98–107)
POC CREATININE: 1.14 MG/DL (ref 0.51–1.19)
POC HEMATOCRIT: 45 % (ref 36–46)
POC HEMOGLOBIN: 15.3 G/DL (ref 12–16)
POC IONIZED CALCIUM: 1.23 MMOL/L (ref 1.15–1.33)
POC LACTIC ACID: 2.44 MMOL/L (ref 0.56–1.39)
POC POTASSIUM: 4.7 MMOL/L (ref 3.5–4.5)
POC SODIUM: 144 MMOL/L (ref 138–146)
POC TCO2: 29 MMOL/L (ref 22–30)
POSITIVE BASE EXCESS, VEN: 0 (ref 0–3)
POTASSIUM SERPL-SCNC: 4.6 MMOL/L (ref 3.7–5.3)
PRO-BNP: 6463 PG/ML
PROCALCITONIN: 8.58 NG/ML
PROTEIN UA: ABNORMAL
PROTHROMBIN TIME: 11.6 SEC (ref 9.1–12.3)
RBC # BLD: 3.72 M/UL (ref 3.95–5.11)
RBC # BLD: ABNORMAL 10*6/UL
RBC UA: ABNORMAL /HPF (ref 0–2)
SARS-COV-2, RAPID: NOT DETECTED
SEG NEUTROPHILS: 90 % (ref 36–65)
SEGMENTED NEUTROPHILS ABSOLUTE COUNT: 19.68 K/UL (ref 1.5–8.1)
SODIUM BLD-SCNC: 141 MMOL/L (ref 135–144)
SPECIFIC GRAVITY UA: 1.03 (ref 1–1.03)
SPECIMEN DESCRIPTION: NORMAL
TOTAL PROTEIN: 5.7 G/DL (ref 6.4–8.3)
TROPONIN, HIGH SENSITIVITY: 538 NG/L (ref 0–14)
TROPONIN, HIGH SENSITIVITY: 570 NG/L (ref 0–14)
TURBIDITY: CLEAR
URINE HGB: NEGATIVE
UROBILINOGEN, URINE: NORMAL
WBC # BLD: 21.9 K/UL (ref 3.5–11.3)
WBC UA: ABNORMAL /HPF (ref 0–5)

## 2022-05-05 PROCEDURE — 85025 COMPLETE CBC W/AUTO DIFF WBC: CPT

## 2022-05-05 PROCEDURE — 85610 PROTHROMBIN TIME: CPT

## 2022-05-05 PROCEDURE — 2580000003 HC RX 258: Performed by: STUDENT IN AN ORGANIZED HEALTH CARE EDUCATION/TRAINING PROGRAM

## 2022-05-05 PROCEDURE — 2060000000 HC ICU INTERMEDIATE R&B

## 2022-05-05 PROCEDURE — 96374 THER/PROPH/DIAG INJ IV PUSH: CPT

## 2022-05-05 PROCEDURE — 6360000002 HC RX W HCPCS: Performed by: STUDENT IN AN ORGANIZED HEALTH CARE EDUCATION/TRAINING PROGRAM

## 2022-05-05 PROCEDURE — 83880 ASSAY OF NATRIURETIC PEPTIDE: CPT

## 2022-05-05 PROCEDURE — 83605 ASSAY OF LACTIC ACID: CPT

## 2022-05-05 PROCEDURE — 6360000004 HC RX CONTRAST MEDICATION: Performed by: STUDENT IN AN ORGANIZED HEALTH CARE EDUCATION/TRAINING PROGRAM

## 2022-05-05 PROCEDURE — 70450 CT HEAD/BRAIN W/O DYE: CPT

## 2022-05-05 PROCEDURE — 87086 URINE CULTURE/COLONY COUNT: CPT

## 2022-05-05 PROCEDURE — 84520 ASSAY OF UREA NITROGEN: CPT

## 2022-05-05 PROCEDURE — 94660 CPAP INITIATION&MGMT: CPT

## 2022-05-05 PROCEDURE — 87804 INFLUENZA ASSAY W/OPTIC: CPT

## 2022-05-05 PROCEDURE — 82330 ASSAY OF CALCIUM: CPT

## 2022-05-05 PROCEDURE — 80051 ELECTROLYTE PANEL: CPT

## 2022-05-05 PROCEDURE — 99285 EMERGENCY DEPT VISIT HI MDM: CPT

## 2022-05-05 PROCEDURE — 84145 PROCALCITONIN (PCT): CPT

## 2022-05-05 PROCEDURE — 85014 HEMATOCRIT: CPT

## 2022-05-05 PROCEDURE — 82803 BLOOD GASES ANY COMBINATION: CPT

## 2022-05-05 PROCEDURE — 82565 ASSAY OF CREATININE: CPT

## 2022-05-05 PROCEDURE — 81001 URINALYSIS AUTO W/SCOPE: CPT

## 2022-05-05 PROCEDURE — 71260 CT THORAX DX C+: CPT

## 2022-05-05 PROCEDURE — 80053 COMPREHEN METABOLIC PANEL: CPT

## 2022-05-05 PROCEDURE — 85730 THROMBOPLASTIN TIME PARTIAL: CPT

## 2022-05-05 PROCEDURE — 82947 ASSAY GLUCOSE BLOOD QUANT: CPT

## 2022-05-05 PROCEDURE — 87040 BLOOD CULTURE FOR BACTERIA: CPT

## 2022-05-05 PROCEDURE — 87635 SARS-COV-2 COVID-19 AMP PRB: CPT

## 2022-05-05 PROCEDURE — 93005 ELECTROCARDIOGRAM TRACING: CPT | Performed by: EMERGENCY MEDICINE

## 2022-05-05 PROCEDURE — 99223 1ST HOSP IP/OBS HIGH 75: CPT | Performed by: INTERNAL MEDICINE

## 2022-05-05 PROCEDURE — 71045 X-RAY EXAM CHEST 1 VIEW: CPT

## 2022-05-05 PROCEDURE — 84484 ASSAY OF TROPONIN QUANT: CPT

## 2022-05-05 PROCEDURE — 74177 CT ABD & PELVIS W/CONTRAST: CPT

## 2022-05-05 PROCEDURE — 36415 COLL VENOUS BLD VENIPUNCTURE: CPT

## 2022-05-05 PROCEDURE — 2580000003 HC RX 258: Performed by: EMERGENCY MEDICINE

## 2022-05-05 PROCEDURE — 94640 AIRWAY INHALATION TREATMENT: CPT

## 2022-05-05 RX ORDER — ENOXAPARIN SODIUM 100 MG/ML
40 INJECTION SUBCUTANEOUS DAILY
Status: DISCONTINUED | OUTPATIENT
Start: 2022-05-06 | End: 2022-05-05

## 2022-05-05 RX ORDER — HEPARIN SODIUM 1000 [USP'U]/ML
4000 INJECTION, SOLUTION INTRAVENOUS; SUBCUTANEOUS ONCE
Status: COMPLETED | OUTPATIENT
Start: 2022-05-05 | End: 2022-05-05

## 2022-05-05 RX ORDER — BUDESONIDE AND FORMOTEROL FUMARATE DIHYDRATE 160; 4.5 UG/1; UG/1
2 AEROSOL RESPIRATORY (INHALATION) 2 TIMES DAILY
Status: DISCONTINUED | OUTPATIENT
Start: 2022-05-05 | End: 2022-05-10 | Stop reason: HOSPADM

## 2022-05-05 RX ORDER — ACETAMINOPHEN 650 MG/1
650 SUPPOSITORY RECTAL EVERY 6 HOURS PRN
Status: DISCONTINUED | OUTPATIENT
Start: 2022-05-05 | End: 2022-05-10 | Stop reason: HOSPADM

## 2022-05-05 RX ORDER — SODIUM CHLORIDE 0.9 % (FLUSH) 0.9 %
5-40 SYRINGE (ML) INJECTION PRN
Status: DISCONTINUED | OUTPATIENT
Start: 2022-05-05 | End: 2022-05-09 | Stop reason: SDUPTHER

## 2022-05-05 RX ORDER — ONDANSETRON 4 MG/1
4 TABLET, ORALLY DISINTEGRATING ORAL EVERY 8 HOURS PRN
Status: DISCONTINUED | OUTPATIENT
Start: 2022-05-05 | End: 2022-05-10 | Stop reason: HOSPADM

## 2022-05-05 RX ORDER — HEPARIN SODIUM 1000 [USP'U]/ML
60 INJECTION, SOLUTION INTRAVENOUS; SUBCUTANEOUS ONCE
Status: DISCONTINUED | OUTPATIENT
Start: 2022-05-05 | End: 2022-05-05

## 2022-05-05 RX ORDER — DOCUSATE SODIUM 100 MG/1
100 CAPSULE, LIQUID FILLED ORAL 2 TIMES DAILY PRN
Status: DISCONTINUED | OUTPATIENT
Start: 2022-05-05 | End: 2022-05-10 | Stop reason: HOSPADM

## 2022-05-05 RX ORDER — SODIUM CHLORIDE 9 MG/ML
INJECTION, SOLUTION INTRAVENOUS CONTINUOUS
Status: DISCONTINUED | OUTPATIENT
Start: 2022-05-05 | End: 2022-05-06

## 2022-05-05 RX ORDER — CEFTRIAXONE 1 G/1
INJECTION, POWDER, FOR SOLUTION INTRAMUSCULAR; INTRAVENOUS
Status: DISCONTINUED
Start: 2022-05-05 | End: 2022-05-05

## 2022-05-05 RX ORDER — POLYETHYLENE GLYCOL 3350 17 G/17G
17 POWDER, FOR SOLUTION ORAL DAILY PRN
Status: DISCONTINUED | OUTPATIENT
Start: 2022-05-05 | End: 2022-05-10 | Stop reason: HOSPADM

## 2022-05-05 RX ORDER — HEPARIN SODIUM 1000 [USP'U]/ML
2000 INJECTION, SOLUTION INTRAVENOUS; SUBCUTANEOUS PRN
Status: DISCONTINUED | OUTPATIENT
Start: 2022-05-05 | End: 2022-05-10

## 2022-05-05 RX ORDER — 0.9 % SODIUM CHLORIDE 0.9 %
30 INTRAVENOUS SOLUTION INTRAVENOUS ONCE
Status: COMPLETED | OUTPATIENT
Start: 2022-05-05 | End: 2022-05-05

## 2022-05-05 RX ORDER — SODIUM CHLORIDE 9 MG/ML
INJECTION, SOLUTION INTRAVENOUS PRN
Status: DISCONTINUED | OUTPATIENT
Start: 2022-05-05 | End: 2022-05-09 | Stop reason: SDUPTHER

## 2022-05-05 RX ORDER — ATORVASTATIN CALCIUM 40 MG/1
40 TABLET, FILM COATED ORAL DAILY
Status: DISCONTINUED | OUTPATIENT
Start: 2022-05-06 | End: 2022-05-10 | Stop reason: HOSPADM

## 2022-05-05 RX ORDER — SODIUM CHLORIDE 0.9 % (FLUSH) 0.9 %
5-40 SYRINGE (ML) INJECTION EVERY 12 HOURS SCHEDULED
Status: DISCONTINUED | OUTPATIENT
Start: 2022-05-05 | End: 2022-05-09 | Stop reason: SDUPTHER

## 2022-05-05 RX ORDER — HEPARIN SODIUM 1000 [USP'U]/ML
4000 INJECTION, SOLUTION INTRAVENOUS; SUBCUTANEOUS PRN
Status: DISCONTINUED | OUTPATIENT
Start: 2022-05-05 | End: 2022-05-10

## 2022-05-05 RX ORDER — ALBUTEROL SULFATE 2.5 MG/3ML
2.5 SOLUTION RESPIRATORY (INHALATION) EVERY 6 HOURS PRN
Status: DISCONTINUED | OUTPATIENT
Start: 2022-05-05 | End: 2022-05-10 | Stop reason: HOSPADM

## 2022-05-05 RX ORDER — ONDANSETRON 2 MG/ML
4 INJECTION INTRAMUSCULAR; INTRAVENOUS EVERY 6 HOURS PRN
Status: DISCONTINUED | OUTPATIENT
Start: 2022-05-05 | End: 2022-05-10 | Stop reason: HOSPADM

## 2022-05-05 RX ORDER — ACETAMINOPHEN 325 MG/1
650 TABLET ORAL EVERY 6 HOURS PRN
Status: DISCONTINUED | OUTPATIENT
Start: 2022-05-05 | End: 2022-05-10 | Stop reason: HOSPADM

## 2022-05-05 RX ORDER — BUSPIRONE HYDROCHLORIDE 5 MG/1
7.5 TABLET ORAL 2 TIMES DAILY
Status: DISCONTINUED | OUTPATIENT
Start: 2022-05-05 | End: 2022-05-10 | Stop reason: HOSPADM

## 2022-05-05 RX ADMIN — IOPAMIDOL 75 ML: 755 INJECTION, SOLUTION INTRAVENOUS at 17:27

## 2022-05-05 RX ADMIN — PIPERACILLIN AND TAZOBACTAM 3375 MG: 3; .375 INJECTION, POWDER, FOR SOLUTION INTRAVENOUS at 20:52

## 2022-05-05 RX ADMIN — Medication 500 MG: at 18:18

## 2022-05-05 RX ADMIN — ALBUTEROL SULFATE 2.5 MG: 2.5 SOLUTION RESPIRATORY (INHALATION) at 23:46

## 2022-05-05 RX ADMIN — SODIUM CHLORIDE 2217 ML: 9 INJECTION, SOLUTION INTRAVENOUS at 17:46

## 2022-05-05 RX ADMIN — SODIUM CHLORIDE: 9 INJECTION, SOLUTION INTRAVENOUS at 22:43

## 2022-05-05 RX ADMIN — HEPARIN SODIUM 4000 UNITS: 1000 INJECTION INTRAVENOUS; SUBCUTANEOUS at 22:59

## 2022-05-05 RX ADMIN — SODIUM CHLORIDE, PRESERVATIVE FREE 10 ML: 5 INJECTION INTRAVENOUS at 23:00

## 2022-05-05 RX ADMIN — HEPARIN SODIUM 12 UNITS/KG/HR: 5000 INJECTION, SOLUTION INTRAVENOUS; SUBCUTANEOUS at 23:02

## 2022-05-05 RX ADMIN — CEFTRIAXONE SODIUM 1000 MG: 1 INJECTION, POWDER, FOR SOLUTION INTRAMUSCULAR; INTRAVENOUS at 18:13

## 2022-05-05 ASSESSMENT — ENCOUNTER SYMPTOMS
APNEA: 0
CONSTIPATION: 0
VOMITING: 0
CHOKING: 0
WHEEZING: 0
DIARRHEA: 0
ABDOMINAL PAIN: 0
STRIDOR: 0
BLOOD IN STOOL: 0
NAUSEA: 0
ABDOMINAL DISTENTION: 0
COUGH: 0
SHORTNESS OF BREATH: 0

## 2022-05-05 ASSESSMENT — PAIN DESCRIPTION - ORIENTATION: ORIENTATION: LOWER

## 2022-05-05 ASSESSMENT — PAIN DESCRIPTION - FREQUENCY: FREQUENCY: CONTINUOUS

## 2022-05-05 ASSESSMENT — PAIN SCALES - GENERAL: PAINLEVEL_OUTOF10: 5

## 2022-05-05 ASSESSMENT — PAIN DESCRIPTION - DESCRIPTORS: DESCRIPTORS: ACHING

## 2022-05-05 ASSESSMENT — PAIN DESCRIPTION - LOCATION: LOCATION: BACK

## 2022-05-05 NOTE — ED PROVIDER NOTES
[facility] ED     Emergency Department     Faculty Attestation    I performed a history and physical examination of the patient and discussed management with the resident. I reviewed the residents note and agree with the documented findings and plan of care. Any areas of disagreement are noted on the chart. I was personally present for the key portions of any procedures. I have documented in the chart those procedures where I was not present during the key portions. I have reviewed the emergency nurses triage note. I agree with the chief complaint, past medical history, past surgical history, allergies, medications, social and family history as documented unless otherwise noted below. For Physician Assistant/ Nurse Practitioner cases/documentation I have personally evaluated this patient and have completed at least one if not all key elements of the E/M (history, physical exam, and MDM). Additional findings are as noted. Patient presents with altered mental status. Patient is febrile and tachycardic on arrival.  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 The person that she is supposed to care for realize that she had not seen her in a while and called EMS. On arrival here, patient is able to tell me her name and was able to follow commands but was unable to answer any other questions. Lungs are clear to auscultation bilaterally and heart sounds are tachycardic but regular. Abdomen was soft and patient did flinch on palpation of the abdomen. She does move all 4 extremities. Will get EKG, labs, CT scan of the head, chest, and abdomen. We will plan to admit patient.     EKG Interpretation    Interpreted by emergency department physician    Rhythm: normal sinus   Rate: normal  Axis: right  Ectopy: none  Conduction: normal  ST Segments: normal  T Waves: non specific changes  Q Waves: none    Clinical Impression: non-specific EKG    Anais Sabillon MD Rhonda Martinez MD  Attending Emergency  Physician              Elver Vides MD  05/05/22 014

## 2022-05-05 NOTE — ED PROVIDER NOTES
[facility]  ED  Emergency Department Encounter  EmergencyMedicine Resident     Pt Lorraine Trevino  MRN: [id number]  Margiegfmaru 1950  Date of evaluation: 5/5/22  PCP:  Addison Quintanilla MD    This patient was evaluated in the Emergency Department for symptoms described in the history of present illness. The patient was evaluated in the context of the global COVID-19 pandemic, which necessitated consideration that the patient might be at risk for infection with the SARS-CoV-2 virus that causes COVID-19. Institutional protocols and algorithms that pertain to the evaluation of patients at risk for COVID-19 are in a state of rapid change based on information released by regulatory bodies including the CDC and federal and state organizations. These policies and algorithms were followed during the patient's care in the ED. CHIEF COMPLAINT       Altered mental status    HISTORY OF PRESENT ILLNESS  (Location/Symptom, Timing/Onset, Context/Setting, Quality, Duration, Modifying Factors, Severity.)      Rosmery Stubbs is a 80 y.o. female who presents with altered mental status. Patient is brought in via EMS for altered mental status, found altered by  called EMS, EMS found patient satting in the low 80s, started patient on oxygen, transported to the emergency department. Family reports patient has been having intermittent pneumonia for the past couple weeks, unsure what antibiotic she has been on. Patient started having altered mental status last night, worsening today. On arrival, patient is alert to self, not to place or time, satting well on nonrebreather, unable to perform reliable HPI or ROS. Patient does have history of CHF, CAD per EMR.     PAST MEDICAL / SURGICAL / SOCIAL / FAMILY HISTORY      has a past medical history of Acid reflux, Anxiety and depression, Asthma, CAD (coronary artery disease), CHF (congestive heart failure) ([address] COPD (chronic obstructive pulmonary disease) ([facility] [address] Hyperlipidemia, Hypertension, IBS (irritable bowel syndrome), MI (myocardial infarction) ([facility] [address] On home O2, Osteoarthritis, Peptic ulcer, and PNA (pneumonia). has a past surgical history that includes cervical fusion; Tonsillectomy; Cardiac catheterization (2017); Upper gastrointestinal endoscopy (N/A, 7/31/2020); Colonoscopy (N/A, 7/31/2020); and Upper gastrointestinal endoscopy (N/A, 6/24/2021). Social History     Socioeconomic History    Marital status: Single     Spouse name: Not on file    Number of children: Not on file    Years of education: Not on file    Highest education level: Not on file   Occupational History    Not on file   Tobacco Use    Smoking status: Current Every Day Smoker     Packs/day: 0.25     Years: 40.00     Pack years: 10.00     Types: Cigarettes    Smokeless tobacco: Never Used    Tobacco comment: pt on chantix   Vaping Use    Vaping Use: Never used   Substance and Sexual Activity    Alcohol use: No     Alcohol/week: 0.0 standard drinks    Drug use: No    Sexual activity: Not on file   Other Topics Concern    Not on file   Social History Narrative    Not on file     Social Determinants of Health     Financial Resource Strain:     Difficulty of Paying Living Expenses: Not on file   Food Insecurity:     Worried About Running Out of Food in the Last Year: Not on file    Juan of Food in the Last Year: Not on file   Transportation Needs:     Lack of Transportation (Medical): Not on file    Lack of Transportation (Non-Medical):  Not on file   Physical Activity:     Days of Exercise per Week: Not on file    Minutes of Exercise per Session: Not on file   Stress:     Feeling of Stress : Not on file   Social Connections:     Frequency of Communication with Friends and Family: Not on file    Frequency of Social Gatherings with Friends and Family: Not on file    Attends Baptism Services: Not on file    Active Member of Clubs or Organizations: Not on file   Bhumika Curl Attends Club or Organization Meetings: Not on file    Marital Status: Not on file   Intimate Partner Violence:     Fear of Current or Ex-Partner: Not on file    Emotionally Abused: Not on file    Physically Abused: Not on file    Sexually Abused: Not on file   Housing Stability:     Unable to Pay for Housing in the Last Year: Not on file    Number of Christy in the Last Year: Not on file    Unstable Housing in the Last Year: Not on file       No family history on file. Allergies:  Sulfa antibiotics    Home Medications:  Prior to Admission medications    Medication Sig Start Date End Date Taking? Authorizing Provider   predniSONE (DELTASONE) 10 MG tablet 3 TABS PO DAILY FOR 3 DAYS, THEN 2 TABS PO DAILY FOR 3 DAYS ,THEN 1 TAB PO DAILY FOR 3 DAYS 1/14/21   Historical Provider, MD   NONFORMULARY Pt uses O2 3L NC prn    Historical Provider, MD   metOLazone (ZAROXOLYN) 2.5 MG tablet  9/17/20   Historical Provider, MD   cyclobenzaprine (FLEXERIL) 5 MG tablet TAKE 1 TABLET BY MOUTH THREE TIMES A DAY AS NEEDED 9/3/20   Historical Provider, MD   albuterol (PROVENTIL) (2.5 MG/3ML) 0.083% nebulizer solution Take 3 mLs by nebulization every 6 hours as needed for Wheezing 6/1/19   Bakari Mckinney MD   gabapentin (NEURONTIN) 600 MG tablet Take 1 tablet by mouth 3 times daily for 30 days.  6/1/19 6/24/21  Bakari Mckinney MD   docusate sodium (COLACE) 100 MG capsule Take 1 capsule by mouth 2 times daily as needed for Constipation 6/1/19   Jeffrey Valle MD   varenicline (CHANTIX) 0.5 MG tablet Take 1-2 tablets by mouth See Admin Instructions 0.5mg DAILY for 3 days followed by 0.5mg TWICE DAILY for 4 days followed by 1mg TWICE DAILY 6/1/19   Bakari Mckinney MD   atorvastatin (LIPITOR) 40 MG tablet Take 40 mg by mouth daily    Historical Provider, MD   diclofenac sodium 1 % GEL Apply 4 g topically 3 times daily To knees, hips, spine, neck, arms    Historical Provider, MD   sertraline (ZOLOFT) 25 MG tablet Take 25 mg by mouth daily    Historical Provider, MD   tiotropium (SPIRIVA RESPIMAT) 2.5 MCG/ACT AERS inhaler Inhale 2 puffs into the lungs daily    Historical Provider, MD   oxyCODONE-acetaminophen (PERCOCET) 5-325 MG per tablet Take 1 tablet by mouth every 8 hours as needed for Pain. Historical Provider, MD   busPIRone (BUSPAR) 7.5 MG tablet Take 7.5 mg by mouth 2 times daily     Historical Provider, MD   fluticasone (FLONASE) 50 MCG/ACT nasal spray 1 spray by Each Nostril route daily    Historical Provider, MD   furosemide (LASIX) 40 MG tablet Take 40 mg by mouth daily    Historical Provider, MD   isosorbide mononitrate (IMDUR) 30 MG extended release tablet Take 30 mg by mouth daily    Historical Provider, MD   metoprolol succinate (TOPROL XL) 25 MG extended release tablet Take 25 mg by mouth daily    Historical Provider, MD   montelukast (SINGULAIR) 10 MG tablet Take 10 mg by mouth every morning     Historical Provider, MD   omeprazole (PRILOSEC) 40 MG delayed release capsule Take 40 mg by mouth daily     Historical Provider, MD   budesonide-formoterol (SYMBICORT) 160-4.5 MCG/ACT AERO Inhale 2 puffs into the lungs 2 times daily    Historical Provider, MD   amitriptyline (ELAVIL) 25 MG tablet Take 25 mg by mouth nightly as needed for Sleep     Historical Provider, MD   Multiple Vitamins-Minerals (THERAPEUTIC MULTIVITAMIN-MINERALS) tablet Take 1 tablet by mouth daily    Historical Provider, MD       REVIEW OF SYSTEMS    (2-9 systems for level 4, 10 or more for level 5)      Review of Systems   Unable to obtain    PHYSICAL EXAM   (up to 7 for level 4, 8 or more for level 5)      INITIAL VITALS:   BP (!) 125/113   Pulse 94   Temp 100.4 °F (38 °C) (Oral)   Resp 22   Wt 163 lb (73.9 kg)   BMI 28.87 kg/m²     Physical Exam  Constitutional:       General: She is not in acute distress. Appearance: She is well-developed. She is ill-appearing. She is not toxic-appearing or diaphoretic.       Comments: Patient is alert to self, not to time or place, maintaining airway, appears altered   HENT:      Head: Normocephalic and atraumatic. Right Ear: External ear normal.      Left Ear: External ear normal.   Eyes:      General:         Right eye: No discharge. Left eye: No discharge. Extraocular Movements: Extraocular movements intact. Pupils: Pupils are equal, round, and reactive to light. Neck:      Vascular: No JVD. Trachea: No tracheal deviation. Cardiovascular:      Rate and Rhythm: Normal rate and regular rhythm. Pulses: Normal pulses. Heart sounds: Normal heart sounds. No murmur heard. No friction rub. No gallop. Pulmonary:      Effort: Pulmonary effort is normal. No respiratory distress. Breath sounds: Normal breath sounds. No stridor. No wheezing, rhonchi or rales. Chest:      Chest wall: No tenderness. Abdominal:      General: There is no distension. Palpations: Abdomen is soft. There is no mass. Tenderness: There is abdominal tenderness. There is no right CVA tenderness, left CVA tenderness or guarding. Comments: Abdomen soft, nondistended, tenderness throughout, no point tenderness, negative Hannah's, no ecchymosis, no CVA tenderness bilateral   Musculoskeletal:         General: No tenderness. Normal range of motion. Cervical back: Normal range of motion and neck supple. No rigidity or tenderness. Right lower leg: No edema. Left lower leg: No edema. Skin:     General: Skin is warm. Capillary Refill: Capillary refill takes less than 2 seconds. Neurological:      Mental Status: She is alert and oriented to person, place, and time. Cranial Nerves: No cranial nerve deficit. Sensory: No sensory deficit. Motor: No weakness.       Coordination: Coordination normal.   Psychiatric:         Behavior: Behavior normal.         DIFFERENTIAL  DIAGNOSIS     PLAN (LABS / IMAGING / EKG):  Orders Placed This Encounter   Procedures    Culture, Blood 1    Culture, Blood 1    COVID-19, Rapid    Culture, Urine    RAPID INFLUENZA A/B ANTIGENS    XR CHEST PORTABLE    CT HEAD WO CONTRAST    CT CHEST PULMONARY EMBOLISM W CONTRAST    CT ABDOMEN PELVIS W IV CONTRAST Additional Contrast? None    CBC with Auto Differential    Comprehensive Metabolic Panel    Lactate, Sepsis    Protime-INR    APTT    Troponin    Urinalysis with Microscopic    Procalcitonin    ELECTROLYTES PLUS    Hemoglobin and hematocrit, blood    CALCIUM, IONIC (POC)    Brain Natriuretic Peptide    Continuous Pulse Oximetry    Inpatient consult to Internal Medicine    Venous Blood Gas, POC    Creatinine W/GFR Point of Care    POCT urea (BUN)    Lactic Acid, POC    POCT Glucose    EKG 12 Lead    ADMIT TO INPATIENT    ADMIT TO INPATIENT       MEDICATIONS ORDERED:  Orders Placed This Encounter   Medications    0.9 % sodium chloride bolus    iopamidol (ISOVUE-370) 76 % injection 75 mL    cefTRIAXone (ROCEPHIN) 1000 mg IVPB in NS 50ml minibag     Order Specific Question:   Antimicrobial Indications     Answer:   Pneumonia (CAP)    azithromycin (ZITHROMAX) 500 mg in dextrose 5% 250 mL IVPB     Order Specific Question:   Antimicrobial Indications     Answer:   Pneumonia (CAP)    cefTRIAXone (ROCEPHIN) 1 g injection     Jason Godwin: cabinet override       DDX: Pneumonia, sepsis, bacteremia, COVID, ACS, influenza, PE, intra-abdominal etiology    DIAGNOSTIC RESULTS / EMERGENCY DEPARTMENT COURSE / MDM   LAB RESULTS:  Results for orders placed or performed during the hospital encounter of 05/05/22   Culture, Blood 1    Specimen: Blood   Result Value Ref Range    Specimen Description . BLOOD     Special Requests R AC 20 ML     Culture NO GROWTH <24 HRS    Culture, Blood 1    Specimen: Blood   Result Value Ref Range    Specimen Description . BLOOD     Special Requests R HAND 10 ML     Culture NO GROWTH <24 HRS    COVID-19, Rapid    Specimen: Nasopharyngeal Swab Result Value Ref Range    Specimen Description . NASOPHARYNGEAL SWAB     SARS-CoV-2, Rapid Not Detected Not Detected   RAPID INFLUENZA A/B ANTIGENS    Specimen: Nasopharyngeal   Result Value Ref Range    Flu A Antigen NEGATIVE NEGATIVE    Flu B Antigen NEGATIVE NEGATIVE   CBC with Auto Differential   Result Value Ref Range    WBC 21.9 (H) 3.5 - 11.3 k/uL    RBC 3.72 (L) 3.95 - 5.11 m/uL    Hemoglobin 13.2 11.9 - 15.1 g/dL    Hematocrit 40.2 36.3 - 47.1 %    .1 (H) 82.6 - 102.9 fL    MCH 35.5 (H) 25.2 - 33.5 pg    MCHC 32.8 28.4 - 34.8 g/dL    RDW 15.6 (H) 11.8 - 14.4 %    Platelets 100 265 - 441 k/uL    MPV 10.2 8.1 - 13.5 fL    NRBC Automated 0.0 0.0 per 100 WBC    Seg Neutrophils 90 (H) 36 - 65 %    Lymphocytes 4 (L) 24 - 43 %    Monocytes 5 3 - 12 %    Eosinophils % 0 (L) 1 - 4 %    Basophils 0 0 - 2 %    Immature Granulocytes 1 (H) 0 %    Segs Absolute 19.68 (H) 1.50 - 8.10 k/uL    Absolute Lymph # 0.95 (L) 1.10 - 3.70 k/uL    Absolute Mono # 1.01 0.10 - 1.20 k/uL    Absolute Eos # <0.03 0.00 - 0.44 k/uL    Basophils Absolute 0.06 0.00 - 0.20 k/uL    Absolute Immature Granulocyte 0.17 0.00 - 0.30 k/uL    RBC Morphology ANISOCYTOSIS PRESENT    Comprehensive Metabolic Panel   Result Value Ref Range    Glucose 102 (H) 70 - 99 mg/dL    BUN 18 8 - 23 mg/dL    CREATININE 0.94 (H) 0.50 - 0.90 mg/dL    Calcium 8.3 (L) 8.6 - 10.4 mg/dL    Sodium 141 135 - 144 mmol/L    Potassium 4.6 3.7 - 5.3 mmol/L    Chloride 107 98 - 107 mmol/L    CO2 24 20 - 31 mmol/L    Anion Gap 10 9 - 17 mmol/L    Alkaline Phosphatase 76 35 - 104 U/L    ALT 12 5 - 33 U/L    AST 39 (H) <32 U/L    Total Bilirubin 0.23 (L) 0.3 - 1.2 mg/dL    Total Protein 5.7 (L) 6.4 - 8.3 g/dL    Albumin 3.1 (L) 3.5 - 5.2 g/dL    Albumin/Globulin Ratio 1.2 1.0 - 2.5    GFR Non- 59 (L) >60 mL/min    GFR African American >60 >60 mL/min    GFR Comment         Lactate, Sepsis   Result Value Ref Range    Lactic Acid, Sepsis, Whole Blood 2.9 (H) 0.5 - 1.9 mmol/L   Protime-INR   Result Value Ref Range    Protime 11.6 9.1 - 12.3 sec    INR 1.1    APTT   Result Value Ref Range    PTT 30.2 20.5 - 30.5 sec   Urinalysis with Microscopic   Result Value Ref Range    Color, UA Yellow Yellow    Turbidity UA Clear Clear    Glucose, Ur 1+ (A) NEGATIVE    Bilirubin Urine NEGATIVE NEGATIVE    Ketones, Urine TRACE (A) NEGATIVE    Specific Gravity, UA 1.027 1.005 - 1.030    Urine Hgb NEGATIVE NEGATIVE    pH, UA 5.5 5.0 - 8.0    Protein, UA TRACE (A) NEGATIVE    Urobilinogen, Urine Normal Normal    Nitrite, Urine NEGATIVE NEGATIVE    Leukocyte Esterase, Urine NEGATIVE NEGATIVE    -          WBC, UA 2 TO 5 0 - 5 /HPF    RBC, UA 2 TO 5 0 - 2 /HPF    Casts UA 5 TO 10 0 - 2 /LPF    Casts UA HYALINE 0 - 2 /LPF    Epithelial Cells UA 2 TO 5 0 - 5 /HPF    Mucus, UA 2+ (A) None   ELECTROLYTES PLUS   Result Value Ref Range    POC Sodium 144 138 - 146 mmol/L    POC Potassium 4.7 (H) 3.5 - 4.5 mmol/L    POC Chloride 108 (H) 98 - 107 mmol/L    POC TCO2 29 22 - 30 mmol/L    Anion Gap 8 7 - 16 mmol/L   Hemoglobin and hematocrit, blood   Result Value Ref Range    POC Hemoglobin 15.3 12.0 - 16.0 g/dL    POC Hematocrit 45 36 - 46 %   CALCIUM, IONIC (POC)   Result Value Ref Range    POC Ionized Calcium 1.23 1.15 - 1.33 mmol/L   Venous Blood Gas, POC   Result Value Ref Range    pH, Zac 7.307 (L) 7.320 - 7.430    pCO2, Zac 55.5 (H) 41.0 - 51.0 mm Hg    pO2, Zac 22.3 (L) 30.0 - 50.0 mm Hg    HCO3, Venous 27.8 22.0 - 29.0 mmol/L    Positive Base Excess, Zac 0 0.0 - 3.0    O2 Sat, Zac 32 (L) 60.0 - 85.0 %   Creatinine W/GFR Point of Care   Result Value Ref Range    POC Creatinine 1.14 0.51 - 1.19 mg/dL    GFR Comment 57 (L) >60 mL/min    GFR Non- 47 (L) >60 mL/min    GFR Comment         POCT urea (BUN)   Result Value Ref Range    POC BUN 22 8 - 26 mg/dL   Lactic Acid, POC   Result Value Ref Range    POC Lactic Acid 2.44 (H) 0.56 - 1.39 mmol/L   POCT Glucose   Result Value Ref Range    POC Glucose 101 (H) 74 - 100 mg/dL       IMPRESSION: 67-year-old female with concerns for pneumonia as patient is satting in the [de-identified] on room air, requiring 15 L nonrebreather, will obtain chest x-ray to evaluate for pneumonia. Will obtain CT of the chest to evaluate for pulmonary embolism. Will obtain CT imaging of the abdomen to evaluate for intra-abdominal etiology. Will obtain sepsis labs, treat with IV fluids, antibiotics, admission. RADIOLOGY:  CT HEAD WO CONTRAST    Result Date: 5/5/2022  EXAMINATION: CT OF THE HEAD WITHOUT CONTRAST  5/5/2022 2:18 pm TECHNIQUE: CT of the head was performed without the administration of intravenous contrast. Dose modulation, iterative reconstruction, and/or weight based adjustment of the mA/kV was utilized to reduce the radiation dose to as low as reasonably achievable. COMPARISON: None. HISTORY: ORDERING SYSTEM PROVIDED HISTORY: AMS TECHNOLOGIST PROVIDED HISTORY: AMS Decision Support Exception - unselect if not a suspected or confirmed emergency medical condition->Emergency Medical Condition (MA) FINDINGS: BRAIN/VENTRICLES: The cerebral hemispheres, brainstem, and cerebellum have a normal appearance for the patient's age. The falx is midline. The ventricles and peripheral sulci are mildly dilated. There is decreased attenuation in the periventricular white matter. There is no sign of a space occupying lesion, infarction, or hemorrhage. Orbits: Portion of the orbits demonstrate no acute abnormality. SINUSES: Mild mucoperiosteal opacification of the ethmoid left sphenoid sinuses. .  The remaining imaged portions of the paranasal sinuses are clear. The mastoids and the middle ear chambers are clear. SOFT TISSUES/SKULL:  No acute abnormality of the visualized skull or soft tissues. Vascular calcifications are seen compatible with atherosclerotic disease. Mild central and cortical cerebral atrophy.  Mild chronic deep white matter ischemic changes No acute intracranial abnormalities are noted. CT ABDOMEN PELVIS W IV CONTRAST Additional Contrast? None    Result Date: 5/5/2022  EXAMINATION: CTA OF THE CHEST; CT OF THE ABDOMEN AND PELVIS WITH CONTRAST 5/5/2022 5:20 pm TECHNIQUE: CTA of the chest was performed after the administration of intravenous contrast.  Multiplanar reformatted images are provided for review. MIP images are provided for review. Dose modulation, iterative reconstruction, and/or weight based adjustment of the mA/kV was utilized to reduce the radiation dose to as low as reasonably achievable.; CT of the abdomen and pelvis was performed with the administration of intravenous contrast. Multiplanar reformatted images are provided for review. Dose modulation, iterative reconstruction, and/or weight based adjustment of the mA/kV was utilized to reduce the radiation dose to as low as reasonably achievable. COMPARISON: CT of the abdomen and pelvis, 04/23/2021 HISTORY: ORDERING SYSTEM PROVIDED HISTORY: rule out PE TECHNOLOGIST PROVIDED HISTORY: rule out PE Decision Support Exception - unselect if not a suspected or confirmed emergency medical condition->Emergency Medical Condition (MA); ORDERING SYSTEM PROVIDED HISTORY: PAIN TECHNOLOGIST PROVIDED HISTORY: PAIN Decision Support Exception - unselect if not a suspected or confirmed emergency medical condition->Emergency Medical Condition (MA) FINDINGS: Chest: Pulmonary arteries: The pulmonary arteries are adequately opacified for evaluation. Evaluation is limited by respiratory motion. There is no evidence of acute pulmonary embolus to the segmental level. The main pulmonary artery is normal in caliber. Mediastinum: There is no mediastinal adenopathy. The heart and pericardium are without acute abnormality. Coronary artery calcifications are present. The thoracic aorta is normal in caliber. Mild atherosclerotic plaque. Lungs/pleura: Respiratory motion slightly limits evaluation.   There are patchy bilateral ground-glass opacities, slightly more prominent in the left upper lobe. No effusion or pneumothorax. Limited evaluation for small pulmonary nodules. Soft Tissues/Bones: Asymmetric soft tissue density in the left breast which measures approximately 3.8 x 2.7 cm. No axillary or supraclavicular lymphadenopathy. No acute bony abnormality. Partially seen cervical fusion hardware. Abdomen/Pelvis: Organs: Mildly motion limited evaluation of the upper abdomen. The liver, spleen, pancreas, kidneys and adrenal glands are without acute findings. The gallbladder is present without radiopaque cholelithiasis. GI/Bowel: No mechanical bowel obstruction. Normal appendix. A few scattered diverticula are present. Pelvis: Urinary bladder is nondistended and not well evaluated. A Blanco catheter is present. Status post hysterectomy. No adnexal mass. Peritoneum/Retroperitoneum: No ascites or pneumoperitoneum. No retroperitoneal or mesenteric lymphadenopathy. Moderate to severe atherosclerotic plaque. There is a 3.1 x 2.7 cm fusiform infrarenal abdominal aortic aneurysm, unchanged from the comparison study. Bones/Soft Tissues: No acute bony abnormality. The bones appear demineralized. 1. Motion limited evaluation with no evidence of acute pulmonary embolus to the segmental level. 2. Patchy bilateral airspace opacities, more prominent on the left. Imaging features can be seen with COVID-19 pneumonia, though are nonspecific and can occur with a variety of infectious and noninfectious processes. PneInd 3. No acute abdominopelvic findings. 4. Unchanged 3.1 cm fusiform infrarenal abdominal aortic aneurysm for which follow-up is recommended in 3 years. 5. Incidental findings including coronary artery disease, moderate to severe atherosclerosis, diverticulosis and osteopenia. RECOMMENDATIONS: 3.1 cm infrarenal abdominal aortic aneurysm. Recommend follow-up every 3 years. Reference: J Am Amber Radiol 2044;11:[id number]. XR CHEST PORTABLE    Result Date: 5/5/2022  EXAMINATION: ONE XRAY VIEW OF THE CHEST 5/5/2022 5:29 pm COMPARISON: 09/14/2020 HISTORY: ORDERING SYSTEM PROVIDED HISTORY: sepsis TECHNOLOGIST PROVIDED HISTORY: sepsis Reason for Exam: supine,hx pneumonia,ams,r/o covid FINDINGS: Monitor wires overlie the patient. Diffuse interstitial opacities are noted bilaterally. No supine evidence of pleural effusion or pneumothorax. The cardiac silhouette and mediastinal contours are stable given differences in patient positioning. No acute bony abnormality. Diffuse bilateral interstitial opacities which may be related to pulmonary edema or atypical/viral infection. CT CHEST PULMONARY EMBOLISM W CONTRAST    Result Date: 5/5/2022  EXAMINATION: CTA OF THE CHEST; CT OF THE ABDOMEN AND PELVIS WITH CONTRAST 5/5/2022 5:20 pm TECHNIQUE: CTA of the chest was performed after the administration of intravenous contrast.  Multiplanar reformatted images are provided for review. MIP images are provided for review. Dose modulation, iterative reconstruction, and/or weight based adjustment of the mA/kV was utilized to reduce the radiation dose to as low as reasonably achievable.; CT of the abdomen and pelvis was performed with the administration of intravenous contrast. Multiplanar reformatted images are provided for review. Dose modulation, iterative reconstruction, and/or weight based adjustment of the mA/kV was utilized to reduce the radiation dose to as low as reasonably achievable.  COMPARISON: CT of the abdomen and pelvis, 04/23/2021 HISTORY: ORDERING SYSTEM PROVIDED HISTORY: rule out PE TECHNOLOGIST PROVIDED HISTORY: rule out PE Decision Support Exception - unselect if not a suspected or confirmed emergency medical condition->Emergency Medical Condition (MA); ORDERING SYSTEM PROVIDED HISTORY: PAIN TECHNOLOGIST PROVIDED HISTORY: PAIN Decision Support Exception - unselect if not a suspected or confirmed emergency medical condition->Emergency Medical Condition (MA) FINDINGS: Chest: Pulmonary arteries: The pulmonary arteries are adequately opacified for evaluation. Evaluation is limited by respiratory motion. There is no evidence of acute pulmonary embolus to the segmental level. The main pulmonary artery is normal in caliber. Mediastinum: There is no mediastinal adenopathy. The heart and pericardium are without acute abnormality. Coronary artery calcifications are present. The thoracic aorta is normal in caliber. Mild atherosclerotic plaque. Lungs/pleura: Respiratory motion slightly limits evaluation. There are patchy bilateral ground-glass opacities, slightly more prominent in the left upper lobe. No effusion or pneumothorax. Limited evaluation for small pulmonary nodules. Soft Tissues/Bones: Asymmetric soft tissue density in the left breast which measures approximately 3.8 x 2.7 cm. No axillary or supraclavicular lymphadenopathy. No acute bony abnormality. Partially seen cervical fusion hardware. Abdomen/Pelvis: Organs: Mildly motion limited evaluation of the upper abdomen. The liver, spleen, pancreas, kidneys and adrenal glands are without acute findings. The gallbladder is present without radiopaque cholelithiasis. GI/Bowel: No mechanical bowel obstruction. Normal appendix. A few scattered diverticula are present. Pelvis: Urinary bladder is nondistended and not well evaluated. A Blanco catheter is present. Status post hysterectomy. No adnexal mass. Peritoneum/Retroperitoneum: No ascites or pneumoperitoneum. No retroperitoneal or mesenteric lymphadenopathy. Moderate to severe atherosclerotic plaque. There is a 3.1 x 2.7 cm fusiform infrarenal abdominal aortic aneurysm, unchanged from the comparison study. Bones/Soft Tissues: No acute bony abnormality. The bones appear demineralized. 1. Motion limited evaluation with no evidence of acute pulmonary embolus to the segmental level.  2. Patchy bilateral airspace opacities, more prominent on the left. Imaging features can be seen with COVID-19 pneumonia, though are nonspecific and can occur with a variety of infectious and noninfectious processes. PneInd 3. No acute abdominopelvic findings. 4. Unchanged 3.1 cm fusiform infrarenal abdominal aortic aneurysm for which follow-up is recommended in 3 years. 5. Incidental findings including coronary artery disease, moderate to severe atherosclerosis, diverticulosis and osteopenia. RECOMMENDATIONS: 3.1 cm infrarenal abdominal aortic aneurysm. Recommend follow-up every 3 years. Reference: J Am Amber Radiol [id number]. EKG  EKG Interpretation    Interpreted by me    Rhythm: normal sinus   Rate: normal  Axis: normal  Ectopy: none  Conduction: normal  ST Segments: no acute change  T Waves: no acute change  Q Waves: none    Clinical Impression: no acute changes and normal EKG    All EKG's are interpreted by the Emergency Department Physician who either signs or Co-signs this chart in the absence of a cardiologist.    EMERGENCY DEPARTMENT COURSE:  Patient came to emergency department, HPI and physical exam were conducted. All nursing notes were reviewed. Chest x-ray consistent with pneumonia. CT imaging found no evidence of pulmonary embolism, was consistent with pneumonia. Administered IV fluids, obtained all sepsis labs, started on broad-spectrum antibiotics. Patient is maintaining airway, no indication for ICU admission at this time. Admitted patient to internal medicine for further management of patient's pneumonia. No notes of EC Admission Criteria type on file. PROCEDURES:      CONSULTS:  IP CONSULT TO INTERNAL MEDICINE    CRITICAL CARE:      FINAL IMPRESSION      1. Pneumonia of both lungs due to infectious organism, unspecified part of lung    2.  Septicemia ([facility] [address]          DISPOSITION / PLAN     DISPOSITION Admitted 05/05/2022 06:35:36 PM      PATIENT REFERRED TO:  No follow-up provider specified.     DISCHARGE MEDICATIONS:  New Prescriptions    No medications on file       Kirill Izquierdo MD  Emergency Medicine Resident    (Please note that portions of thisnote were completed with a voice recognition program.  Efforts were made to edit the dictations but occasionally words are mis-transcribed.)        Kirill Izquierdo MD  Resident  05/05/22 1081       Kirill Izquierdo MD  Resident  05/05/22 4014

## 2022-05-05 NOTE — ED NOTES
Family spoke with this RN and verbalized new swelling to right side of throat and mucousy appearance on tongue, new onset  Patient repositioned, siderails up x2     Eron Love RN  05/05/22 1935

## 2022-05-05 NOTE — ED NOTES
80year old female presents from home  Not acting appropriate  Answers name but no other questions, does not follow commands  Moving self in bed  Unable to follow commands for neuro exam  Patient placed on cardiac monitor, BP cuff and pulse ox. Alarms set.        Olga Cordova RN  05/05/22 [id number]

## 2022-05-05 NOTE — ACP (ADVANCE CARE PLANNING)
Advance Care Planning     Advance Care Planning Activator (Inpatient)  Conversation Note      Date of ACP Conversation: 5/5/2022     Conversation Conducted with: Patient with Decision Making Capacity    ACP Activator: Vinayak Mahajan RN        Health Care Decision Maker:     Current Designated Health Care Decision Maker:     Click here to complete Healthcare Decision Makers including section of the Healthcare Decision Maker Relationship (ie \"Primary\")      Care Preferences    Ventilation: \"If you were in your present state of health and suddenly became very ill and were unable to breathe on your own, what would your preference be about the use of a ventilator (breathing machine) if it were available to you? \"      Would the patient desire the use of ventilator (breathing machine)?: yes    \"If your health worsens and it becomes clear that your chance of recovery is unlikely, what would your preference be about the use of a ventilator (breathing machine) if it were available to you? \"     Would the patient desire the use of ventilator (breathing machine)?: Yes      Resuscitation  \"CPR works best to restart the heart when there is a sudden event, like a heart attack, in someone who is otherwise healthy. Unfortunately, CPR does not typically restart the heart for people who have serious health conditions or who are very sick. \"    \"In the event your heart stopped as a result of an underlying serious health condition, would you want attempts to be made to restart your heart (answer \"yes\" for attempt to resuscitate) or would you prefer a natural death (answer \"no\" for do not attempt to resuscitate)? \" yes       [] Yes   [] No   Educated Patient / Tracee Maurer regarding differences between Advance Directives and portable DNR orders.     Length of ACP Conversation in minutes:      Conversation Outcomes:  [x] ACP discussion completed  [] Existing advance directive reviewed with patient; no changes to patient's previously recorded wishes  [] New Advance Directive completed  [] Portable Do Not Rescitate prepared for Provider review and signature  [] POLST/POST/MOLST/MOST prepared for Provider review and signature      Follow-up plan:    [] Schedule follow-up conversation to continue planning  [] Referred individual to Provider for additional questions/concerns   [] Advised patient/agent/surrogate to review completed ACP document and update if needed with changes in condition, patient preferences or care setting    [] This note routed to one or more involved healthcare providers

## 2022-05-05 NOTE — CARE COORDINATION
Case Management Initial Discharge Plan  Armani Christie,             Met with:patient or family member patient and grand daughter to discuss discharge plans. Information verified: address, contacts, phone number, , insurance Yes  Insurance Provider: Akhil Gee    Emergency Contact/Next of Kin name & number:   Katlyn Zavala [phone]       Who are involved in patient's support system? , grand daughter    PCP: Dwayne Scott MD  Date of last visit: grand daughter is unsure      Discharge Planning    Living Arrangements:        Home has 2 stories  3 stairs to climb to get into front door, 13stairs to climb to reach second floor  Location of bedroom/bathroom in home upper    Patient able to perform ADL's:Independent    Current Services (outpatient & in home) none  DME equipment: wheel chair, walker, cane,shower chair,home 02  DME provider:     Is patient receiving oral anticoagulation therapy? No    If indicated:   Physician managing anticoagulation treatment: n/a  Where does patient obtain lab work for ATC treatment? n/a    Does patient have any issues/concerns obtaining medications? No  If yes, what are patient's concerns? Is there a preferred Pharmacy after hours or on weekends? No    If yes, which pharmacy? Potential Assistance Needed:       Patient agreeable to home care: No  [place] of choice provided:  n/a    Prior SNF/Rehab Placement and Facility: none  Agreeable to SNF/Rehab: No  [place] of choice provided: n/a     Evaluation: no    Expected Discharge date:       Patient expects to be discharged to: If home: is the family and/or caregiver wiling & able to provide support at home? yes  Who will be providing this support?  Grand daughter helps, *    Follow Up Appointment: Best Day/ Time:      Transportation provider: family  Transportation arrangements needed for discharge: No    Readmission Risk              Risk of Unplanned Readmission:  0             Does patient have a readmission risk score greater than 14?: n/a  If yes, follow-up appointment must be made within 7 days of discharge.      Goals of Care: get well again      Educated pt on transitional options, provided freedom of choice and are agreeable with plan      Discharge Plan: Granddaughter states pt sleeps upstairs to get away from the bed dolores,pcp established, has transportation          Electronically signed by Gerardo Hernandez RN on 5/5/22 at 7:28 PM EDT

## 2022-05-06 PROBLEM — E87.20 LACTIC ACIDEMIA: Status: ACTIVE | Noted: 2022-05-06

## 2022-05-06 PROBLEM — D75.89 MACROCYTOSIS WITHOUT ANEMIA: Status: ACTIVE | Noted: 2022-05-06

## 2022-05-06 PROBLEM — J96.01 PUERPERAL SEPSIS WITH ACUTE HYPOXIC RESPIRATORY FAILURE (HCC): Status: ACTIVE | Noted: 2022-05-06

## 2022-05-06 PROBLEM — R65.20 PUERPERAL SEPSIS WITH ACUTE HYPOXIC RESPIRATORY FAILURE (HCC): Status: ACTIVE | Noted: 2022-05-06

## 2022-05-06 PROBLEM — I50.33 ACUTE ON CHRONIC CONGESTIVE HEART FAILURE WITH LEFT VENTRICULAR DIASTOLIC DYSFUNCTION (HCC): Status: ACTIVE | Noted: 2022-05-06

## 2022-05-06 LAB
ADENOVIRUS PCR: NOT DETECTED
ANION GAP SERPL CALCULATED.3IONS-SCNC: 10 MMOL/L (ref 9–17)
BORDETELLA PARAPERTUSSIS: NOT DETECTED
BORDETELLA PERTUSSIS PCR: NOT DETECTED
BUN BLDV-MCNC: 19 MG/DL (ref 8–23)
CALCIUM SERPL-MCNC: 8.1 MG/DL (ref 8.6–10.4)
CARBOXYHEMOGLOBIN: 1.2 % (ref 0–5)
CHLAMYDIA PNEUMONIAE BY PCR: NOT DETECTED
CHLORIDE BLD-SCNC: 110 MMOL/L (ref 98–107)
CO2: 20 MMOL/L (ref 20–31)
CORONAVIRUS 229E PCR: NOT DETECTED
CORONAVIRUS HKU1 PCR: NOT DETECTED
CORONAVIRUS NL63 PCR: NOT DETECTED
CORONAVIRUS OC43 PCR: NOT DETECTED
CREAT SERPL-MCNC: 0.64 MG/DL (ref 0.5–0.9)
CULTURE: NO GROWTH
EKG ATRIAL RATE: 95 BPM
EKG P AXIS: 52 DEGREES
EKG P-R INTERVAL: 178 MS
EKG Q-T INTERVAL: 354 MS
EKG QRS DURATION: 74 MS
EKG QTC CALCULATION (BAZETT): 444 MS
EKG R AXIS: 94 DEGREES
EKG T AXIS: 62 DEGREES
EKG VENTRICULAR RATE: 95 BPM
FIO2: ABNORMAL
GFR AFRICAN AMERICAN: >60 ML/MIN
GFR NON-AFRICAN AMERICAN: >60 ML/MIN
GFR SERPL CREATININE-BSD FRML MDRD: ABNORMAL ML/MIN/{1.73_M2}
GLUCOSE BLD-MCNC: 101 MG/DL (ref 70–99)
HCO3 VENOUS: 23 MMOL/L (ref 24–30)
HCT VFR BLD CALC: 37 % (ref 36.3–47.1)
HEMOGLOBIN: 12.3 G/DL (ref 11.9–15.1)
HUMAN METAPNEUMOVIRUS PCR: NOT DETECTED
INFLUENZA A BY PCR: NOT DETECTED
INFLUENZA B BY PCR: NOT DETECTED
LACTIC ACID, SEPSIS WHOLE BLOOD: 1.2 MMOL/L (ref 0.5–1.9)
MCH RBC QN AUTO: 34.9 PG (ref 25.2–33.5)
MCHC RBC AUTO-ENTMCNC: 33.2 G/DL (ref 28.4–34.8)
MCV RBC AUTO: 105.1 FL (ref 82.6–102.9)
MYCOPLASMA PNEUMONIAE PCR: NOT DETECTED
NEGATIVE BASE EXCESS, VEN: 1.9 MMOL/L (ref 0–2)
NRBC AUTOMATED: 0 PER 100 WBC
O2 SAT, VEN: 77 % (ref 60–85)
PARAINFLUENZA 1 PCR: NOT DETECTED
PARAINFLUENZA 2 PCR: NOT DETECTED
PARAINFLUENZA 3 PCR: NOT DETECTED
PARAINFLUENZA 4 PCR: NOT DETECTED
PARTIAL THROMBOPLASTIN TIME: 40.1 SEC (ref 20.5–30.5)
PARTIAL THROMBOPLASTIN TIME: 40.9 SEC (ref 20.5–30.5)
PARTIAL THROMBOPLASTIN TIME: 55.8 SEC (ref 20.5–30.5)
PATIENT TEMP: 37
PCO2, VEN: 41.8 (ref 39–55)
PDW BLD-RTO: 15.5 % (ref 11.8–14.4)
PH VENOUS: 7.36 (ref 7.32–7.42)
PLATELET # BLD: 250 K/UL (ref 138–453)
PMV BLD AUTO: 9.7 FL (ref 8.1–13.5)
PO2, VEN: 43.3 (ref 30–50)
POTASSIUM SERPL-SCNC: 4.1 MMOL/L (ref 3.7–5.3)
RBC # BLD: 3.52 M/UL (ref 3.95–5.11)
RESP SYNCYTIAL VIRUS PCR: NOT DETECTED
RHINO/ENTEROVIRUS PCR: NOT DETECTED
SARS-COV-2, PCR: NOT DETECTED
SODIUM BLD-SCNC: 140 MMOL/L (ref 135–144)
SPECIMEN DESCRIPTION: NORMAL
SPECIMEN DESCRIPTION: NORMAL
TROPONIN, HIGH SENSITIVITY: 453 NG/L (ref 0–14)
TROPONIN, HIGH SENSITIVITY: 553 NG/L (ref 0–14)
WBC # BLD: 16.5 K/UL (ref 3.5–11.3)

## 2022-05-06 PROCEDURE — 84484 ASSAY OF TROPONIN QUANT: CPT

## 2022-05-06 PROCEDURE — 94660 CPAP INITIATION&MGMT: CPT

## 2022-05-06 PROCEDURE — 2580000003 HC RX 258: Performed by: STUDENT IN AN ORGANIZED HEALTH CARE EDUCATION/TRAINING PROGRAM

## 2022-05-06 PROCEDURE — 94761 N-INVAS EAR/PLS OXIMETRY MLT: CPT

## 2022-05-06 PROCEDURE — 6370000000 HC RX 637 (ALT 250 FOR IP): Performed by: STUDENT IN AN ORGANIZED HEALTH CARE EDUCATION/TRAINING PROGRAM

## 2022-05-06 PROCEDURE — 97116 GAIT TRAINING THERAPY: CPT

## 2022-05-06 PROCEDURE — 36415 COLL VENOUS BLD VENIPUNCTURE: CPT

## 2022-05-06 PROCEDURE — 82805 BLOOD GASES W/O2 SATURATION: CPT

## 2022-05-06 PROCEDURE — 2700000000 HC OXYGEN THERAPY PER DAY

## 2022-05-06 PROCEDURE — 2060000000 HC ICU INTERMEDIATE R&B

## 2022-05-06 PROCEDURE — 85730 THROMBOPLASTIN TIME PARTIAL: CPT

## 2022-05-06 PROCEDURE — 99222 1ST HOSP IP/OBS MODERATE 55: CPT | Performed by: INTERNAL MEDICINE

## 2022-05-06 PROCEDURE — 80048 BASIC METABOLIC PNL TOTAL CA: CPT

## 2022-05-06 PROCEDURE — 97166 OT EVAL MOD COMPLEX 45 MIN: CPT

## 2022-05-06 PROCEDURE — 83605 ASSAY OF LACTIC ACID: CPT

## 2022-05-06 PROCEDURE — 51702 INSERT TEMP BLADDER CATH: CPT

## 2022-05-06 PROCEDURE — 97535 SELF CARE MNGMENT TRAINING: CPT

## 2022-05-06 PROCEDURE — 85027 COMPLETE CBC AUTOMATED: CPT

## 2022-05-06 PROCEDURE — 6360000002 HC RX W HCPCS: Performed by: STUDENT IN AN ORGANIZED HEALTH CARE EDUCATION/TRAINING PROGRAM

## 2022-05-06 PROCEDURE — 0202U NFCT DS 22 TRGT SARS-COV-2: CPT

## 2022-05-06 PROCEDURE — 94640 AIRWAY INHALATION TREATMENT: CPT

## 2022-05-06 PROCEDURE — 97162 PT EVAL MOD COMPLEX 30 MIN: CPT

## 2022-05-06 RX ORDER — IPRATROPIUM BROMIDE AND ALBUTEROL SULFATE 2.5; .5 MG/3ML; MG/3ML
1 SOLUTION RESPIRATORY (INHALATION)
Status: DISCONTINUED | OUTPATIENT
Start: 2022-05-06 | End: 2022-05-10 | Stop reason: HOSPADM

## 2022-05-06 RX ORDER — METHYLPREDNISOLONE SODIUM SUCCINATE 40 MG/ML
40 INJECTION, POWDER, LYOPHILIZED, FOR SOLUTION INTRAMUSCULAR; INTRAVENOUS EVERY 8 HOURS
Status: DISCONTINUED | OUTPATIENT
Start: 2022-05-06 | End: 2022-05-08

## 2022-05-06 RX ORDER — FUROSEMIDE 10 MG/ML
20 INJECTION INTRAMUSCULAR; INTRAVENOUS ONCE
Status: COMPLETED | OUTPATIENT
Start: 2022-05-06 | End: 2022-05-06

## 2022-05-06 RX ORDER — IBUPROFEN 400 MG/1
400 TABLET ORAL EVERY 6 HOURS PRN
Status: DISCONTINUED | OUTPATIENT
Start: 2022-05-06 | End: 2022-05-10 | Stop reason: HOSPADM

## 2022-05-06 RX ORDER — ASPIRIN 81 MG/1
81 TABLET, CHEWABLE ORAL DAILY
Status: DISCONTINUED | OUTPATIENT
Start: 2022-05-06 | End: 2022-05-10 | Stop reason: HOSPADM

## 2022-05-06 RX ADMIN — ACETAMINOPHEN 650 MG: 325 TABLET ORAL at 16:34

## 2022-05-06 RX ADMIN — PIPERACILLIN SODIUM AND TAZOBACTAM SODIUM 3375 MG: 2; .25 INJECTION, POWDER, LYOPHILIZED, FOR SOLUTION INTRAVENOUS at 05:11

## 2022-05-06 RX ADMIN — IPRATROPIUM BROMIDE AND ALBUTEROL SULFATE 1 AMPULE: .5; 3 SOLUTION RESPIRATORY (INHALATION) at 16:04

## 2022-05-06 RX ADMIN — SODIUM CHLORIDE, PRESERVATIVE FREE 10 ML: 5 INJECTION INTRAVENOUS at 19:49

## 2022-05-06 RX ADMIN — SODIUM CHLORIDE, PRESERVATIVE FREE 10 ML: 5 INJECTION INTRAVENOUS at 08:43

## 2022-05-06 RX ADMIN — BUDESONIDE AND FORMOTEROL FUMARATE DIHYDRATE 2 PUFF: 160; 4.5 AEROSOL RESPIRATORY (INHALATION) at 21:43

## 2022-05-06 RX ADMIN — IPRATROPIUM BROMIDE AND ALBUTEROL SULFATE 1 AMPULE: .5; 3 SOLUTION RESPIRATORY (INHALATION) at 12:00

## 2022-05-06 RX ADMIN — IPRATROPIUM BROMIDE AND ALBUTEROL SULFATE 1 AMPULE: .5; 3 SOLUTION RESPIRATORY (INHALATION) at 21:43

## 2022-05-06 RX ADMIN — IPRATROPIUM BROMIDE AND ALBUTEROL SULFATE 1 AMPULE: .5; 3 SOLUTION RESPIRATORY (INHALATION) at 10:06

## 2022-05-06 RX ADMIN — METHYLPREDNISOLONE SODIUM SUCCINATE 40 MG: 40 INJECTION, POWDER, FOR SOLUTION INTRAMUSCULAR; INTRAVENOUS at 12:45

## 2022-05-06 RX ADMIN — METHYLPREDNISOLONE SODIUM SUCCINATE 40 MG: 40 INJECTION, POWDER, FOR SOLUTION INTRAMUSCULAR; INTRAVENOUS at 19:49

## 2022-05-06 RX ADMIN — BUDESONIDE AND FORMOTEROL FUMARATE DIHYDRATE 2 PUFF: 160; 4.5 AEROSOL RESPIRATORY (INHALATION) at 09:27

## 2022-05-06 RX ADMIN — HEPARIN SODIUM 2000 UNITS: 1000 INJECTION INTRAVENOUS; SUBCUTANEOUS at 20:22

## 2022-05-06 RX ADMIN — BUSPIRONE HYDROCHLORIDE 7.5 MG: 5 TABLET ORAL at 19:50

## 2022-05-06 RX ADMIN — HEPARIN SODIUM 2000 UNITS: 1000 INJECTION INTRAVENOUS; SUBCUTANEOUS at 12:43

## 2022-05-06 RX ADMIN — FUROSEMIDE 20 MG: 10 INJECTION, SOLUTION INTRAMUSCULAR; INTRAVENOUS at 12:54

## 2022-05-06 ASSESSMENT — ENCOUNTER SYMPTOMS
ABDOMINAL DISTENTION: 0
NAUSEA: 0
SHORTNESS OF BREATH: 0
DIARRHEA: 0
CONSTIPATION: 0
CHOKING: 0
BLOOD IN STOOL: 0
VOMITING: 0
COUGH: 0
ABDOMINAL PAIN: 0
STRIDOR: 0
WHEEZING: 0
APNEA: 0

## 2022-05-06 ASSESSMENT — PAIN SCALES - GENERAL
PAINLEVEL_OUTOF10: 5
PAINLEVEL_OUTOF10: 0
PAINLEVEL_OUTOF10: 8
PAINLEVEL_OUTOF10: 1
PAINLEVEL_OUTOF10: 0
PAINLEVEL_OUTOF10: 1

## 2022-05-06 ASSESSMENT — PAIN - FUNCTIONAL ASSESSMENT
PAIN_FUNCTIONAL_ASSESSMENT: ACTIVITIES ARE NOT PREVENTED
PAIN_FUNCTIONAL_ASSESSMENT: PREVENTS OR INTERFERES SOME ACTIVE ACTIVITIES AND ADLS
PAIN_FUNCTIONAL_ASSESSMENT: ACTIVITIES ARE NOT PREVENTED

## 2022-05-06 ASSESSMENT — PAIN DESCRIPTION - DESCRIPTORS
DESCRIPTORS: ACHING
DESCRIPTORS: ACHING

## 2022-05-06 ASSESSMENT — PAIN DESCRIPTION - LOCATION
LOCATION: GENERALIZED
LOCATION: BACK

## 2022-05-06 NOTE — CONSULTS
Attestation signed by      Attending Physician Statement:    I have discussed the care of  Mario Navas , including pertinent history and exam findings, with the Cardiology fellow/resident. I have seen and examined the patient and the key elements of all parts of the encounter have been performed by me. I agree with the assessment, plan and orders as documented by the fellow/resident, after I modified exam findings and plan of treatments, and the final version is my approved version of the assessment. Additional Comments:   80-year-old pleasant female came with pleuritic chest pain and atypical chest pain found to have pneumonia being treated her tropes are quite elevated. No EKG changes WBCs counts are going down  Plan is to do the heart cath on Monday once patient is able to lay flat. Continue continue heparin drip   [facility] Cardiology Cardiology    Consult / H&P               Today's Date: 5/6/2022  Patient Name: Mario Navas  Date of admission: 5/5/2022  4:24 PM  Patient's age: 80 y. o., 1950  Admission Dx: Pneumonia [J18.9]  Septicemia ([facility] [address] [A41.9]  Pneumonia of both lungs due to infectious organism, unspecified part of lung [J18.9]    Reason for Consult:  Cardiac evaluation    Requesting Physician: Lannie Boast, MD    CHIEF COMPLAINT:  Altered mental status    History Obtained From:  patient, electronic medical record    HISTORY OF PRESENT ILLNESS:      The patient is a 80 y.o. female initially admitted for altered mental status. Patient initially found to be altered by her  who called EMS who found the patient satting in the low 80s started on oxygen and transferred to [facility] ER. As per family patient had been having symptoms consistent with pneumonia for the past couple of weeks and had been on some antibiotics. Patient does have a history of CAD but not known if she has had stents in the past.  Cardiology consulted for NSTEMI.   Patient did report On/Off sharp mid sternal chest pains for the past two weeks. Past Medical History:   has a past medical history of Acid reflux, Anxiety and depression, Asthma, CAD (coronary artery disease), CHF (congestive heart failure) ([facility] [address] COPD (chronic obstructive pulmonary disease) ([address] Hyperlipidemia, Hypertension, IBS (irritable bowel syndrome), MI (myocardial infarction) ([address] On home O2, Osteoarthritis, Peptic ulcer, and PNA (pneumonia). Past Surgical History:   has a past surgical history that includes cervical fusion; Tonsillectomy; Cardiac catheterization (2017); Upper gastrointestinal endoscopy (N/A, 7/31/2020); Colonoscopy (N/A, 7/31/2020); and Upper gastrointestinal endoscopy (N/A, 6/24/2021). Home Medications:    Prior to Admission medications    Medication Sig Start Date End Date Taking? Authorizing Provider   predniSONE (DELTASONE) 10 MG tablet 3 TABS PO DAILY FOR 3 DAYS, THEN 2 TABS PO DAILY FOR 3 DAYS ,THEN 1 TAB PO DAILY FOR 3 DAYS 1/14/21   Historical Provider, MD   NONFORMULARY Pt uses O2 3L NC prn    Historical Provider, MD   metOLazone (ZAROXOLYN) 2.5 MG tablet  9/17/20   Historical Provider, MD   cyclobenzaprine (FLEXERIL) 5 MG tablet TAKE 1 TABLET BY MOUTH THREE TIMES A DAY AS NEEDED 9/3/20   Historical Provider, MD   albuterol (PROVENTIL) (2.5 MG/3ML) 0.083% nebulizer solution Take 3 mLs by nebulization every 6 hours as needed for Wheezing 6/1/19   Bakari Mckinney MD   gabapentin (NEURONTIN) 600 MG tablet Take 1 tablet by mouth 3 times daily for 30 days.  6/1/19 6/24/21  Bakari Mckinney MD   docusate sodium (COLACE) 100 MG capsule Take 1 capsule by mouth 2 times daily as needed for Constipation 6/1/19   Flori Phelps MD   varenicline (CHANTIX) 0.5 MG tablet Take 1-2 tablets by mouth See Admin Instructions 0.5mg DAILY for 3 days followed by 0.5mg TWICE DAILY for 4 days followed by 1mg TWICE DAILY 6/1/19   Bakari Mckinney MD   atorvastatin (LIPITOR) 40 MG tablet Take 40 mg by mouth daily Historical Provider, MD   diclofenac sodium 1 % GEL Apply 4 g topically 3 times daily To knees, hips, spine, neck, arms    Historical Provider, MD   sertraline (ZOLOFT) 25 MG tablet Take 25 mg by mouth daily    Historical Provider, MD   tiotropium (SPIRIVA RESPIMAT) 2.5 MCG/ACT AERS inhaler Inhale 2 puffs into the lungs daily    Historical Provider, MD   oxyCODONE-acetaminophen (PERCOCET) 5-325 MG per tablet Take 1 tablet by mouth every 8 hours as needed for Pain.     Historical Provider, MD   busPIRone (BUSPAR) 7.5 MG tablet Take 7.5 mg by mouth 2 times daily     Historical Provider, MD   fluticasone (FLONASE) 50 MCG/ACT nasal spray 1 spray by Each Nostril route daily    Historical Provider, MD   furosemide (LASIX) 40 MG tablet Take 40 mg by mouth daily    Historical Provider, MD   isosorbide mononitrate (IMDUR) 30 MG extended release tablet Take 30 mg by mouth daily    Historical Provider, MD   metoprolol succinate (TOPROL XL) 25 MG extended release tablet Take 25 mg by mouth daily    Historical Provider, MD   montelukast (SINGULAIR) 10 MG tablet Take 10 mg by mouth every morning     Historical Provider, MD   omeprazole (PRILOSEC) 40 MG delayed release capsule Take 40 mg by mouth daily     Historical Provider, MD   budesonide-formoterol (SYMBICORT) 160-4.5 MCG/ACT AERO Inhale 2 puffs into the lungs 2 times daily    Historical Provider, MD   amitriptyline (ELAVIL) 25 MG tablet Take 25 mg by mouth nightly as needed for Sleep     Historical Provider, MD   Multiple Vitamins-Minerals (THERAPEUTIC MULTIVITAMIN-MINERALS) tablet Take 1 tablet by mouth daily    Historical Provider, MD      Current Facility-Administered Medications: ipratropium-albuterol (DUONEB) nebulizer solution 1 ampule, 1 ampule, Inhalation, Q4H WA  piperacillin-tazobactam (ZOSYN) 3,375 mg in sodium chloride 0.9 % 50 mL IVPB (mini-bag), 3,375 mg, IntraVENous, Q8H  furosemide (LASIX) injection 20 mg, 20 mg, IntraVENous, Once  methylPREDNISolone sodium (SOLU-MEDROL) injection 40 mg, 40 mg, IntraVENous, Q8H  aspirin chewable tablet 81 mg, 81 mg, Oral, Daily  albuterol (PROVENTIL) nebulizer solution 2.5 mg, 2.5 mg, Nebulization, Q6H PRN  budesonide-formoterol (SYMBICORT) 160-4.5 MCG/ACT inhaler 2 puff, 2 puff, Inhalation, BID  atorvastatin (LIPITOR) tablet 40 mg, 40 mg, Oral, Daily  busPIRone (BUSPAR) tablet 7.5 mg, 7.5 mg, Oral, BID  docusate sodium (COLACE) capsule 100 mg, 100 mg, Oral, BID PRN  sodium chloride flush 0.9 % injection 5-40 mL, 5-40 mL, IntraVENous, 2 times per day  sodium chloride flush 0.9 % injection 5-40 mL, 5-40 mL, IntraVENous, PRN  0.9 % sodium chloride infusion, , IntraVENous, PRN  ondansetron (ZOFRAN-ODT) disintegrating tablet 4 mg, 4 mg, Oral, Q8H PRN **OR** ondansetron (ZOFRAN) injection 4 mg, 4 mg, IntraVENous, Q6H PRN  polyethylene glycol (GLYCOLAX) packet 17 g, 17 g, Oral, Daily PRN  acetaminophen (TYLENOL) tablet 650 mg, 650 mg, Oral, Q6H PRN **OR** acetaminophen (TYLENOL) suppository 650 mg, 650 mg, Rectal, Q6H PRN  heparin (porcine) injection 4,000 Units, 4,000 Units, IntraVENous, PRN  heparin (porcine) injection 2,000 Units, 2,000 Units, IntraVENous, PRN  heparin 25,000 units in 0.9% sodium chloride 250 mL infusion, 5-30 Units/kg/hr, IntraVENous, Continuous    Allergies:  Sulfa antibiotics    Social History:   reports that she has been smoking cigarettes. She has a 10.00 pack-year smoking history. She has never used smokeless tobacco. She reports that she does not drink alcohol and does not use drugs. Family History: family history is not on file. REVIEW OF SYSTEMS:    · Constitutional: there has been no unanticipated weight loss. There's been No change in energy level, No change in activity level. · Eyes: No visual changes or diplopia. No scleral icterus. · ENT: No Headaches  · Cardiovascular: Chest pain. · Respiratory: No previous pulmonary problems, No cough  · Gastrointestinal: No abdominal pain.   No change in bowel or bladder habits. · Genitourinary: No dysuria, trouble voiding, or hematuria. · Musculoskeletal:  No gait disturbance, No weakness or joint complaints. · Integumentary: No rash or pruritis. · Neurological: No headache, diplopia, change in muscle strength, numbness or tingling. No change in gait, balance, coordination, mood, affect, memory, mentation, behavior. · Psychiatric: No anxiety, or depression. · Endocrine: No temperature intolerance. No excessive thirst, fluid intake, or urination. No tremor. · Hematologic/Lymphatic: No abnormal bruising or bleeding, blood clots or swollen lymph nodes. · Allergic/Immunologic: No nasal congestion or hives. PHYSICAL EXAM:      /71   Pulse 84   Temp 97.4 °F (36.3 °C) (Axillary)   Resp 19   Ht 5' 3\" (1.6 m)   Wt 163 lb (73.9 kg)   SpO2 96%   BMI 28.87 kg/m²    Constitutional and General Appearance: alert, cooperative, no distress and appears stated age  HEENT: PERRL, no cervical lymphadenopathy. No masses palpable. Normal oral mucosa  Respiratory:  · Normal excursion and expansion without use of accessory muscles  · Resp Auscultation: Good respiratory effort. No for increased work of breathing. On auscultation: clear to auscultation bilaterally  Cardiovascular:  · The apical impulse is not displaced  · Heart tones are crisp and normal. regular S1 and S2. Abdomen:   · No masses or tenderness  · Bowel sounds present  Extremities:  ·  No Cyanosis or Clubbing  ·  Lower extremity edema: No  ·  Skin: Warm and dry  Neurological:  · Alert and oriented.   · Moves all extremities well  · No abnormalities of mood, affect, memory, mentation, or behavior are noted    Labs:     CBC:   Recent Labs     05/05/22  1734 05/06/22  0042   WBC 21.9* 16.5*   HGB 13.2 12.3   HCT 40.2 37.0    250     BMP:   Recent Labs     05/05/22  1637 05/05/22  1734   NA  --  141   K  --  4.6   CO2  --  24   BUN  --  18   CREATININE 1.14 0.94*   LABGLOM 47* 59*   GLUCOSE  -- 102*     BNP: No results for input(s): BNP in the last 72 hours. PT/INR:   Recent Labs     05/05/22  1734   PROTIME 11.6   INR 1.1     APTT:  Recent Labs     05/05/22  2209 05/06/22  0357   APTT 29.1 55.8*     CARDIAC ENZYMES:No results for input(s): CKTOTAL, CKMB, CKMBINDEX, TROPONINI in the last 72 hours. FASTING LIPID PANEL:No results found for: HDL, LDLDIRECT, LDLCALC, TRIG  LIVER PROFILE:  Recent Labs     05/05/22  1734   AST 39*   ALT 12   LABALBU 3.1*       DATA:    Diagnostics:    EKG: No specific changes. ECHO: Ordered. IMPRESSION:    1. NSTEMi likely type 2 secondary to sepsis due to pneumonia. Elevated troponin with a flat trend. Likely underlying ischemic heart disease. 2.  Bilateral airspace opacities consistent with pneumonia on CT chest PE.  3. Fusiform Abdominal aortic aneurysm 3.8 cm.   4. History of CAD reportedly, no records found in EMR. 5. Coronary artery calcifications on CT chest.    RECOMMENDATIONS:  1. Continue statin and heparin drip. We will add aspirin. 2. Agree with echocardiogram.  3. Start diuretics daily. 4. Patient will need ischemic work-up likely cardiac cath when acute issues resolved. 5. Will continue to follow. Discussed with patient and Nurse.     Kalina Garcia MD       Cardiovascular Fellow PGY-4  5/6/2022, 10:46 AM

## 2022-05-06 NOTE — PROGRESS NOTES
Physical Therapy  Facility/Department: [address] STEPDOWN  Physical Therapy Initial Assessment    Name: Cindi Kelly  : 1950  MRN: [id number]  Date of Service: 2022  Chief Complaint   Patient presents with    Altered Mental Status      Discharge Recommendations:  Patient would benefit from continued therapy after discharge   PT Equipment Recommendations  Equipment Needed: No  Other: Pt reports owning a rollator, SPC, writer recommends  use of rollator. Patient Diagnosis(es): The primary encounter diagnosis was Pneumonia of both lungs due to infectious organism, unspecified part of lung. A diagnosis of Septicemia ([address] [address] was also pertinent to this visit. Past Medical History:  has a past medical history of Acid reflux, Anxiety and depression, Asthma, CAD (coronary artery disease), CHF (congestive heart failure) ([facility] [address] COPD (chronic obstructive pulmonary disease) ([facility] [address] Hyperlipidemia, Hypertension, IBS (irritable bowel syndrome), MI (myocardial infarction) ([facility] [address] On home O2, Osteoarthritis, Peptic ulcer, and PNA (pneumonia). Past Surgical History:  has a past surgical history that includes cervical fusion; Tonsillectomy; Cardiac catheterization (2017); Upper gastrointestinal endoscopy (N/A, 2020); Colonoscopy (N/A, 2020); and Upper gastrointestinal endoscopy (N/A, 2021). Assessment   Body Structures, Functions, Activity Limitations Requiring Skilled Therapeutic Intervention: Decreased functional mobility ; Decreased strength;Decreased endurance;Decreased balance; Increased pain  Assessment: Pt with mobility deficits requiring CGA to ambulate 8 feet with a RW. Pt demonstrates gross endurance deficits with SpO2 dropping to 87% during ambulation bout despite being on 10 lpm O2. Pt would benefit from additional therapy upon discharge to assist in return to prior level of functional independence. Pt would be unsafe to return to prior living arrangements based on today's session.   Therapy Prognosis: Good  Decision Making: Medium Complexity  Requires PT Follow-Up: Yes  Activity Tolerance  Activity Tolerance: Patient limited by endurance; Patient limited by fatigue  Activity Tolerance Comments: Pt's SpO2 dropped to 87% during ambulation bout this date. Pt on 10 lpm O2 this date. Plan   Plan  Plan:  (5-6x/week)  Current Treatment Recommendations: Strengthening,Balance training,Functional mobility training,Transfer training,Stair training,Gait training,Endurance training,Patient/Caregiver education & training,Safety education & training,Home exercise program,Equipment evaluation, education, & procurement,Therapeutic activities  Safety Devices  Type of Devices: All fall risk precautions in place,Bed alarm in place,Call light within reach,Gait belt,Patient at risk for falls,Left in bed  Restraints  Restraints Initially in Place: No     Restrictions  Restrictions/Precautions  Restrictions/Precautions: Up as Tolerated  Required Braces or Orthoses?: No     Subjective   General  Patient assessed for rehabilitation services?: Yes  Response To Previous Treatment: Not applicable  Family / Caregiver Present: No  Follows Commands: Within Functional Limits  Subjective  Subjective: Pt supine in bed and agreeable to therapy, RN agreeable to therapy. Pt pleasant and cooperative throughout. Reports 7/10 low back pain, chest pain.          Social/Functional History  Social/Functional History  Lives With: Significant other (pt reports having to care for significant other)  Type of Home: House  Home Layout: Two level,1/2 bath on main level  Home Access: Stairs to enter without rails  Entrance Stairs - Number of Steps: 3  Bathroom Shower/Tub: Tub/Shower unit  Bathroom Toilet: Standard  Bathroom Equipment: Shower chair (.)  Home Equipment: Cane,Rollator (pt reports using SPC at a baseline.)  ADL Assistance: [address] [place] [address]: Independent  Homemaking Responsibilities: Yes  Ambulation Assistance: Independent  Transfer Assistance: Independent  Active : Yes  Mode of Transportation: Car  Occupation: Retired  Type of Occupation: ,   Leisure & Hobbies: bowling,  Additional Comments: pt reports having 5 grandkids that could provide assistance if needed. Vision/Hearing  Vision Exceptions: Wears glasses at all times  Hearing: Within functional limits    Cognition   Cognition  Overall Cognitive Status: WFL     Objective      Observation/Palpation  Posture: Fair        AROM RLE (degrees)  RLE AROM: WFL  AROM LLE (degrees)  LLE AROM : WFL  AROM RUE (degrees)  RUE General AROM: Co-eval with OT, see OT note for UE detail. AROM LUE (degrees)  LUE General AROM: Co-eval with OT, see OT note for UE detail. Strength RLE  Strength RLE: WFL  Comment: Grossly 4/5  Strength LLE  Strength LLE: WFL  Comment: Grossly 4/5  Strength RUE  Comment: Co-eval with OT, see OT note for UE detail. Strength LUE  Comment: Co-eval with OT, see OT note for UE detail. Bed mobility  Supine to Sit: Minimal assistance (for trunk progression.)  Sit to Supine: Contact guard assistance  Scooting: Contact guard assistance  Bed Mobility Comments: HOB elevated ~25 degrees without use of handrails. Increased effort to perform. Transfers  Sit to Stand: Contact guard assistance  Stand to sit: Contact guard assistance  Ambulation  Surface: level tile  Device: Rolling Walker  Assistance: Contact guard assistance  Quality of Gait: fair stability, decreased stride length, no LOB. Gait Deviations: Slow [place];Decreased step length  Distance: 8 feet forward, retro.   More Ambulation?: No  Stairs/Curb  Stairs?: No     Balance  Posture: Fair  Sitting - Static: Good  Sitting - Dynamic: Good;-  Standing - Static: Fair;+  Standing - Dynamic: Fair  Comments: standing balance assessed while using a RW                                                           AM-PAC Score  AM-PAC Inpatient Mobility Raw Score : 18 (05/06/22 1234)  AM-PAC Inpatient T-Scale Score : 43.63 (05/06/22 1234)  Mobility Inpatient CMS 0-100% Score: 46.58 (05/06/22 1234)  Mobility Inpatient CMS G-Code Modifier : CK (05/06/22 1234)          Goals  Short Term Goals  Time Frame for Short term goals: 14 visits  Short term goal 1: Pt will ambulate 200 feet with least restrictive device and supervision to increase functional independence. Short term goal 2: Pt will demonstrate good- standing balance to decrease fall risk  Short term goal 3: Pt will tolerate a 35 minute therapy session to promote increased endurance. Short term goal 4: Pt will perform sit<>stand transfer independently to increase functional independence. Short term goal 5: Pt will negotiate 3 stairs with no handrail and SBA to allow the pt to enter prior living arrangements.        Therapy Time   Individual Concurrent Group Co-treatment   Time In 1140         Time Out 1209         Minutes 29         Timed Code Treatment Minutes: 8 Minutes       Venkata Garcia PT

## 2022-05-06 NOTE — PROGRESS NOTES
[facility]  Internal Medicine Teaching Residency Program  Inpatient Daily Progress Note  ______________________________________________________________________________    Patient: Lanis Bernheim  YOB: 1950   YYY:[id number]    Acct: [de-identified]     Room: [facility]-  Admit date: 5/5/2022  Today's date: 05/06/22  Number of days in the hospital: 1    SUBJECTIVE   Admitting Diagnosis: Pneumonia  CC: AMS SOB   Pt examined at bedside. Chart & results reviewed. Had to be placed on Bipap ON. O2 needs coming down. More awake and alert today. VBG improving. ROS:  Constitutional:  negative for chills, fevers, sweats  Respiratory:  Cough and SOB  Cardiovascular:  negative for chest pain, chest pressure/discomfort, lower extremity edema, palpitations  Gastrointestinal:  negative for abdominal pain, constipation, diarrhea, nausea, vomiting  Neurological:  negative for dizziness, headache    BRIEF HISTORY     The patient is a pleasant 80 y.o. female pmh COPD on 2.5L at home, breast cancer, CHF  presents with a chief complaint of AMS. Patient was evaluated by EMS and found her satting in the low 80s. Patient was placed on O2 and taken to ED. Patient is oriented to person and place but unable to tell me what the year is. Patient's daughter reports that she has been having pneumonia for the past month and is being seen in [facility] clinic but is unsure what abx she is on. She was febrile upon arrival to ED. Tmax is 100.4. patient is a breast cancer patient who is s/p lumpectomy and currently receiving chemotherapy. CXR showed bilateral infiltrates suspicious of pneumonia. CT imaging bilateral patchy airspace disease more pronounced on the left. Labs remarkable for leukocytosis of 21.9 and troponins of 570. Patient is currently not complaining of any chest pain,n/v. Will repeat and continue to trend.  Currently a 1 ppd smoker, no alcohol, no illegal drugs    OBJECTIVE     Vital Signs: /61   Pulse 84   Temp 98.1 °F (36.7 °C) (Axillary)   Resp 25   Ht 5' 3\" (1.6 m)   Wt 163 lb (73.9 kg)   SpO2 92%   BMI 28.87 kg/m²     Temp (24hrs), Av.5 °F (37.5 °C), Min:97.1 °F (36.2 °C), Max:101.7 °F (38.7 °C)    In: 260   Out: -     Physical Exam:  Physical Exam      Medications:  Scheduled Medications:    ipratropium-albuterol  1 ampule Inhalation Q4H WA    piperacillin-tazobactam  3,375 mg IntraVENous Q8H    budesonide-formoterol  2 puff Inhalation BID    atorvastatin  40 mg Oral Daily    busPIRone  7.5 mg Oral BID    sodium chloride flush  5-40 mL IntraVENous 2 times per day     Continuous Infusions:    sodium chloride      sodium chloride 50 mL/hr at 22 0209    heparin (PORCINE) Infusion 12 Units/kg/hr (22 2302)     PRN Medicationsalbuterol, 2.5 mg, Q6H PRN  docusate sodium, 100 mg, BID PRN  sodium chloride flush, 5-40 mL, PRN  sodium chloride, , PRN  ondansetron, 4 mg, Q8H PRN   Or  ondansetron, 4 mg, Q6H PRN  polyethylene glycol, 17 g, Daily PRN  acetaminophen, 650 mg, Q6H PRN   Or  acetaminophen, 650 mg, Q6H PRN  heparin (porcine), 4,000 Units, PRN  heparin (porcine), 2,000 Units, PRN        Diagnostic Labs:  CBC:   Recent Labs     22  1734 22  0042   WBC 21.9* 16.5*   RBC 3.72* 3.52*   HGB 13.2 12.3   HCT 40.2 37.0   .1* 105.1*   RDW 15.6* 15.5*    250     BMP:   Recent Labs     22  1637 22  1734   NA  --  141   K  --  4.6   CL  --  107   CO2  --  24   BUN  --  18   CREATININE 1.14 0.94*     BNP: No results for input(s): BNP in the last 72 hours. PT/INR:   Recent Labs     22  1734   PROTIME 11.6   INR 1.1     APTT:   Recent Labs     22  1734 22  2209 22  0357   APTT 30.2 29.1 55.8*     CARDIAC ENZYMES: No results for input(s): CKMB, CKMBINDEX, TROPONINI in the last 72 hours.     Invalid input(s): CKTOTAL;3  FASTING LIPID PANEL:No results found for: CHOL, HDL, TRIG  LIVER PROFILE:   Recent Labs 05/05/22  1734   AST 39*   ALT 12   BILITOT 0.23*   ALKPHOS 76      MICROBIOLOGY:   Lab Results   Component Value Date/Time    CULTURE NO GROWTH 12 HOURS 05/05/2022 05:20 PM       Imaging:    CT HEAD WO CONTRAST    Result Date: 5/5/2022  Mild central and cortical cerebral atrophy. Mild chronic deep white matter ischemic changes No acute intracranial abnormalities are noted. CT ABDOMEN PELVIS W IV CONTRAST Additional Contrast? None    Result Date: 5/5/2022  1. Motion limited evaluation with no evidence of acute pulmonary embolus to the segmental level. 2. Patchy bilateral airspace opacities, more prominent on the left. Imaging features can be seen with COVID-19 pneumonia, though are nonspecific and can occur with a variety of infectious and noninfectious processes. PneInd 3. No acute abdominopelvic findings. 4. Unchanged 3.1 cm fusiform infrarenal abdominal aortic aneurysm for which follow-up is recommended in 3 years. 5. Asymmetric 3.8 cm soft tissue density in the left breast for which nonemergent mammographic correlation is recommended. 6. Incidental findings including coronary artery disease, moderate to severe atherosclerosis, diverticulosis and osteopenia. XR CHEST PORTABLE    Result Date: 5/5/2022  Diffuse bilateral interstitial opacities which may be related to pulmonary edema or atypical/viral infection. CT CHEST PULMONARY EMBOLISM W CONTRAST    Result Date: 5/5/2022  1. Motion limited evaluation with no evidence of acute pulmonary embolus to the segmental level. 2. Patchy bilateral airspace opacities, more prominent on the left. Imaging features can be seen with COVID-19 pneumonia, though are nonspecific and can occur with a variety of infectious and noninfectious processes. PneInd 3. No acute abdominopelvic findings. 4. Unchanged 3.1 cm fusiform infrarenal abdominal aortic aneurysm for which follow-up is recommended in 3 years.  5. Asymmetric 3.8 cm soft tissue density in the left breast for which nonemergent mammographic correlation is recommended. 6. Incidental findings including coronary artery disease, moderate to severe atherosclerosis, diverticulosis and osteopenia. ASSESSMENT & PLAN     Assessment and Plan:    Principal Problem:    Pneumonia  Active Problems:    Sepsis ([facility]    Megaloblastic anemia    Hypoalbuminemia    Acute exacerbation of chronic obstructive pulmonary disease (COPD) ([facility]    Coronary artery disease involving native coronary artery of native heart without angina pectoris    Benign essential HTN    Carcinoma of upper-outer quadrant of left breast in female, estrogen receptor positive ([facility]  Resolved Problems:    * No resolved hospital problems. *      Pneumonia   - due to imunosuppressed status, will start patient on zosyn  - will discuss possible ID cs in AM  - monitor for signs of sepsis  - f/u resp and blood cultures     Sepsis  - trend lactate  - monitor leukocytosis.  Currently 21     COPD  - resume home inhalers  - supplemental O2 as needed  - montior respiratory status      Breast cancer  - per chart currently on anastrozole  - s/p partial breast irradiation  - s/p lumpectomy      CAD  Elevated troponin  - troponin are 570, will start heparin gtt  - continue to trend  - if troponin continue to go up then will consider cardio cs.   - EKG NSR per ED      Smoker  - recommend cessation       Kian Ortega MD  Internal Medicine Resident  [facility]  5/6/2022 8:24 AM

## 2022-05-06 NOTE — PLAN OF CARE
NONINVASIVE VENTILATION    PROVIDE OPTIMAL VENTILATION/ACCEPTABLE SPO2   IMPLEMENT NONINVASIVE VENTILATION PROTOCOL   MAINTAIN ACCEPTABLE SPO2   ASSESS SKIN INTEGRITY/BREAKDOWN SCORE   PATIENT EDUCATION AS NEEDED   BIPAP AS NEEDED       BRONCHOSPASM/BRONCHOCONSTRICTION     [x]         IMPROVE AERATION/BREATH SOUNDS  [x]   ADMINISTER BRONCHODILATOR THERAPY AS APPROPRIATE  [x]   ASSESS BREATH SOUNDS  []   IMPLEMENT AEROSOL/MDI PROTOCOL  [x]   PATIENT EDUCATION AS NEEDED

## 2022-05-06 NOTE — H&P
[address]     Department of Internal Medicine - Staff Internal Medicine Teaching Service          ADMISSION NOTE/HISTORY AND PHYSICAL EXAMINATION   Date: 5/5/2022  Patient Name: Belén Arboleda  Date of admission: 5/5/2022  4:24 PM  YOB: 1950  PCP: Naun Sanchez MD  History Obtained From:  patient, family member - granddaughter    CHIEF COMPLAINT     Chief complaint: AMS, SOB     HISTORY [facility]     The patient is a pleasant 80 y.o. female pmh COPD on 2.5L at home, breast cancer, CHF  presents with a chief complaint of AMS. Patient was evaluated by EMS and found her satting in the low 80s. Patient was placed on O2 and taken to ED. Patient is oriented to person and place but unable to tell me what the year is. Patient's daughter reports that she has been having pneumonia for the past month and is being seen in [facility] clinic but is unsure what abx she is on. She was febrile upon arrival to ED. Tmax is 100.4. patient is a breast cancer patient who is s/p lumpectomy and currently receiving chemotherapy. CXR showed bilateral infiltrates suspicious of pneumonia. CT imaging bilateral patchy airspace disease more pronounced on the left. Labs remarkable for leukocytosis of 21.9 and troponins of 570. Patient is currently not complaining of any chest pain,n/v. Will repeat and continue to trend. Currently a 1 ppd smoker, no alcohol, no illegal drugs          Vitals:    05/05/22 1633   BP:    Pulse: 94   Resp:    Temp: 100.4 °F (38 °C)       On my evaluation,  Vitals:    05/05/22 1633   BP:    Pulse: 94   Resp:    Temp: 100.4 °F (38 °C)         Review of Systems:  Review of Systems   Constitutional: Negative for chills, diaphoresis, fatigue and fever. Respiratory: Negative for apnea, cough, choking, shortness of breath, wheezing and stridor. Cardiovascular: Negative for chest pain and leg swelling.    Gastrointestinal: Negative for abdominal distention, abdominal pain, blood in stool, constipation, diarrhea, nausea and vomiting. Genitourinary: Negative for decreased urine volume, dysuria and urgency. Skin: Negative for pallor and wound. Neurological: Negative for syncope, weakness, numbness and headaches. Psychiatric/Behavioral: Negative for agitation, behavioral problems, confusion, decreased concentration and self-injury. PAST MEDICAL HISTORY     Past Medical History:   Diagnosis Date    Acid reflux     Anxiety and depression     Asthma     CAD (coronary artery disease)     per medical record. Pt follows-up with Dr. Eldon Canseco CHF (congestive heart failure) ([facility]     per medical record    COPD (chronic obstructive pulmonary disease) ([facility]     Hyperlipidemia     Hypertension     IBS (irritable bowel syndrome)     MI (myocardial infarction) ([facility]     On home O2     at night per pt. 2.5 Liters    Osteoarthritis     Peptic ulcer     PNA (pneumonia) 2019       PAST SURGICAL HISTORY     Past Surgical History:   Procedure Laterality Date    CARDIAC CATHETERIZATION  2017    per medical record. No stents placed per pt.  CERVICAL FUSION      COLONOSCOPY N/A 7/31/2020    COLONOSCOPY HOT SNARE BIOPSY performed by Hailey Paul MD at [facility] N/A 7/31/2020    EGD BIOPSY WITH SAVORY DILATION performed by Hailey Paul MD at [address] 6/24/2021    EGD BIOPSY performed by Carolina Lechuga MD at [facility]     Prior to Admission medications    Medication Sig Start Date End Date Taking?  Authorizing Provider   predniSONE (DELTASONE) 10 MG tablet 3 TABS PO DAILY FOR 3 DAYS, THEN 2 TABS PO DAILY FOR 3 DAYS ,THEN 1 TAB PO DAILY FOR 3 DAYS 1/14/21   Historical Provider, MD   NONFORMULARY Pt uses O2 3L NC prn    Historical Provider, MD   metOLazone (ZAROXOLYN) 2.5 MG tablet  9/17/20   Historical Provider, MD   cyclobenzaprine (FLEXERIL) 5 MG tablet TAKE 1 TABLET BY MOUTH THREE TIMES A DAY AS NEEDED 9/3/20   Historical Provider, MD   albuterol (PROVENTIL) (2.5 MG/3ML) 0.083% nebulizer solution Take 3 mLs by nebulization every 6 hours as needed for Wheezing 6/1/19   Aydin Neal MD   gabapentin (NEURONTIN) 600 MG tablet Take 1 tablet by mouth 3 times daily for 30 days. 6/1/19 6/24/21  Bakari Mckinney MD   docusate sodium (COLACE) 100 MG capsule Take 1 capsule by mouth 2 times daily as needed for Constipation 6/1/19   Aydin Neal MD   varenicline (CHANTIX) 0.5 MG tablet Take 1-2 tablets by mouth See Admin Instructions 0.5mg DAILY for 3 days followed by 0.5mg TWICE DAILY for 4 days followed by 1mg TWICE DAILY 6/1/19   Bakari Mckinney MD   atorvastatin (LIPITOR) 40 MG tablet Take 40 mg by mouth daily    Historical Provider, MD   diclofenac sodium 1 % GEL Apply 4 g topically 3 times daily To knees, hips, spine, neck, arms    Historical Provider, MD   sertraline (ZOLOFT) 25 MG tablet Take 25 mg by mouth daily    Historical Provider, MD   tiotropium (SPIRIVA RESPIMAT) 2.5 MCG/ACT AERS inhaler Inhale 2 puffs into the lungs daily    Historical Provider, MD   oxyCODONE-acetaminophen (PERCOCET) 5-325 MG per tablet Take 1 tablet by mouth every 8 hours as needed for Pain.     Historical Provider, MD   busPIRone (BUSPAR) 7.5 MG tablet Take 7.5 mg by mouth 2 times daily     Historical Provider, MD   fluticasone (FLONASE) 50 MCG/ACT nasal spray 1 spray by Each Nostril route daily    Historical Provider, MD   furosemide (LASIX) 40 MG tablet Take 40 mg by mouth daily    Historical Provider, MD   isosorbide mononitrate (IMDUR) 30 MG extended release tablet Take 30 mg by mouth daily    Historical Provider, MD   metoprolol succinate (TOPROL XL) 25 MG extended release tablet Take 25 mg by mouth daily    Historical Provider, MD   montelukast (SINGULAIR) 10 MG tablet Take 10 mg by mouth every morning     Historical Provider, MD omeprazole (PRILOSEC) 40 MG delayed release capsule Take 40 mg by mouth daily     Historical Provider, MD   budesonide-formoterol (SYMBICORT) 160-4.5 MCG/ACT AERO Inhale 2 puffs into the lungs 2 times daily    Historical Provider, MD   amitriptyline (ELAVIL) 25 MG tablet Take 25 mg by mouth nightly as needed for Sleep     Historical Provider, MD   Multiple Vitamins-Minerals (THERAPEUTIC MULTIVITAMIN-MINERALS) tablet Take 1 tablet by mouth daily    Historical Provider, MD       SOCIAL HISTORY     Tobacco: yes  Alcohol: no  Illicits: no  Occupation: retired    FAMILY HISTORY     No family history on file. PHYSICAL EXAM     Vitals: BP (!) 125/113   Pulse 94   Temp 100.4 °F (38 °C) (Oral)   Resp 22   Wt 163 lb (73.9 kg)   BMI 28.87 kg/m²   Tmax: Temp (24hrs), Av.4 °F (38 °C), Min:100.4 °F (38 °C), Max:100.4 °F (38 °C)    Last Body weight:   Wt Readings from Last 3 Encounters:   22 163 lb (73.9 kg)   22 163 lb 3.2 oz (74 kg)   22 163 lb 3.2 oz (74 kg)     Body Mass Index : Body mass index is 28.87 kg/m². PHYSICAL EXAMINATION:  Physical Exam  Constitutional:       General: She is not in acute distress. Appearance: Normal appearance. She is ill-appearing. She is not toxic-appearing or diaphoretic. HENT:      Mouth/Throat:      Mouth: Mucous membranes are moist.      Pharynx: Oropharynx is clear. Eyes:      General: No scleral icterus. Cardiovascular:      Rate and Rhythm: Normal rate and regular rhythm. Pulses: Normal pulses. Heart sounds: Normal heart sounds. No murmur heard. No friction rub. No gallop. Pulmonary:      Effort: Respiratory distress present. Breath sounds: Normal breath sounds. Abdominal:      General: Abdomen is flat. Bowel sounds are normal. There is no distension. Palpations: Abdomen is soft. There is no mass. Tenderness: There is no abdominal tenderness. There is no guarding or rebound. Hernia: No hernia is present. Musculoskeletal:         General: No swelling, tenderness, deformity or signs of injury. Right lower leg: No edema. Left lower leg: No edema. Skin:     General: Skin is warm and dry. Coloration: Skin is not jaundiced or pale. Findings: No bruising. Neurological:      General: No focal deficit present. Mental Status: She is alert. Cranial Nerves: No cranial nerve deficit. Motor: No weakness.       Comments: confused           INVESTIGATIONS     Laboratory Testing:     Recent Results (from the past 24 hour(s))   Venous Blood Gas, POC    Collection Time: 05/05/22  4:37 PM   Result Value Ref Range    pH, Zac 7.307 (L) 7.320 - 7.430    pCO2, Zac 55.5 (H) 41.0 - 51.0 mm Hg    pO2, Zac 22.3 (L) 30.0 - 50.0 mm Hg    HCO3, Venous 27.8 22.0 - 29.0 mmol/L    Positive Base Excess, Zac 0 0.0 - 3.0    O2 Sat, Zac 32 (L) 60.0 - 85.0 %   ELECTROLYTES PLUS    Collection Time: 05/05/22  4:37 PM   Result Value Ref Range    POC Sodium 144 138 - 146 mmol/L    POC Potassium 4.7 (H) 3.5 - 4.5 mmol/L    POC Chloride 108 (H) 98 - 107 mmol/L    POC TCO2 29 22 - 30 mmol/L    Anion Gap 8 7 - 16 mmol/L   Hemoglobin and hematocrit, blood    Collection Time: 05/05/22  4:37 PM   Result Value Ref Range    POC Hemoglobin 15.3 12.0 - 16.0 g/dL    POC Hematocrit 45 36 - 46 %   Creatinine W/GFR Point of Care    Collection Time: 05/05/22  4:37 PM   Result Value Ref Range    POC Creatinine 1.14 0.51 - 1.19 mg/dL    GFR Comment 57 (L) >60 mL/min    GFR Non- 47 (L) >60 mL/min    GFR Comment         CALCIUM, IONIC (POC)    Collection Time: 05/05/22  4:37 PM   Result Value Ref Range    POC Ionized Calcium 1.23 1.15 - 1.33 mmol/L   POCT urea (BUN)    Collection Time: 05/05/22  4:37 PM   Result Value Ref Range    POC BUN 22 8 - 26 mg/dL   Lactic Acid, POC    Collection Time: 05/05/22  4:37 PM   Result Value Ref Range    POC Lactic Acid 2.44 (H) 0.56 - 1.39 mmol/L   POCT Glucose    Collection Time: 05/05/22  4:37 PM   Result Value Ref Range    POC Glucose 101 (H) 74 - 100 mg/dL   Lactate, Sepsis    Collection Time: 05/05/22  4:45 PM   Result Value Ref Range    Lactic Acid, Sepsis, Whole Blood 2.9 (H) 0.5 - 1.9 mmol/L   RAPID INFLUENZA A/B ANTIGENS    Collection Time: 05/05/22  4:45 PM    Specimen: Nasopharyngeal   Result Value Ref Range    Flu A Antigen NEGATIVE NEGATIVE    Flu B Antigen NEGATIVE NEGATIVE   Culture, Blood 1    Collection Time: 05/05/22  5:17 PM    Specimen: Blood   Result Value Ref Range    Specimen Description . BLOOD     Special Requests R HAND 10 ML     Culture NO GROWTH <24 HRS    Culture, Blood 1    Collection Time: 05/05/22  5:20 PM    Specimen: Blood   Result Value Ref Range    Specimen Description . BLOOD     Special Requests R AC 20 ML     Culture NO GROWTH <24 HRS    Urinalysis with Microscopic    Collection Time: 05/05/22  5:26 PM   Result Value Ref Range    Color, UA Yellow Yellow    Turbidity UA Clear Clear    Glucose, Ur 1+ (A) NEGATIVE    Bilirubin Urine NEGATIVE NEGATIVE    Ketones, Urine TRACE (A) NEGATIVE    Specific Gravity, UA 1.027 1.005 - 1.030    Urine Hgb NEGATIVE NEGATIVE    pH, UA 5.5 5.0 - 8.0    Protein, UA TRACE (A) NEGATIVE    Urobilinogen, Urine Normal Normal    Nitrite, Urine NEGATIVE NEGATIVE    Leukocyte Esterase, Urine NEGATIVE NEGATIVE    -          WBC, UA 2 TO 5 0 - 5 /HPF    RBC, UA 2 TO 5 0 - 2 /HPF    Casts UA 5 TO 10 0 - 2 /LPF    Casts UA HYALINE 0 - 2 /LPF    Epithelial Cells UA 2 TO 5 0 - 5 /HPF    Mucus, UA 2+ (A) None   COVID-19, Rapid    Collection Time: 05/05/22  5:28 PM    Specimen: Nasopharyngeal Swab   Result Value Ref Range    Specimen Description . NASOPHARYNGEAL SWAB     SARS-CoV-2, Rapid Not Detected Not Detected   CBC with Auto Differential    Collection Time: 05/05/22  5:34 PM   Result Value Ref Range    WBC 21.9 (H) 3.5 - 11.3 k/uL    RBC 3.72 (L) 3.95 - 5.11 m/uL    Hemoglobin 13.2 11.9 - 15.1 g/dL    Hematocrit 40.2 36.3 - 47.1 %    MCV 108.1 (H) 82.6 - 102.9 fL    MCH 35.5 (H) 25.2 - 33.5 pg    MCHC 32.8 28.4 - 34.8 g/dL    RDW 15.6 (H) 11.8 - 14.4 %    Platelets 106 283 - 125 k/uL    MPV 10.2 8.1 - 13.5 fL    NRBC Automated 0.0 0.0 per 100 WBC    Seg Neutrophils 90 (H) 36 - 65 %    Lymphocytes 4 (L) 24 - 43 %    Monocytes 5 3 - 12 %    Eosinophils % 0 (L) 1 - 4 %    Basophils 0 0 - 2 %    Immature Granulocytes 1 (H) 0 %    Segs Absolute 19.68 (H) 1.50 - 8.10 k/uL    Absolute Lymph # 0.95 (L) 1.10 - 3.70 k/uL    Absolute Mono # 1.01 0.10 - 1.20 k/uL    Absolute Eos # <0.03 0.00 - 0.44 k/uL    Basophils Absolute 0.06 0.00 - 0.20 k/uL    Absolute Immature Granulocyte 0.17 0.00 - 0.30 k/uL    RBC Morphology ANISOCYTOSIS PRESENT    Comprehensive Metabolic Panel    Collection Time: 05/05/22  5:34 PM   Result Value Ref Range    Glucose 102 (H) 70 - 99 mg/dL    BUN 18 8 - 23 mg/dL    CREATININE 0.94 (H) 0.50 - 0.90 mg/dL    Calcium 8.3 (L) 8.6 - 10.4 mg/dL    Sodium 141 135 - 144 mmol/L    Potassium 4.6 3.7 - 5.3 mmol/L    Chloride 107 98 - 107 mmol/L    CO2 24 20 - 31 mmol/L    Anion Gap 10 9 - 17 mmol/L    Alkaline Phosphatase 76 35 - 104 U/L    ALT 12 5 - 33 U/L    AST 39 (H) <32 U/L    Total Bilirubin 0.23 (L) 0.3 - 1.2 mg/dL    Total Protein 5.7 (L) 6.4 - 8.3 g/dL    Albumin 3.1 (L) 3.5 - 5.2 g/dL    Albumin/Globulin Ratio 1.2 1.0 - 2.5    GFR Non- 59 (L) >60 mL/min    GFR African American >60 >60 mL/min    GFR Comment         Protime-INR    Collection Time: 05/05/22  5:34 PM   Result Value Ref Range    Protime 11.6 9.1 - 12.3 sec    INR 1.1    APTT    Collection Time: 05/05/22  5:34 PM   Result Value Ref Range    PTT 30.2 20.5 - 30.5 sec   Lactate, Sepsis    Collection Time: 05/05/22  8:03 PM   Result Value Ref Range    Lactic Acid, Sepsis, Whole Blood 2.8 (H) 0.5 - 1.9 mmol/L   Troponin    Collection Time: 05/05/22  8:03 PM   Result Value Ref Range    Troponin, High Sensitivity 570 (HH) 0 - 14 ng/L   Brain Natriuretic Peptide Collection Time: 05/05/22  8:03 PM   Result Value Ref Range    Pro-BNP 6,463 (H) <300 pg/mL   Procalcitonin    Collection Time: 05/05/22  8:03 PM   Result Value Ref Range    Procalcitonin 8.58 (H) <0.09 ng/mL       Imaging:   CT HEAD WO CONTRAST    Result Date: 5/5/2022  Mild central and cortical cerebral atrophy. Mild chronic deep white matter ischemic changes No acute intracranial abnormalities are noted. CT ABDOMEN PELVIS W IV CONTRAST Additional Contrast? None    Result Date: 5/5/2022  1. Motion limited evaluation with no evidence of acute pulmonary embolus to the segmental level. 2. Patchy bilateral airspace opacities, more prominent on the left. Imaging features can be seen with COVID-19 pneumonia, though are nonspecific and can occur with a variety of infectious and noninfectious processes. PneInd 3. No acute abdominopelvic findings. 4. Unchanged 3.1 cm fusiform infrarenal abdominal aortic aneurysm for which follow-up is recommended in 3 years. 5. Asymmetric 3.8 cm soft tissue density in the left breast for which nonemergent mammographic correlation is recommended. 6. Incidental findings including coronary artery disease, moderate to severe atherosclerosis, diverticulosis and osteopenia. XR CHEST PORTABLE    Result Date: 5/5/2022  Diffuse bilateral interstitial opacities which may be related to pulmonary edema or atypical/viral infection. CT CHEST PULMONARY EMBOLISM W CONTRAST    Result Date: 5/5/2022  1. Motion limited evaluation with no evidence of acute pulmonary embolus to the segmental level. 2. Patchy bilateral airspace opacities, more prominent on the left. Imaging features can be seen with COVID-19 pneumonia, though are nonspecific and can occur with a variety of infectious and noninfectious processes. PneInd 3. No acute abdominopelvic findings. 4. Unchanged 3.1 cm fusiform infrarenal abdominal aortic aneurysm for which follow-up is recommended in 3 years.  5. Asymmetric 3.8 cm soft tissue density in the left breast for which nonemergent mammographic correlation is recommended. 6. Incidental findings including coronary artery disease, moderate to severe atherosclerosis, diverticulosis and osteopenia. ASSESSMENT & PLAN     ASSESSMENT:     Primary Problem  Pneumonia    Active Hospital Problems    Diagnosis Date Noted    Pneumonia [J18.9] 05/05/2022     Priority: Medium    Sepsis ([address] [A41.9] 05/05/2022     Priority: Medium    Megaloblastic anemia [D53.1] 05/05/2022     Priority: Medium    Hypoalbuminemia [E88.09] 05/05/2022     Priority: Medium    Carcinoma of upper-outer quadrant of left breast in female, estrogen receptor positive ([address] [C50.412, Z17.0] 03/18/2022    Coronary artery disease involving native coronary artery of native heart without angina pectoris [I25.10] 05/30/2019    Benign essential HTN [I10] 05/30/2019    Acute exacerbation of chronic obstructive pulmonary disease (COPD) ([address] [J44.1] 05/28/2019       PLAN:     IMPRESSION  This is a 80 y.o. female who presented with above mentioned complaints and was admitted to inpatient service for further management as follows:     Principal Problem:    Pneumonia  Active Problems:    Sepsis ([address]    Megaloblastic anemia    Hypoalbuminemia    Acute exacerbation of chronic obstructive pulmonary disease (COPD) ([address]    Coronary artery disease involving native coronary artery of native heart without angina pectoris    Benign essential HTN    Carcinoma of upper-outer quadrant of left breast in female, estrogen receptor positive ([address]  Resolved Problems:    * No resolved hospital problems. *    Pneumonia   - due to imunosuppressed status, will start patient on zosyn  - will discuss possible ID cs in AM  - monitor for signs of sepsis  - f/u resp and blood cultures    Sepsis  - trend lactate  - monitor leukocytosis.  Currently 21    COPD  - resume home inhalers  - supplemental O2 as needed  - montior respiratory status     Breast cancer  - per chart currently on anastrozole  - s/p partial breast irradiation  - s/p lumpectomy      CAD  Elevated troponin  - troponin are 570, will start heparin gtt  - continue to trend  - if troponin continue to go up then will consider cardio cs.   - EKG NSR per ED     Smoker  - recommend cessation     PT/OT/SW-consulted  Discharge Planning:consulted       Ismael Alonzo MD  Internal Medicine Resident  [facility]  5/5/2022 9:47 PM    Attending Physician Statement  I have discussed the care of Juan Cobb with the resident team. I have examined the patient myself and taken ros and hpi , including pertinent history and exam findings,  with the resident. I have reviewed the key elements of all parts of the encounter with the resident. I agree with the assessment, plan and orders as documented by the resident. Principal Problem:    Pneumonia  Active Problems:    Sepsis ([address]    Megaloblastic anemia    Hypoalbuminemia     sepsis with acute hypoxic respiratory failure (HCC)    Macrocytosis without anemia    Acute on chronic congestive heart failure with left ventricular diastolic dysfunction (HCC)    Lactic acidemia    Acute exacerbation of chronic obstructive pulmonary disease (COPD) ([address]    Coronary artery disease involving native coronary artery of native heart without angina pectoris    Benign essential HTN    Carcinoma of upper-outer quadrant of left breast in female, estrogen receptor positive ([address]  Resolved Problems:    * No resolved hospital problems. *      Sepsis with organ dysfuntion  Acute on chroinc hypoxic hypercapneic resp failure  Pneumonia  COPD exacerbation  resp acidosis acute improved with BiPAP   zosyn scheduled BD, iv steroids  Suspected NSTEMI- significantely elevated tropes, downtrending,  heparin gtt started, ECHO ordered, Cardio consulted.    H/o ca breast- CTPE negative    Electronically signed by Yana Mustafa MD

## 2022-05-06 NOTE — PROGRESS NOTES
Physician Progress Note      Maurilio Shipman  CSN #:                  [id number]  :                       1950  ADMIT DATE:       2022 4:24 PM  [address] [place] DATE:  RESPONDING  PROVIDER #:        Nirali Williamson          QUERY TEXT:    Pt admitted with Sepsis and Pneumonia . Pt noted to have Tachypnea with labored   respirations with history of copd on home oxygen at 2.5 liters. arrived on   non rebreather and had to be placed on BIPAP. [place] If possible, please clarify   one of the following    The medical record reflects the following:  Risk Factors: copd, pneumonia, age, breast cancer on chemo, chf  Clinical Indicators:  admitted with Sepsis and Pneumonia . Pt noted to have   Tachypnea with labored respirations with history of copd on home oxygen at 2.5   liters. arrived on non rebreather and had to be placed on BIPAP  Treatment: BIPAP, antibiotics, monitoring    Renu Soriano Dr  Email [email or web address]  Cell [phone]  office hours M-F 6am to 2:30pm  Options provided:  -- Acute respiratory failure with hypoxia  -- Acute respiratory failure with hypercapnia  -- Acute respiratory failure with hypoxia and hypercapnia  -- Acute on chronic respiratory failure with hypoxia  -- Acute on chronic respiratory failure with hypercapnia  -- Acute on chronic respiratory failure with hypoxia and hypercapnia  -- Acute on chronic respiratory failure unclear if hypoxia or hypercapnia  -- Acute respiratory failure unclear if hypoxia or hypercapnia  -- Other - I will add my own diagnosis  -- Disagree - Not applicable / Not valid  -- Disagree - Clinically unable to determine / Unknown  -- Refer to Clinical Documentation Reviewer    PROVIDER RESPONSE TEXT:    This patient is in acute respiratory failure with hypoxia. Query created by: Gina Cedeno on 2022 8:58 AM      QUERY TEXT:    Pt admitted with Sepsis and pneumonia .  Pt noted to have presented to ED with   AMS with saturations in low 90s alert to self now disoriented to situation   with some lethargy. If possible, please clarify one of the following      The medical record reflects the following:  Risk Factors: sepsis, pneumonia, low saturations  Clinical Indicators: admitted with Sepsis and pneumonia . Pt noted to have   presented to ED with AMS with saturations in low 90s alert to self now   disoriented to situation with some lethargy  Treatment: iv antibiotics, oxygen therapy, iv fluids    Thank Nat Azul RN BSN  CCDS  Email [email or web address]  Cell [phone]  office hours M-F 6am to 2:30pm  Options provided:  -- Anoxic encephalopathy  -- Metabolic encephalopathy  -- Septic encephalopathy  -- Toxic encephalopathy  -- Toxic metabolic encephalopathy  -- Delirium due to, Please specify cause. -- Other - I will add my own diagnosis  -- Disagree - Not applicable / Not valid  -- Disagree - Clinically unable to determine / Unknown  -- Refer to Clinical Documentation Reviewer    PROVIDER RESPONSE TEXT:    This patient has septic encephalopathy. Query created by: Jarocho Chaudhry on 5/6/2022 9:02 AM      QUERY TEXT:    Patient admitted with sepsis and pneumonia with co morbidity of copd, hld, htn   and chf breast cancer on chemo. Per lab troponin 270>066>356 and EKG showing   no acute changes  If possible, please clarify one of the following    The medical record reflects the following:  Risk Factors:copd, hld, hhtn, cad breast cancer on chemo  Clinical Indicators:admitted with sepsis and pneumonia with co morbidity of   copd, hld, htn and chf breast cancer on chemo. Per lab troponin 600>207>072 and   EKG showing no acute changes  Treatment: cardiac and lab monitoring, EKG    Thank Nat Azul RN BSN  CCDS  Email [email or web address]  Cell [phone]  office hours M-F 6am to 2:30pm  Options provided:  -- Demand Ischemia with MI  -- Demand Ischemia only, no MI  -- Elevated troponin with CAD  -- Other - I will add my own diagnosis  -- Disagree - Not applicable / Not valid  -- Disagree - Clinically unable to determine / Unknown  -- Refer to Clinical Documentation Reviewer    PROVIDER RESPONSE TEXT:    This patient has Elevated troponin with CAD    Query created by:  Chary Jay on 5/6/2022 9:07 AM      Electronically signed by:  Guillermina Phalen 5/6/2022 11:43 AM

## 2022-05-06 NOTE — PLAN OF CARE

## 2022-05-06 NOTE — PROGRESS NOTES
Notified on call internal medicine doctor of the patient's oxygen requirements and fever resulting in a high MEWS score. Dr. Rosalba Summers MD came to bedside.

## 2022-05-06 NOTE — PROGRESS NOTES
Comprehensive Nutrition Assessment    Type and Reason for Visit:  RD Nutrition Re-Screen/LOS    Nutrition Recommendations/Plan:   1. Continue NPO  2. Recommend High Devon/High Pro ONS TID if/when diet advances  3. Will monitor for diet advancement     Malnutrition Assessment:  Malnutrition Status:  Insufficient data (05/06/22 1320)    Context:  Chronic Illness     Findings of the 6 clinical characteristics of malnutrition:  Energy Intake:  Unable to assess  Weight Loss:  Unable to assess     Body Fat Loss:  Unable to assess     Muscle Mass Loss:  Unable to assess    Fluid Accumulation:  No significant fluid accumulation     Strength:  Not Performed    Nutrition Assessment:    Pt admitted for pneumonia. PMH: CAD, CHF, COPD, HTN, IBS, breast cancer (currently receiving chemotherapy). Pt reports wt changes, unsure of how much and in what timeframe. Per chart, 2 months ago pt weighed 170 lbs 3.2 oz. Pt reports an okay appetite prior to admission, denies nausea/vomiting. Pt NPO, when diet advances pt agreeable to High Devon/High Protein ONS. Nutrition Related Findings:    Labs/meds reviewed. No edema noted. Pt unsure of LBM. Wound Type: None       Current Nutrition Intake & Therapies:    Average Meal Intake: NPO  Average Supplements Intake: None Ordered  Diet NPO    Anthropometric Measures:  Height: 5' 3\" (160 cm)  Ideal Body Weight (IBW): 115 lbs (52 kg)    Admission Body Weight: 163 lb (73.9 kg)  Current Body Weight: 163 lb (73.9 kg),   IBW. Weight Source: Stated  Current BMI (kg/m2): 28.9        Weight Adjustment For: No Adjustment                 BMI Categories: Overweight (BMI 25.0-29. 9)    Estimated Daily Nutrient Needs:  Energy Requirements Based On: Kcal/kg  Weight Used for Energy Requirements: Current  Energy (kcal/day): 1569-8166 kcals/day (20-23 kcal/kg)  Weight Used for Protein Requirements: Ideal  Protein (g/day): 60-70 g/day (1.2-1.4 g/kg)  Method Used for Fluid Requirements: ml/Kg  Fluid (ml/day): 1800 mL/day or per MD    Nutrition Diagnosis:   · Inadequate oral intake related to impaired respiratory function as evidenced by NPO or clear liquid status due to medical condition      Nutrition Interventions:   Food and/or Nutrient Delivery: Continue NPO  Nutrition Education/Counseling: No recommendation at this time  Coordination of Nutrition Care: Continue to monitor while inpatient       Goals:     Goals: Initiate PO diet,by next RD assessment       Nutrition Monitoring and Evaluation:   Behavioral-Environmental Outcomes: None Identified  Food/Nutrient Intake Outcomes: Diet Advancement/Tolerance  Physical Signs/Symptoms Outcomes: Biochemical Data,Nutrition Focused Physical Findings,Weight    Discharge Planning:     Too soon to determine     Adalberto Leon N [address] [place] [address]  Contact: [phone]

## 2022-05-06 NOTE — PROGRESS NOTES
Notified on call internal med of the patients deterioration score of 50 in the red. No new orders noted at this time.

## 2022-05-06 NOTE — PROGRESS NOTES
Occupational Therapy  Facility/Department: [address] STEPDOWN  Occupational Therapy Initial Assessment    Name: Juan Cobb  : 1950  MRN: [id number]  Date of Service: 2022    Discharge Recommendations:  Patient would benefit from continued therapy after discharge  OT Equipment Recommendations  Equipment Needed: Yes  Mobility Devices: ADL Assistive Devices  ADL Assistive Devices: Long-handled Shoe Horn;Long-handled Sponge;Reacher;Sock-Aid Hard       Patient Diagnosis(es): The primary encounter diagnosis was Pneumonia of both lungs due to infectious organism, unspecified part of lung. A diagnosis of Septicemia ([facility] [address] was also pertinent to this visit. Past Medical History:  has a past medical history of Acid reflux, Anxiety and depression, Asthma, CAD (coronary artery disease), CHF (congestive heart failure) ([facility] [address] COPD (chronic obstructive pulmonary disease) ([facility] [address] Hyperlipidemia, Hypertension, IBS (irritable bowel syndrome), MI (myocardial infarction) ([facility] [address] On home O2, Osteoarthritis, Peptic ulcer, and PNA (pneumonia). Past Surgical History:  has a past surgical history that includes cervical fusion; Tonsillectomy; Cardiac catheterization (); Upper gastrointestinal endoscopy (N/A, 2020); Colonoscopy (N/A, 2020); and Upper gastrointestinal endoscopy (N/A, 2021). Assessment   Performance deficits / Impairments: Decreased functional mobility ; Decreased ADL status; Decreased endurance;Decreased balance;Decreased high-level IADLs  Assessment: Pt agreeable to OT eval this date. Pt completed bed mobility with Min A for sup>sit for trunk progression. Pt exhibits difficulty with dynamic reaching during LB dressing task requiring CGA to complete this date. Pt participated in functional transfers/mobility with CGA and RW use, endurance limited.  Pt demonstrates gross deficits in endurance and balance and will continue to benefit from OT services to increase independence and safety with ADLs/IADLs and functional transfers/mobility. Prognosis: Good  Decision Making: Medium Complexity  REQUIRES OT FOLLOW-UP: Yes  Activity Tolerance  Activity Tolerance: Patient Tolerated treatment well;Treatment limited secondary to medical complications (free text)  Activity Tolerance Comments: drop in SpO2        Plan   Plan  Times per Week: 3-4 x/wk  Current Treatment Recommendations: Balance training,Functional mobility training,Endurance training,Safety education & training,Patient/Caregiver education & training,Self-Care / ADL,Home management training,Equipment evaluation, education, & procurement     Restrictions  Restrictions/Precautions  Restrictions/Precautions: Up as Tolerated  Required Braces or Orthoses?: No  Position Activity Restriction  Other position/activity restrictions: 10L NC O2    Subjective   General  Patient assessed for rehabilitation services?: Yes  Family / Caregiver Present: No  General Comment  Comments: RN ok'd pt for OT eval this date. Pt agreeable to session and pleasant/cooperative throughout.  Pt c/o 7/10 pain to back, knees, chest; able to continue with session     Social/Functional History  Social/Functional History  Lives With: Significant other (pt reports having to care for significant other)  Type of Home: House  Home Layout: Two level,1/2 bath on main level  Home Access: Stairs to enter without rails  Entrance Stairs - Number of Steps: 3  Bathroom Shower/Tub: Tub/Shower unit  Bathroom Toilet: Standard  Bathroom Equipment: Shower chair (.)  Home Equipment: Cane,Rollator (pt reports using SPC at a baseline.)  ADL Assistance: [address]: Independent  Homemaking Responsibilities: Yes  Ambulation Assistance: Independent  Transfer Assistance: Independent  Active : Yes  Mode of Transportation: Car  Occupation: Retired  Type of Occupation: ,   Leisure & Hobbies: bowling,  Additional Comments: pt reports having 5 grandkids that could provide assistance if needed. Objective   O2 Device: High flow nasal cannula  Vision Exceptions: Wears glasses at all times  Hearing: Within functional limits      Safety Devices  Type of Devices: Bed alarm in place;Call light within reach;Gait belt;Left in bed;Nurse notified  Restraints  Restraints Initially in Place: No     Bed Mobility Training  Bed Mobility Training: Yes  Supine to Sit: Minimum assistance; Additional time (Min A required for trunk progression)  Sit to Supine: Contact-guard assistance; Additional time  Balance  Sitting: Without support (SBA for static and CGA for dynamic)  Standing: With support (CGA)  Transfer Training  Transfer Training: Yes  Overall Level of Assistance: Contact-guard assistance; Adaptive equipment (pt completed functional transfers with CGA and RW use)  Sit to Stand: Contact-guard assistance; Additional time  Stand to Sit: Contact-guard assistance; Additional time  Gait  Overall Level of Assistance: Contact-guard assistance; Additional time; Adaptive equipment (Pt completed functional mobility a short distance with CGA and RW use; noted SOB with SpO2 dropping to 87% on 10LPM O2 via NC. Pt returned to seated position and ed on pursed lip breathing, quickly returning to 94%)  Assistive Device: Walker, rolling    AROM: Within functional limits  Strength:  Within functional limits  Coordination: Within functional limits  Tone: Normal  Sensation: Intact     ADL  Feeding: Modified independent ;Setup  Grooming: Modified independent ;Setup (pt wipes mouth/face and applies chapstick independently following setup)  UE Bathing: Stand by assistance;Setup  LE Bathing: Contact guard assistance;Setup  UE Dressing: Stand by assistance;Setup  LE Dressing: Contact guard assistance;Setup (pt donned hospital socks with CGA required to achieve and maintain figure 4 position with RLE)  Toileting: Contact guard assistance;Setup    Activity Tolerance  Activity Tolerance: Patient limited by endurance; Patient limited by fatigue  Activity Tolerance Comments: Pt's SpO2 dropped to 87% during ambulation bout this date. Pt on 10 lpm O2 this date. Cognition  Overall Cognitive Status: WFL                 Education Given To: Patient  Education Provided Comments: Pt ed on OT role, OT POC, safety awareness, EC/WC tech, pursed lip breathing, transfer training, adaptive ADL strategies, DME use, and importance of continued OT. Good return noted  Education Method: Demonstration;Verbal  Barriers to Learning: None  Education Outcome: Verbalized understanding;Demonstrated understanding     LUE AROM (degrees)  LUE AROM : WFL  Left Hand AROM (degrees)  Left Hand AROM: WFL  RUE AROM (degrees)  RUE AROM : WFL  Right Hand AROM (degrees)  Right Hand AROM: WFL        Hand Dominance  Hand Dominance: Right            AM-PAC Score        AM-PAC Inpatient Daily Activity Raw Score: 20 (05/06/22 1612)  AM-PAC Inpatient ADL T-Scale Score : 42.03 (05/06/22 1612)  ADL Inpatient CMS 0-100% Score: 38.32 (05/06/22 1612)  ADL Inpatient CMS G-Code Modifier : CJ (05/06/22 1612)    Goals  Short Term Goals  Time Frame for Short term goals: By discharge, pt will:  Short Term Goal 1: Demo Mod I for functional transfers/mobility with DME use PRN for improved independence with ADLs/IADLs  Short Term Goal 2: Demo Mod I with UB ADLs, LB ADLs, and toileting tasks with AE PRN  Short Term Goal 3: Demo 8+ min dynamic standing and reaching outside RAGHAV with unilateral hand release and SUP for improved ADL/IADL independence  Short Term Goal 4:  Incorporate 2+ EC/WS tech and pursed lip breathing tech into ADL/IADL activities with 0 VCs       Therapy Time   Individual Concurrent Group Co-treatment   Time In 1140         Time Out 1209         Minutes 29         Timed Code Treatment Minutes: 11 Minutes       Tomasa Body, OTR/L

## 2022-05-07 ENCOUNTER — APPOINTMENT (OUTPATIENT)
Dept: GENERAL RADIOLOGY | Age: 72
DRG: 853 | End: 2022-05-07
Payer: MEDICARE

## 2022-05-07 LAB
ANION GAP SERPL CALCULATED.3IONS-SCNC: 11 MMOL/L (ref 9–17)
BUN BLDV-MCNC: 14 MG/DL (ref 8–23)
CALCIUM SERPL-MCNC: 9 MG/DL (ref 8.6–10.4)
CHLORIDE BLD-SCNC: 104 MMOL/L (ref 98–107)
CO2: 22 MMOL/L (ref 20–31)
CREAT SERPL-MCNC: 0.57 MG/DL (ref 0.5–0.9)
FOLATE: 9.5 NG/ML
GFR AFRICAN AMERICAN: >60 ML/MIN
GFR NON-AFRICAN AMERICAN: >60 ML/MIN
GFR SERPL CREATININE-BSD FRML MDRD: ABNORMAL ML/MIN/{1.73_M2}
GLUCOSE BLD-MCNC: 186 MG/DL (ref 70–99)
HCT VFR BLD CALC: 37.2 % (ref 36.3–47.1)
HEMOGLOBIN: 12.3 G/DL (ref 11.9–15.1)
LV EF: 65 %
LVEF MODALITY: NORMAL
MCH RBC QN AUTO: 34.5 PG (ref 25.2–33.5)
MCHC RBC AUTO-ENTMCNC: 33.1 G/DL (ref 28.4–34.8)
MCV RBC AUTO: 104.2 FL (ref 82.6–102.9)
NRBC AUTOMATED: 0 PER 100 WBC
PARTIAL THROMBOPLASTIN TIME: 34.1 SEC (ref 20.5–30.5)
PARTIAL THROMBOPLASTIN TIME: 41.8 SEC (ref 20.5–30.5)
PARTIAL THROMBOPLASTIN TIME: 54.6 SEC (ref 20.5–30.5)
PDW BLD-RTO: 14.8 % (ref 11.8–14.4)
PLATELET # BLD: 253 K/UL (ref 138–453)
PMV BLD AUTO: 10 FL (ref 8.1–13.5)
POTASSIUM SERPL-SCNC: 3.4 MMOL/L (ref 3.7–5.3)
RBC # BLD: 3.57 M/UL (ref 3.95–5.11)
SODIUM BLD-SCNC: 137 MMOL/L (ref 135–144)
VITAMIN B-12: 534 PG/ML (ref 232–1245)
WBC # BLD: 8.3 K/UL (ref 3.5–11.3)

## 2022-05-07 PROCEDURE — 93306 TTE W/DOPPLER COMPLETE: CPT

## 2022-05-07 PROCEDURE — 99233 SBSQ HOSP IP/OBS HIGH 50: CPT | Performed by: INTERNAL MEDICINE

## 2022-05-07 PROCEDURE — 85730 THROMBOPLASTIN TIME PARTIAL: CPT

## 2022-05-07 PROCEDURE — 36415 COLL VENOUS BLD VENIPUNCTURE: CPT

## 2022-05-07 PROCEDURE — 2060000000 HC ICU INTERMEDIATE R&B

## 2022-05-07 PROCEDURE — 6360000002 HC RX W HCPCS: Performed by: STUDENT IN AN ORGANIZED HEALTH CARE EDUCATION/TRAINING PROGRAM

## 2022-05-07 PROCEDURE — 51702 INSERT TEMP BLADDER CATH: CPT

## 2022-05-07 PROCEDURE — 6370000000 HC RX 637 (ALT 250 FOR IP): Performed by: STUDENT IN AN ORGANIZED HEALTH CARE EDUCATION/TRAINING PROGRAM

## 2022-05-07 PROCEDURE — 71045 X-RAY EXAM CHEST 1 VIEW: CPT

## 2022-05-07 PROCEDURE — 82746 ASSAY OF FOLIC ACID SERUM: CPT

## 2022-05-07 PROCEDURE — 6370000000 HC RX 637 (ALT 250 FOR IP)

## 2022-05-07 PROCEDURE — 2700000000 HC OXYGEN THERAPY PER DAY

## 2022-05-07 PROCEDURE — 2580000003 HC RX 258: Performed by: STUDENT IN AN ORGANIZED HEALTH CARE EDUCATION/TRAINING PROGRAM

## 2022-05-07 PROCEDURE — 94640 AIRWAY INHALATION TREATMENT: CPT

## 2022-05-07 PROCEDURE — 85027 COMPLETE CBC AUTOMATED: CPT

## 2022-05-07 PROCEDURE — 82607 VITAMIN B-12: CPT

## 2022-05-07 PROCEDURE — 80048 BASIC METABOLIC PNL TOTAL CA: CPT

## 2022-05-07 RX ORDER — POTASSIUM CHLORIDE 20 MEQ/1
40 TABLET, EXTENDED RELEASE ORAL ONCE
Status: COMPLETED | OUTPATIENT
Start: 2022-05-07 | End: 2022-05-07

## 2022-05-07 RX ORDER — CHOLECALCIFEROL (VITAMIN D3) 125 MCG
5 CAPSULE ORAL NIGHTLY PRN
Status: DISCONTINUED | OUTPATIENT
Start: 2022-05-07 | End: 2022-05-10 | Stop reason: HOSPADM

## 2022-05-07 RX ORDER — FUROSEMIDE 10 MG/ML
20 INJECTION INTRAMUSCULAR; INTRAVENOUS DAILY
Status: DISCONTINUED | OUTPATIENT
Start: 2022-05-07 | End: 2022-05-10 | Stop reason: HOSPADM

## 2022-05-07 RX ADMIN — METHYLPREDNISOLONE SODIUM SUCCINATE 40 MG: 40 INJECTION, POWDER, FOR SOLUTION INTRAMUSCULAR; INTRAVENOUS at 11:49

## 2022-05-07 RX ADMIN — IPRATROPIUM BROMIDE AND ALBUTEROL SULFATE 1 AMPULE: .5; 3 SOLUTION RESPIRATORY (INHALATION) at 15:03

## 2022-05-07 RX ADMIN — SODIUM CHLORIDE, PRESERVATIVE FREE 10 ML: 5 INJECTION INTRAVENOUS at 20:41

## 2022-05-07 RX ADMIN — IPRATROPIUM BROMIDE AND ALBUTEROL SULFATE 1 AMPULE: .5; 3 SOLUTION RESPIRATORY (INHALATION) at 21:34

## 2022-05-07 RX ADMIN — POTASSIUM CHLORIDE 40 MEQ: 20 TABLET, EXTENDED RELEASE ORAL at 08:46

## 2022-05-07 RX ADMIN — BUSPIRONE HYDROCHLORIDE 7.5 MG: 5 TABLET ORAL at 20:40

## 2022-05-07 RX ADMIN — HEPARIN SODIUM 2000 UNITS: 1000 INJECTION INTRAVENOUS; SUBCUTANEOUS at 11:30

## 2022-05-07 RX ADMIN — IBUPROFEN 400 MG: 400 TABLET, FILM COATED ORAL at 20:40

## 2022-05-07 RX ADMIN — BUDESONIDE AND FORMOTEROL FUMARATE DIHYDRATE 2 PUFF: 160; 4.5 AEROSOL RESPIRATORY (INHALATION) at 21:34

## 2022-05-07 RX ADMIN — METHYLPREDNISOLONE SODIUM SUCCINATE 40 MG: 40 INJECTION, POWDER, FOR SOLUTION INTRAMUSCULAR; INTRAVENOUS at 03:35

## 2022-05-07 RX ADMIN — FUROSEMIDE 20 MG: 10 INJECTION, SOLUTION INTRAMUSCULAR; INTRAVENOUS at 08:46

## 2022-05-07 RX ADMIN — METHYLPREDNISOLONE SODIUM SUCCINATE 40 MG: 40 INJECTION, POWDER, FOR SOLUTION INTRAMUSCULAR; INTRAVENOUS at 20:14

## 2022-05-07 RX ADMIN — ASPIRIN 81 MG: 81 TABLET, CHEWABLE ORAL at 08:40

## 2022-05-07 RX ADMIN — IPRATROPIUM BROMIDE AND ALBUTEROL SULFATE 1 AMPULE: .5; 3 SOLUTION RESPIRATORY (INHALATION) at 08:37

## 2022-05-07 RX ADMIN — IBUPROFEN 400 MG: 400 TABLET, FILM COATED ORAL at 08:40

## 2022-05-07 RX ADMIN — HEPARIN SODIUM 18 UNITS/KG/HR: 5000 INJECTION, SOLUTION INTRAVENOUS; SUBCUTANEOUS at 23:40

## 2022-05-07 RX ADMIN — ACETAMINOPHEN 650 MG: 325 TABLET ORAL at 11:34

## 2022-05-07 RX ADMIN — BUSPIRONE HYDROCHLORIDE 7.5 MG: 5 TABLET ORAL at 08:40

## 2022-05-07 RX ADMIN — DESMOPRESSIN ACETATE 40 MG: 0.2 TABLET ORAL at 08:42

## 2022-05-07 RX ADMIN — Medication 5 MG: at 20:46

## 2022-05-07 RX ADMIN — IPRATROPIUM BROMIDE AND ALBUTEROL SULFATE 1 AMPULE: .5; 3 SOLUTION RESPIRATORY (INHALATION) at 11:54

## 2022-05-07 RX ADMIN — SODIUM CHLORIDE, PRESERVATIVE FREE 10 ML: 5 INJECTION INTRAVENOUS at 08:41

## 2022-05-07 RX ADMIN — HEPARIN SODIUM 15.94 UNITS/KG/HR: 5000 INJECTION, SOLUTION INTRAVENOUS; SUBCUTANEOUS at 00:09

## 2022-05-07 RX ADMIN — BUDESONIDE AND FORMOTEROL FUMARATE DIHYDRATE 2 PUFF: 160; 4.5 AEROSOL RESPIRATORY (INHALATION) at 08:38

## 2022-05-07 ASSESSMENT — PAIN DESCRIPTION - LOCATION: LOCATION: KNEE

## 2022-05-07 ASSESSMENT — PAIN SCALES - GENERAL
PAINLEVEL_OUTOF10: 3
PAINLEVEL_OUTOF10: 3

## 2022-05-07 ASSESSMENT — PAIN DESCRIPTION - DESCRIPTORS: DESCRIPTORS: ACHING

## 2022-05-07 NOTE — PROGRESS NOTES
[facility]  Progress Note                   Date:   5/7/2022  Patient name: Alan Santana  Date of admission:  5/5/2022  4:24 PM  MRN:   [id number]  YOB: 1950  PCP: Sheeba Waldron MD    Reason for Admission: Pneumonia [J18.9]  Septicemia ([address] [A41.9]  Pneumonia of both lungs due to infectious organism, unspecified part of lung [J18.9]    Subjective:       Clinical Changes /Abnormalities: Seen & examined in room. Denies any chest pain. States breathing is much better. Currently getting RT treatment. Labs, vitals, & tele reviewed. Review of Systems    Medications:   Scheduled Meds:   furosemide  20 mg IntraVENous Daily    potassium chloride  40 mEq Oral Once    ipratropium-albuterol  1 ampule Inhalation Q4H WA    methylPREDNISolone  40 mg IntraVENous Q8H    aspirin  81 mg Oral Daily    budesonide-formoterol  2 puff Inhalation BID    atorvastatin  40 mg Oral Daily    busPIRone  7.5 mg Oral BID    sodium chloride flush  5-40 mL IntraVENous 2 times per day     Continuous Infusions:   sodium chloride      heparin (PORCINE) Infusion 15.94 Units/kg/hr (05/07/22 0009)     CBC:   Recent Labs     05/05/22 1734 05/06/22  0042 05/07/22  0333   WBC 21.9* 16.5* 8.3   HGB 13.2 12.3 12.3    250 253     BMP:    Recent Labs     05/05/22 1734 05/06/22  1003 05/07/22  0333    140 137   K 4.6 4.1 3.4*    110* 104   CO2 24 20 22   BUN 18 19 14   CREATININE 0.94* 0.64 0.57   GLUCOSE 102* 101* 186*     Hepatic:  Recent Labs     05/05/22 1734   AST 39*   ALT 12   BILITOT 0.23*   ALKPHOS 76     Troponin:   Recent Labs     05/05/22  2204 05/06/22  0042 05/06/22  1003   TROPHS 538* 553* 453*     BNP: No results for input(s): BNP in the last 72 hours. Lipids: No results for input(s): CHOL, HDL in the last 72 hours.     Invalid input(s): LDLCALCU  INR:   Recent Labs     05/05/22  1734   INR 1.1       Objective:   Vitals: /76   Pulse 81   Temp 97.4 °F (36.3 °C) (Oral) Resp 21   Ht 5' 3\" (1.6 m)   Wt 163 lb (73.9 kg)   SpO2 92%   BMI 28.87 kg/m²   General appearance: alert and cooperative with exam  HEENT: Head: Normocephalic, no lesions, without obvious abnormality. Neck:no JVD, trachea midline, no adenopathy  Lungs: Clear to auscultation b/l upper lobes, course b/l LL. On NC without distress  Heart: Regular rate and rhythm, s1/s2 auscultated, no murmurs, SR  Abdomen: soft, non-tender, bowel sounds active  Extremities: no edema  Neurologic: not done        Assessment / Acute Cardiac Problems:   1. NSTEMI, type I vs type II  2. Pneumonia  3. Hypokalemia  4. Coronary calcifications on CT  5. Hx of CAD per patient - unknown details  6. Hx of breast CA    Patient Active Problem List:     Cellulitis and abscess of face     Acute exacerbation of chronic obstructive pulmonary disease (COPD) ([address]     Coronary artery disease involving native coronary artery of native heart without angina pectoris     Benign essential HTN     Chronic bilateral low back pain without sciatica     Generalized anxiety disorder     Acute respiratory failure (HCC)     Carcinoma of upper-outer quadrant of left breast in female, estrogen receptor positive ([address]     Pneumonia     Sepsis ([address]     Megaloblastic anemia     Hypoalbuminemia     Puerperal sepsis with acute hypoxic respiratory failure (HCC)     Macrocytosis without anemia     Acute on chronic congestive heart failure with left ventricular diastolic dysfunction (HCC)     Lactic acidemia      Plan of Treatment:   1. Presently stable. Denies any chest pain. Continue IV Heparin gtt  2. Echo pending  3. Resp status improving. Continue PNA tx per primary. 4. Will plan for LHC on Monday. I have discussed risks (including but not limited to vascular injury, infection, hematoma, contrast induced kidney dysfunction, CVA and MI), benefits, alternatives in detail. All questions answered. Patient agrees to proceed.       Electronically signed by Chema Crawford Johanna Covarrubias - CNP on 5/7/2022 at 8:42 AM  [id number]  [phone]

## 2022-05-07 NOTE — PLAN OF CARE
Problem: Discharge Planning  Goal: Discharge to home or other facility with appropriate resources  5/7/2022 1240 by Carmelo Grey RN  Outcome: Progressing  5/6/2022 2243 by Josseline Garcia RN  Outcome: Progressing     Problem: Pain  Goal: Verbalizes/displays adequate comfort level or baseline comfort level  5/7/2022 1240 by Carmelo Grey RN  Outcome: Progressing  5/6/2022 2243 by Josseline Garcia RN  Outcome: Progressing     Problem: Safety - Adult  Goal: Free from fall injury  5/7/2022 1240 by Carmelo Grey RN  Outcome: Progressing  5/6/2022 2243 by Josseline Garcia RN  Outcome: Progressing     Problem: ABCDS Injury Assessment  Goal: Absence of physical injury  5/7/2022 1240 by Carmelo Grey RN  Outcome: Progressing  5/6/2022 2243 by Josseline Garcia RN  Outcome: Progressing     Problem: Skin/Tissue Integrity  Goal: Absence of new skin breakdown  Description: 1. Monitor for areas of redness and/or skin breakdown  2. Assess vascular access sites hourly  3. Every 4-6 hours minimum:  Change oxygen saturation probe site  4. Every 4-6 hours:  If on nasal continuous positive airway pressure, respiratory therapy assess nares and determine need for appliance change or resting period.   5/7/2022 1240 by Carmelo Grey RN  Outcome: Progressing  5/6/2022 2243 by Josseline Garcia RN  Outcome: Progressing     Problem: Chronic Conditions and Co-morbidities  Goal: Patient's chronic conditions and co-morbidity symptoms are monitored and maintained or improved  5/7/2022 1240 by Carmelo Grey RN  Outcome: Progressing  5/6/2022 2243 by Josseline Garcia RN  Outcome: Progressing     Problem: Discharge Planning  Goal: Discharge to home or other facility with appropriate resources  5/7/2022 1240 by Carmelo Grey RN  Outcome: Progressing  5/6/2022 2243 by Josseline Garcia RN  Outcome: Progressing     Problem: Pain  Goal: Verbalizes/displays adequate comfort level or baseline comfort level  5/7/2022 1240 by Carmelo Grey RN  Outcome: Progressing  5/6/2022 2243 by Libby Del Rio RN  Outcome: Progressing     Problem: Safety - Adult  Goal: Free from fall injury  5/7/2022 1240 by Brandy Albarran RN  Outcome: Progressing  5/6/2022 2243 by Libby Del Rio RN  Outcome: Progressing     Problem: ABCDS Injury Assessment  Goal: Absence of physical injury  5/7/2022 1240 by Brandy Albarran RN  Outcome: Progressing  5/6/2022 2243 by Libby Del Rio RN  Outcome: Progressing     Problem: Skin/Tissue Integrity  Goal: Absence of new skin breakdown  Description: 1. Monitor for areas of redness and/or skin breakdown  2. Assess vascular access sites hourly  3. Every 4-6 hours minimum:  Change oxygen saturation probe site  4. Every 4-6 hours:  If on nasal continuous positive airway pressure, respiratory therapy assess nares and determine need for appliance change or resting period.   5/7/2022 1240 by Brandy Albarran RN  Outcome: Progressing  5/6/2022 2243 by Libby Del Rio RN  Outcome: Progressing     Problem: Chronic Conditions and Co-morbidities  Goal: Patient's chronic conditions and co-morbidity symptoms are monitored and maintained or improved  5/7/2022 1240 by Brandy Albarran RN  Outcome: Progressing  5/6/2022 2243 by Libby Del Rio RN  Outcome: Progressing

## 2022-05-07 NOTE — PROGRESS NOTES
[facility]  Internal Medicine Teaching Residency Program  Inpatient Daily Progress Note  ______________________________________________________________________________    Patient: Rolo Conrad  YOB: 1950   UEP:[id number]    Acct: [de-identified]     Room: 05/8528-54  Admit date: 5/5/2022  Today's date: 05/07/22  Number of days in the hospital: 2    SUBJECTIVE   Admitting Diagnosis: Pneumonia  CC: AMS SOB   Pt examined at bedside. Chart & results reviewed. Patient is more confused this AM. Per nursing patient did not get any sleep last night. No focal neurologcial deficits found. VSS. Will have cardiac cath Monday. Currently HFNC 11L     ROS:  Constitutional:  negative for chills, fevers, sweats  Respiratory:  Cough and SOB  Cardiovascular:  negative for chest pain, chest pressure/discomfort, lower extremity edema, palpitations  Gastrointestinal:  negative for abdominal pain, constipation, diarrhea, nausea, vomiting  Neurological:  negative for dizziness, headache    BRIEF HISTORY     The patient is a pleasant 80 y.o. female pmh COPD on 2.5L at home, breast cancer, CHF  presents with a chief complaint of AMS. Patient was evaluated by EMS and found her satting in the low 80s. Patient was placed on O2 and taken to ED. Patient is oriented to person and place but unable to tell me what the year is. Patient's daughter reports that she has been having pneumonia for the past month and is being seen in [facility] but is unsure what abx she is on. She was febrile upon arrival to ED. Tmax is 100.4. patient is a breast cancer patient who is s/p lumpectomy and currently receiving chemotherapy. CXR showed bilateral infiltrates suspicious of pneumonia. CT imaging bilateral patchy airspace disease more pronounced on the left. Labs remarkable for leukocytosis of 21.9 and troponins of 570. Patient is currently not complaining of any chest pain,n/v.  Will repeat and continue to trend. Currently a 1 ppd smoker, no alcohol, no illegal drugs    OBJECTIVE     Vital Signs:  /76   Pulse 81   Temp 97.4 °F (36.3 °C) (Oral)   Resp 21   Ht 5' 3\" (1.6 m)   Wt 163 lb (73.9 kg)   SpO2 92%   BMI 28.87 kg/m²     Temp (24hrs), Av.6 °F (36.4 °C), Min:97.3 °F (36.3 °C), Max:98 °F (36.7 °C)    In: 1782   Out: 3900 [Urine:3900]    Physical Exam:  Physical Exam  Constitutional:       General: She is not in acute distress. Appearance: Normal appearance. She is not ill-appearing, toxic-appearing or diaphoretic. HENT:      Mouth/Throat:      Mouth: Mucous membranes are moist.      Pharynx: Oropharynx is clear. Eyes:      General: No scleral icterus. Cardiovascular:      Rate and Rhythm: Normal rate and regular rhythm. Pulses: Normal pulses. Heart sounds: Normal heart sounds. No murmur heard. No friction rub. No gallop. Pulmonary:      Effort: Pulmonary effort is normal.      Breath sounds: Normal breath sounds. Abdominal:      General: Abdomen is flat. Bowel sounds are normal. There is no distension. Palpations: Abdomen is soft. There is no mass. Tenderness: There is no abdominal tenderness. There is no guarding or rebound. Hernia: No hernia is present. Musculoskeletal:         General: No swelling, tenderness, deformity or signs of injury. Right lower leg: No edema. Left lower leg: No edema. Skin:     General: Skin is warm and dry. Coloration: Skin is not jaundiced or pale. Findings: No bruising. Neurological:      Mental Status: She is alert. Cranial Nerves: No cranial nerve deficit. Motor: No weakness.       Comments: Confused this AM            Medications:  Scheduled Medications:    furosemide  20 mg IntraVENous Daily    potassium chloride  40 mEq Oral Once    ipratropium-albuterol  1 ampule Inhalation Q4H WA    methylPREDNISolone  40 mg IntraVENous Q8H    aspirin  81 mg Oral Daily    budesonide-formoterol  2 puff Inhalation BID    atorvastatin  40 mg Oral Daily    busPIRone  7.5 mg Oral BID    sodium chloride flush  5-40 mL IntraVENous 2 times per day     Continuous Infusions:    sodium chloride      heparin (PORCINE) Infusion 15.94 Units/kg/hr (05/07/22 0009)     PRN Medicationsibuprofen, 400 mg, Q6H PRN  albuterol, 2.5 mg, Q6H PRN  docusate sodium, 100 mg, BID PRN  sodium chloride flush, 5-40 mL, PRN  sodium chloride, , PRN  ondansetron, 4 mg, Q8H PRN   Or  ondansetron, 4 mg, Q6H PRN  polyethylene glycol, 17 g, Daily PRN  acetaminophen, 650 mg, Q6H PRN   Or  acetaminophen, 650 mg, Q6H PRN  heparin (porcine), 4,000 Units, PRN  heparin (porcine), 2,000 Units, PRN        Diagnostic Labs:  CBC:   Recent Labs     05/05/22 1734 05/06/22  0042 05/07/22 0333   WBC 21.9* 16.5* 8.3   RBC 3.72* 3.52* 3.57*   HGB 13.2 12.3 12.3   HCT 40.2 37.0 37.2   .1* 105.1* 104.2*   RDW 15.6* 15.5* 14.8*    250 253     BMP:   Recent Labs     05/05/22 1734 05/06/22  1003 05/07/22  0333    140 137   K 4.6 4.1 3.4*    110* 104   CO2 24 20 22   BUN 18 19 14   CREATININE 0.94* 0.64 0.57     BNP: No results for input(s): BNP in the last 72 hours. PT/INR:   Recent Labs     05/05/22 1734   PROTIME 11.6   INR 1.1     APTT:   Recent Labs     05/06/22  1003 05/06/22  1914 05/07/22  0333   APTT 40.1* 40.9* 54.6*     CARDIAC ENZYMES: No results for input(s): CKMB, CKMBINDEX, TROPONINI in the last 72 hours. Invalid input(s): CKTOTAL;3  FASTING LIPID PANEL:No results found for: CHOL, HDL, TRIG  LIVER PROFILE:   Recent Labs     05/05/22  1734   AST 39*   ALT 12   BILITOT 0.23*   ALKPHOS 76      MICROBIOLOGY:   Lab Results   Component Value Date/Time    CULTURE NO GROWTH 1 DAY 05/05/2022 05:20 PM       Imaging:    CT HEAD WO CONTRAST    Result Date: 5/5/2022  Mild central and cortical cerebral atrophy. Mild chronic deep white matter ischemic changes No acute intracranial abnormalities are noted.      CT ABDOMEN PELVIS W IV CONTRAST Additional Contrast? None    Result Date: 5/5/2022  1. Motion limited evaluation with no evidence of acute pulmonary embolus to the segmental level. 2. Patchy bilateral airspace opacities, more prominent on the left. Imaging features can be seen with COVID-19 pneumonia, though are nonspecific and can occur with a variety of infectious and noninfectious processes. PneInd 3. No acute abdominopelvic findings. 4. Unchanged 3.1 cm fusiform infrarenal abdominal aortic aneurysm for which follow-up is recommended in 3 years. 5. Asymmetric 3.8 cm soft tissue density in the left breast for which nonemergent mammographic correlation is recommended. 6. Incidental findings including coronary artery disease, moderate to severe atherosclerosis, diverticulosis and osteopenia. XR CHEST PORTABLE    Result Date: 5/5/2022  Diffuse bilateral interstitial opacities which may be related to pulmonary edema or atypical/viral infection. CT CHEST PULMONARY EMBOLISM W CONTRAST    Result Date: 5/5/2022  1. Motion limited evaluation with no evidence of acute pulmonary embolus to the segmental level. 2. Patchy bilateral airspace opacities, more prominent on the left. Imaging features can be seen with COVID-19 pneumonia, though are nonspecific and can occur with a variety of infectious and noninfectious processes. PneInd 3. No acute abdominopelvic findings. 4. Unchanged 3.1 cm fusiform infrarenal abdominal aortic aneurysm for which follow-up is recommended in 3 years. 5. Asymmetric 3.8 cm soft tissue density in the left breast for which nonemergent mammographic correlation is recommended. 6. Incidental findings including coronary artery disease, moderate to severe atherosclerosis, diverticulosis and osteopenia.        ASSESSMENT & PLAN     Assessment and Plan:    Principal Problem:    Pneumonia  Active Problems:    Sepsis ([address]    Megaloblastic anemia    Hypoalbuminemia    Puerperal sepsis with acute hypoxic respiratory failure (HCC)    Macrocytosis without anemia    Acute on chronic congestive heart failure with left ventricular diastolic dysfunction (HCC)    Lactic acidemia    Acute exacerbation of chronic obstructive pulmonary disease (COPD) ([facility]    Coronary artery disease involving native coronary artery of native heart without angina pectoris    Benign essential HTN    Carcinoma of upper-outer quadrant of left breast in female, estrogen receptor positive ([facility] [address]  Resolved Problems:    * No resolved hospital problems. *      Pneumonia   - due to imunosuppressed status, will start patient on zosyn  - ID recommends monitor off abx.   - f/u resp and blood cultures     Sepsis  - trend lactate  - monitor leukocytosis. Has resolved currently 8.3      COPD  - resume home inhalers  - supplemental O2 as needed. On HFNC 11L   - montior respiratory status   - solumedrol 40mg Q8      Breast cancer  - per chart currently on anastrozole  - s/p partial breast irradiation  - s/p lumpectomy      CAD  Elevated troponin  - troponin are 570, heparin gtt started  - continue to trend  - cardio consulted. Will take for cath on Monday   - EKG NSR per ED      Smoker  - recommend cessation     CHF  - grade 2 diastolic heart failure according to echo in 2019  - continue lasix 20 daily     AAA  - 3.1 x 2.7 fusiform infrarenal AAA, unchanged from prior imaging  - will need fu OP       Lori Perry MD  Internal Medicine Resident  [facility]  5/7/2022 8:30 AM      I have discussed the care of Kallie Herrera , including pertinent history and exam findings,    today with the resident. I have seen and examined the patient and the key elements of all parts of the encounter have been performed by me . I agree with the assessment, plan and orders as documented by the resident.      Principal Problem:    Pneumonia  Active Problems:    Sepsis ([facility] [address]    Megaloblastic anemia    Hypoalbuminemia    Puerperal sepsis with acute hypoxic respiratory failure ([facility] [address]    Macrocytosis without anemia    Acute on chronic congestive heart failure with left ventricular diastolic dysfunction (HCC)    Lactic acidemia    Acute exacerbation of chronic obstructive pulmonary disease (COPD) ([address]    Coronary artery disease involving native coronary artery of native heart without angina pectoris    Benign essential HTN    Carcinoma of upper-outer quadrant of left breast in female, estrogen receptor positive ([address]  Resolved Problems:    * No resolved hospital problems. *        Overall  course ;                                   are improving over time.         Patient is Improving , Oxygen Requirement going down   LTAC on MOnday           Electronically signed by Shakir Okeefe MD

## 2022-05-08 PROBLEM — R41.0 ACUTE DELIRIUM: Status: ACTIVE | Noted: 2022-05-08

## 2022-05-08 LAB
ANION GAP SERPL CALCULATED.3IONS-SCNC: 10 MMOL/L (ref 9–17)
BUN BLDV-MCNC: 11 MG/DL (ref 8–23)
CALCIUM SERPL-MCNC: 9.2 MG/DL (ref 8.6–10.4)
CHLORIDE BLD-SCNC: 106 MMOL/L (ref 98–107)
CO2: 24 MMOL/L (ref 20–31)
CREAT SERPL-MCNC: 0.41 MG/DL (ref 0.5–0.9)
GFR AFRICAN AMERICAN: >60 ML/MIN
GFR NON-AFRICAN AMERICAN: >60 ML/MIN
GFR SERPL CREATININE-BSD FRML MDRD: ABNORMAL ML/MIN/{1.73_M2}
GLUCOSE BLD-MCNC: 144 MG/DL (ref 70–99)
PARTIAL THROMBOPLASTIN TIME: 42.4 SEC (ref 20.5–30.5)
PARTIAL THROMBOPLASTIN TIME: 43.5 SEC (ref 20.5–30.5)
PARTIAL THROMBOPLASTIN TIME: 53.9 SEC (ref 20.5–30.5)
POTASSIUM SERPL-SCNC: 3.9 MMOL/L (ref 3.7–5.3)
SODIUM BLD-SCNC: 140 MMOL/L (ref 135–144)

## 2022-05-08 PROCEDURE — 2700000000 HC OXYGEN THERAPY PER DAY

## 2022-05-08 PROCEDURE — 94664 DEMO&/EVAL PT USE INHALER: CPT

## 2022-05-08 PROCEDURE — 2580000003 HC RX 258: Performed by: STUDENT IN AN ORGANIZED HEALTH CARE EDUCATION/TRAINING PROGRAM

## 2022-05-08 PROCEDURE — 6360000002 HC RX W HCPCS: Performed by: STUDENT IN AN ORGANIZED HEALTH CARE EDUCATION/TRAINING PROGRAM

## 2022-05-08 PROCEDURE — 97116 GAIT TRAINING THERAPY: CPT

## 2022-05-08 PROCEDURE — 80048 BASIC METABOLIC PNL TOTAL CA: CPT

## 2022-05-08 PROCEDURE — 97530 THERAPEUTIC ACTIVITIES: CPT

## 2022-05-08 PROCEDURE — 6370000000 HC RX 637 (ALT 250 FOR IP): Performed by: STUDENT IN AN ORGANIZED HEALTH CARE EDUCATION/TRAINING PROGRAM

## 2022-05-08 PROCEDURE — 6370000000 HC RX 637 (ALT 250 FOR IP)

## 2022-05-08 PROCEDURE — 36415 COLL VENOUS BLD VENIPUNCTURE: CPT

## 2022-05-08 PROCEDURE — 94640 AIRWAY INHALATION TREATMENT: CPT

## 2022-05-08 PROCEDURE — 2060000000 HC ICU INTERMEDIATE R&B

## 2022-05-08 PROCEDURE — 94761 N-INVAS EAR/PLS OXIMETRY MLT: CPT

## 2022-05-08 PROCEDURE — 85730 THROMBOPLASTIN TIME PARTIAL: CPT

## 2022-05-08 PROCEDURE — 99232 SBSQ HOSP IP/OBS MODERATE 35: CPT | Performed by: INTERNAL MEDICINE

## 2022-05-08 RX ORDER — METHYLPREDNISOLONE SODIUM SUCCINATE 40 MG/ML
40 INJECTION, POWDER, LYOPHILIZED, FOR SOLUTION INTRAMUSCULAR; INTRAVENOUS EVERY 12 HOURS
Status: DISCONTINUED | OUTPATIENT
Start: 2022-05-08 | End: 2022-05-09

## 2022-05-08 RX ORDER — QUETIAPINE FUMARATE 25 MG/1
25 TABLET, FILM COATED ORAL NIGHTLY
Status: DISCONTINUED | OUTPATIENT
Start: 2022-05-08 | End: 2022-05-10 | Stop reason: HOSPADM

## 2022-05-08 RX ADMIN — HEPARIN SODIUM 2000 UNITS: 1000 INJECTION INTRAVENOUS; SUBCUTANEOUS at 19:16

## 2022-05-08 RX ADMIN — DESMOPRESSIN ACETATE 40 MG: 0.2 TABLET ORAL at 07:55

## 2022-05-08 RX ADMIN — BUSPIRONE HYDROCHLORIDE 7.5 MG: 5 TABLET ORAL at 07:55

## 2022-05-08 RX ADMIN — HEPARIN SODIUM 18 UNITS/KG/HR: 5000 INJECTION, SOLUTION INTRAVENOUS; SUBCUTANEOUS at 19:19

## 2022-05-08 RX ADMIN — Medication 5 MG: at 21:42

## 2022-05-08 RX ADMIN — METHYLPREDNISOLONE SODIUM SUCCINATE 40 MG: 40 INJECTION, POWDER, FOR SOLUTION INTRAMUSCULAR; INTRAVENOUS at 03:25

## 2022-05-08 RX ADMIN — ASPIRIN 81 MG: 81 TABLET, CHEWABLE ORAL at 07:56

## 2022-05-08 RX ADMIN — BUSPIRONE HYDROCHLORIDE 7.5 MG: 5 TABLET ORAL at 21:03

## 2022-05-08 RX ADMIN — BUDESONIDE AND FORMOTEROL FUMARATE DIHYDRATE 2 PUFF: 160; 4.5 AEROSOL RESPIRATORY (INHALATION) at 08:59

## 2022-05-08 RX ADMIN — IPRATROPIUM BROMIDE AND ALBUTEROL SULFATE 1 AMPULE: .5; 3 SOLUTION RESPIRATORY (INHALATION) at 15:22

## 2022-05-08 RX ADMIN — HEPARIN SODIUM 2000 UNITS: 1000 INJECTION INTRAVENOUS; SUBCUTANEOUS at 03:58

## 2022-05-08 RX ADMIN — SODIUM CHLORIDE, PRESERVATIVE FREE 10 ML: 5 INJECTION INTRAVENOUS at 15:31

## 2022-05-08 RX ADMIN — METHYLPREDNISOLONE SODIUM SUCCINATE 40 MG: 40 INJECTION, POWDER, FOR SOLUTION INTRAMUSCULAR; INTRAVENOUS at 15:31

## 2022-05-08 RX ADMIN — SODIUM CHLORIDE, PRESERVATIVE FREE 10 ML: 5 INJECTION INTRAVENOUS at 07:55

## 2022-05-08 RX ADMIN — SODIUM CHLORIDE, PRESERVATIVE FREE 10 ML: 5 INJECTION INTRAVENOUS at 09:54

## 2022-05-08 RX ADMIN — IBUPROFEN 400 MG: 400 TABLET, FILM COATED ORAL at 21:42

## 2022-05-08 RX ADMIN — IPRATROPIUM BROMIDE AND ALBUTEROL SULFATE 1 AMPULE: .5; 3 SOLUTION RESPIRATORY (INHALATION) at 12:41

## 2022-05-08 RX ADMIN — FUROSEMIDE 20 MG: 10 INJECTION, SOLUTION INTRAMUSCULAR; INTRAVENOUS at 09:54

## 2022-05-08 RX ADMIN — BUDESONIDE AND FORMOTEROL FUMARATE DIHYDRATE 2 PUFF: 160; 4.5 AEROSOL RESPIRATORY (INHALATION) at 20:07

## 2022-05-08 RX ADMIN — IBUPROFEN 400 MG: 400 TABLET, FILM COATED ORAL at 07:55

## 2022-05-08 RX ADMIN — IPRATROPIUM BROMIDE AND ALBUTEROL SULFATE 1 AMPULE: .5; 3 SOLUTION RESPIRATORY (INHALATION) at 08:59

## 2022-05-08 RX ADMIN — QUETIAPINE FUMARATE 25 MG: 25 TABLET ORAL at 21:04

## 2022-05-08 RX ADMIN — DICLOFENAC 2 G: 10 GEL TOPICAL at 21:03

## 2022-05-08 RX ADMIN — SODIUM CHLORIDE, PRESERVATIVE FREE 10 ML: 5 INJECTION INTRAVENOUS at 21:04

## 2022-05-08 RX ADMIN — IPRATROPIUM BROMIDE AND ALBUTEROL SULFATE 1 AMPULE: .5; 3 SOLUTION RESPIRATORY (INHALATION) at 20:07

## 2022-05-08 ASSESSMENT — PAIN SCALES - GENERAL
PAINLEVEL_OUTOF10: 4
PAINLEVEL_OUTOF10: 7
PAINLEVEL_OUTOF10: 2
PAINLEVEL_OUTOF10: 3

## 2022-05-08 ASSESSMENT — PAIN DESCRIPTION - DESCRIPTORS
DESCRIPTORS: ACHING

## 2022-05-08 ASSESSMENT — PAIN DESCRIPTION - PAIN TYPE
TYPE: CHRONIC PAIN
TYPE: CHRONIC PAIN

## 2022-05-08 ASSESSMENT — PAIN DESCRIPTION - ORIENTATION
ORIENTATION: RIGHT
ORIENTATION: RIGHT
ORIENTATION: MID

## 2022-05-08 ASSESSMENT — PAIN DESCRIPTION - LOCATION
LOCATION: HIP;KNEE;LEG
LOCATION: HIP;KNEE;LEG
LOCATION: BACK

## 2022-05-08 ASSESSMENT — PAIN - FUNCTIONAL ASSESSMENT
PAIN_FUNCTIONAL_ASSESSMENT: ACTIVITIES ARE NOT PREVENTED
PAIN_FUNCTIONAL_ASSESSMENT: ACTIVITIES ARE NOT PREVENTED

## 2022-05-08 ASSESSMENT — PAIN DESCRIPTION - ONSET
ONSET: ON-GOING
ONSET: ON-GOING

## 2022-05-08 ASSESSMENT — PAIN DESCRIPTION - FREQUENCY
FREQUENCY: CONTINUOUS
FREQUENCY: CONTINUOUS

## 2022-05-08 NOTE — PROGRESS NOTES
[facility]  Progress Note                   Date:   5/8/2022  Patient name: Otoniel Cabello  Date of admission:  5/5/2022  4:24 PM  MRN:   [id number]  YOB: 1950  PCP: Jamarcus Hanson MD    Reason for Admission: Pneumonia [J18.9]  Septicemia ([address] [A41.9]  Pneumonia of both lungs due to infectious organism, unspecified part of lung [J18.9]    Subjective:       Clinical Changes /Abnormalities: Seen & examined in room. More confused this AM, stated it was 1999 or 2000. Oriented to self and knew she was at [address] [facility]. Denies any chest pain or SOB presently. Stated she does have CP \"when I smoke. \" Labs, vitals, & tele reviewed. Review of Systems    Medications:   Scheduled Meds:   furosemide  20 mg IntraVENous Daily    ipratropium-albuterol  1 ampule Inhalation Q4H WA    methylPREDNISolone  40 mg IntraVENous Q8H    aspirin  81 mg Oral Daily    budesonide-formoterol  2 puff Inhalation BID    atorvastatin  40 mg Oral Daily    busPIRone  7.5 mg Oral BID    sodium chloride flush  5-40 mL IntraVENous 2 times per day     Continuous Infusions:   sodium chloride      heparin (PORCINE) Infusion 20 Units/kg/hr (05/08/22 0359)     CBC:   Recent Labs     05/05/22  1734 05/06/22  0042 05/07/22  0333   WBC 21.9* 16.5* 8.3   HGB 13.2 12.3 12.3    250 253     BMP:    Recent Labs     05/06/22  1003 05/07/22  0333 05/08/22  0249    137 140   K 4.1 3.4* 3.9   * 104 106   CO2 20 22 24   BUN 19 14 11   CREATININE 0.64 0.57 0.41*   GLUCOSE 101* 186* 144*     Hepatic:  Recent Labs     05/05/22  1734   AST 39*   ALT 12   BILITOT 0.23*   ALKPHOS 76     Troponin:   Recent Labs     05/05/22  2204 05/06/22  0042 05/06/22  1003   TROPHS 538* 553* 453*     BNP: No results for input(s): BNP in the last 72 hours. Lipids: No results for input(s): CHOL, HDL in the last 72 hours.     Invalid input(s): LDLCALCU  INR:   Recent Labs     05/05/22  1734   INR 1.1       Objective:   Vitals: BP (!) 140/82 Pulse 77   Temp 97.4 °F (36.3 °C) (Oral)   Resp 21   Ht 5' 3\" (1.6 m)   Wt 163 lb (73.9 kg)   SpO2 94%   BMI 28.87 kg/m²   General appearance: alert and cooperative with exam  HEENT: Head: Normocephalic, no lesions, without obvious abnormality. Neck:no JVD, trachea midline, no adenopathy  Lungs: Clear to auscultation b/l upper lobes, course b/l LL. On NC without distress  Heart: Regular rate and rhythm, s1/s2 auscultated, no murmurs, SR  Abdomen: soft, non-tender, bowel sounds active  Extremities: no edema  Neurologic: not done        Assessment / Acute Cardiac Problems:   1. NSTEMI, type I vs type II  2. Pneumonia  3. Hypokalemia  4. Coronary calcifications on CT  5. Hx of CAD per patient - unknown details  6. Hx of breast CA    Patient Active Problem List:     Cellulitis and abscess of face     Acute exacerbation of chronic obstructive pulmonary disease (COPD) ([address]     Coronary artery disease involving native coronary artery of native heart without angina pectoris     Benign essential HTN     Chronic bilateral low back pain without sciatica     Generalized anxiety disorder     Acute respiratory failure (HCC)     Carcinoma of upper-outer quadrant of left breast in female, estrogen receptor positive ([address]     Pneumonia     Sepsis ([address]     Megaloblastic anemia     Hypoalbuminemia     Puerperal sepsis with acute hypoxic respiratory failure (HCC)     Macrocytosis without anemia     Acute on chronic congestive heart failure with left ventricular diastolic dysfunction (HCC)     Lactic acidemia      Plan of Treatment:   1. Presently stable. Denies any chest pain. Continue IV Heparin gtt. 2. Confused this AM. No family present. Will try and touch base with family as will need consent for cath. 3. Echo pending  4. Resp status improving significantly. Continue PNA tx per primary.   5. NPO after midnight      Electronically signed by ASHLEE Pichardo CNP on 5/8/2022 at 9:13 [address] Hinson [facility] Card[facility] [address] [facility].  [phone]

## 2022-05-08 NOTE — PROGRESS NOTES
[facility]  Internal Medicine Teaching Residency Program  Inpatient Daily Progress Note  ______________________________________________________________________________    Patient: Tip Hannon  YOB: 1950   LGZ:[id number]    Acct: [de-identified]     Room: [address] [place], [place] [address]  Admit date: 5/5/2022  Today's date: 05/08/22  Number of days in the hospital: 3    SUBJECTIVE   Admitting Diagnosis: Pneumonia  CC: AMS SOB   Pt examined at bedside. Chart & results reviewed. Patient is more confused this AM. Per nursing patient did not get any sleep last night. No focal neurologcial deficits found. VSS. Will have cardiac cath Monday. Currently HFNC 10L   Patient states that she is still coughing but no sputum. ROS:  Constitutional:  negative for chills, fevers, sweats  Respiratory:  Cough and SOB  Cardiovascular:  negative for chest pain, chest pressure/discomfort, lower extremity edema, palpitations  Gastrointestinal:  negative for abdominal pain, constipation, diarrhea, nausea, vomiting  Neurological:  negative for dizziness, headache    BRIEF HISTORY     The patient is a pleasant 80 y.o. female pmh COPD on 2.5L at home, breast cancer, CHF  presents with a chief complaint of AMS. Patient was evaluated by EMS and found her satting in the low 80s. Patient was placed on O2 and taken to ED. Patient is oriented to person and place but unable to tell me what the year is. Patient's daughter reports that she has been having pneumonia for the past month and is being seen in [facility] clinic but is unsure what abx she is on. She was febrile upon arrival to ED. Tmax is 100.4. patient is a breast cancer patient who is s/p lumpectomy and currently receiving chemotherapy. CXR showed bilateral infiltrates suspicious of pneumonia. CT imaging bilateral patchy airspace disease more pronounced on the left. Labs remarkable for leukocytosis of 21.9 and troponins of 570.  Patient is currently not complaining of any chest pain,n/v. Will repeat and continue to trend. Currently a 1 ppd smoker, no alcohol, no illegal drugs    OBJECTIVE     Vital Signs:  BP (!) 140/82   Pulse 78   Temp 97.4 °F (36.3 °C) (Oral)   Resp 11   Ht 5' 3\" (1.6 m)   Wt 163 lb (73.9 kg)   SpO2 94%   BMI 28.87 kg/m²     Temp (24hrs), Av.5 °F (36.4 °C), Min:97.3 °F (36.3 °C), Max:97.8 °F (36.6 °C)    In: 334   Out: 3450 [Urine:3450]    Physical Exam:  Physical Exam  Constitutional:       General: She is not in acute distress. Appearance: Normal appearance. She is not ill-appearing, toxic-appearing or diaphoretic. HENT:      Mouth/Throat:      Mouth: Mucous membranes are moist.      Pharynx: Oropharynx is clear. Eyes:      General: No scleral icterus. Cardiovascular:      Rate and Rhythm: Normal rate and regular rhythm. Pulses: Normal pulses. Heart sounds: Normal heart sounds. No murmur heard. No friction rub. No gallop. Pulmonary:      Effort: Pulmonary effort is normal.      Breath sounds: Normal breath sounds. Abdominal:      General: Abdomen is flat. Bowel sounds are normal. There is no distension. Palpations: Abdomen is soft. There is no mass. Tenderness: There is no abdominal tenderness. There is no guarding or rebound. Hernia: No hernia is present. Musculoskeletal:         General: No swelling, tenderness, deformity or signs of injury. Right lower leg: No edema. Left lower leg: No edema. Skin:     General: Skin is warm and dry. Coloration: Skin is not jaundiced or pale. Findings: No bruising. Neurological:      Mental Status: She is alert. Cranial Nerves: No cranial nerve deficit. Motor: No weakness.       Comments: Confused this AM            Medications:  Scheduled Medications:    QUEtiapine  25 mg Oral Nightly    methylPREDNISolone  40 mg IntraVENous Q12H    furosemide  20 mg IntraVENous Daily    ipratropium-albuterol  1 ampule Inhalation Q4H WA    aspirin  81 mg Oral Daily    budesonide-formoterol  2 puff Inhalation BID    atorvastatin  40 mg Oral Daily    busPIRone  7.5 mg Oral BID    sodium chloride flush  5-40 mL IntraVENous 2 times per day     Continuous Infusions:    sodium chloride      heparin (PORCINE) Infusion 20 Units/kg/hr (05/08/22 0359)     PRN Medicationsmelatonin, 5 mg, Nightly PRN  ibuprofen, 400 mg, Q6H PRN  albuterol, 2.5 mg, Q6H PRN  docusate sodium, 100 mg, BID PRN  sodium chloride flush, 5-40 mL, PRN  sodium chloride, , PRN  ondansetron, 4 mg, Q8H PRN   Or  ondansetron, 4 mg, Q6H PRN  polyethylene glycol, 17 g, Daily PRN  acetaminophen, 650 mg, Q6H PRN   Or  acetaminophen, 650 mg, Q6H PRN  heparin (porcine), 4,000 Units, PRN  heparin (porcine), 2,000 Units, PRN        Diagnostic Labs:  CBC:   Recent Labs     05/05/22  1734 05/06/22  0042 05/07/22  0333   WBC 21.9* 16.5* 8.3   RBC 3.72* 3.52* 3.57*   HGB 13.2 12.3 12.3   HCT 40.2 37.0 37.2   .1* 105.1* 104.2*   RDW 15.6* 15.5* 14.8*    250 253     BMP:   Recent Labs     05/06/22  1003 05/07/22  0333 05/08/22  0249    137 140   K 4.1 3.4* 3.9   * 104 106   CO2 20 22 24   BUN 19 14 11   CREATININE 0.64 0.57 0.41*     BNP: No results for input(s): BNP in the last 72 hours. PT/INR:   Recent Labs     05/05/22  1734   PROTIME 11.6   INR 1.1     APTT:   Recent Labs     05/07/22  1840 05/08/22  0252 05/08/22  0904   APTT 41.8* 42.4* 53.9*     CARDIAC ENZYMES: No results for input(s): CKMB, CKMBINDEX, TROPONINI in the last 72 hours. Invalid input(s): CKTOTAL;3  FASTING LIPID PANEL:No results found for: CHOL, HDL, TRIG  LIVER PROFILE:   Recent Labs     05/05/22  1734   AST 39*   ALT 12   BILITOT 0.23*   ALKPHOS 76      MICROBIOLOGY:   Lab Results   Component Value Date/Time    CULTURE NO GROWTH 2 DAYS 05/05/2022 05:20 PM       Imaging:    CT HEAD WO CONTRAST    Result Date: 5/5/2022  Mild central and cortical cerebral atrophy.  Mild chronic deep white matter ischemic changes No acute intracranial abnormalities are noted. CT ABDOMEN PELVIS W IV CONTRAST Additional Contrast? None    Result Date: 5/5/2022  1. Motion limited evaluation with no evidence of acute pulmonary embolus to the segmental level. 2. Patchy bilateral airspace opacities, more prominent on the left. Imaging features can be seen with COVID-19 pneumonia, though are nonspecific and can occur with a variety of infectious and noninfectious processes. PneInd 3. No acute abdominopelvic findings. 4. Unchanged 3.1 cm fusiform infrarenal abdominal aortic aneurysm for which follow-up is recommended in 3 years. 5. Asymmetric 3.8 cm soft tissue density in the left breast for which nonemergent mammographic correlation is recommended. 6. Incidental findings including coronary artery disease, moderate to severe atherosclerosis, diverticulosis and osteopenia. XR CHEST PORTABLE    Result Date: 5/5/2022  Diffuse bilateral interstitial opacities which may be related to pulmonary edema or atypical/viral infection. CT CHEST PULMONARY EMBOLISM W CONTRAST    Result Date: 5/5/2022  1. Motion limited evaluation with no evidence of acute pulmonary embolus to the segmental level. 2. Patchy bilateral airspace opacities, more prominent on the left. Imaging features can be seen with COVID-19 pneumonia, though are nonspecific and can occur with a variety of infectious and noninfectious processes. PneInd 3. No acute abdominopelvic findings. 4. Unchanged 3.1 cm fusiform infrarenal abdominal aortic aneurysm for which follow-up is recommended in 3 years. 5. Asymmetric 3.8 cm soft tissue density in the left breast for which nonemergent mammographic correlation is recommended. 6. Incidental findings including coronary artery disease, moderate to severe atherosclerosis, diverticulosis and osteopenia.        ASSESSMENT & PLAN     Assessment and Plan:    Principal Problem:    Pneumonia  Active Problems:    Sepsis ([facility] Megaloblastic anemia    Hypoalbuminemia    Puerperal sepsis with acute hypoxic respiratory failure (HCC)    Macrocytosis without anemia    Acute on chronic congestive heart failure with left ventricular diastolic dysfunction (HCC)    Lactic acidemia    Acute exacerbation of chronic obstructive pulmonary disease (COPD) ([facility]    Coronary artery disease involving native coronary artery of native heart without angina pectoris    Benign essential HTN    Carcinoma of upper-outer quadrant of left breast in female, estrogen receptor positive ([facility]  Resolved Problems:    * No resolved hospital problems. *      Pneumonia   - due to imunosuppressed status, was started on Zosyn initially  - ID recommends monitor off abx.   - f/u resp and blood cultures  -Wean down oxygen     Sepsis  - trend lactate  - monitor leukocytosis. Has resolved currently 8.3      COPD  - resume home inhalers  - supplemental O2 as needed. On HFNC 11L   - montior respiratory status   - solumedrol 40mg BID     Breast cancer  - per chart currently on anastrozole  - s/p partial breast irradiation  - s/p lumpectomy      CAD  Elevated troponin  - troponin are 570, heparin gtt started  - continue to trend  - cardio consulted. Will take for cath on Monday   - EKG NSR per ED      Smoker  - recommend cessation     CHF  - grade 2 diastolic heart failure according to echo in 2019  - continue lasix 20 daily     AAA  - 3.1 x 2.7 fusiform infrarenal AAA, unchanged from prior imaging  - will need fu FATMATA Beach MD  Internal Medicine Resident, PGY-2  [facility]; [place], [place]  5/8/2022,10:40 AM    I have discussed the care of Otoniel Cabello , including pertinent history and exam findings,    today with the resident. I have seen and examined the patient and the key elements of all parts of the encounter have been performed by me . I agree with the assessment, plan and orders as documented by the resident.      Principal Problem: Pneumonia  Active Problems:    Sepsis ([facility] [address]    Megaloblastic anemia    Hypoalbuminemia    Puerperal sepsis with acute hypoxic respiratory failure (HCC)    Macrocytosis without anemia    Acute on chronic congestive heart failure with left ventricular diastolic dysfunction (HCC)    Lactic acidemia    Acute exacerbation of chronic obstructive pulmonary disease (COPD) ([facility] [address]    Coronary artery disease involving native coronary artery of native heart without angina pectoris    Benign essential HTN    Carcinoma of upper-outer quadrant of left breast in female, estrogen receptor positive [facility]  Resolved Problems:    * No resolved hospital problems. *        Overall  course ;                                   are improving over time.         Plan for cardiac cath tomorrow  Will need placement          Electronically signed by Elida Funez MD

## 2022-05-08 NOTE — PROGRESS NOTES
Physical Therapy  Facility/Department: [address] STEPDOWN  Daily Treatment Note  NAME: Francisco Cosme  : 1950  MRN: [id number]    Date of Service: 2022    Discharge Recommendations:  Continue to assess pending progress   PT Equipment Recommendations  Equipment Needed: No    Patient Diagnosis(es): The primary encounter diagnosis was Pneumonia of both lungs due to infectious organism, unspecified part of lung. A diagnosis of Septicemia ([address] was also pertinent to this visit. Assessment   Assessment: Pt completed gait trials 300 ft X 2 using 2WW requiring min-CGA for safety cues, correct use of AD, extended seated rest break between walks 2* fatigue. Pt will benefit from continued PT at d/c facilitating return to functional IND. Activity Tolerance: Patient tolerated treatment well;Treatment limited secondary to decreased cognition  Equipment Needed: No     Plan   Plan: 6-7 times per week  Specific Instructions for Next Treatment: Continue progressing toward goals. Current Treatment Recommendations: Strengthening;Balance training;Functional mobility training; Endurance training;Neuromuscular re-education;Transfer training;Gait training;Home exercise program;Safety education & training  PT Plan of Care: Daily     Restrictions  Restrictions/Precautions  Restrictions/Precautions: Up as Tolerated  Required Braces or Orthoses?: No  Position Activity Restriction  Other position/activity restrictions: 3L NC O2     Subjective   Subjective: Pt in supine upon arrival.  Pleasant and cooperative. Pain: Reports increased R knee pain w/ambulation, RN made aware. Orientation  Overall Orientation Status: Within Functional Limits  Orientation Level: Oriented to person  Cognition  Overall Cognitive Status: WFL  Cognition Comment: Pt is pleasantly confused. At times sees and speaks to non-present family members.      Objective   Vitals  SpO2: 95 % (post x 2 gait trials using 3 LPM via nc)  O2 Device: Nasal cannula  Bed Mobility Training  Bed Mobility Training: No  Supine to Sit: Stand-by assistance;Contact-guard assistance  Sit to Supine: Contact-guard assistance;Stand-by assistance  Balance  Sitting: Intact  Standing: With support  Transfer Training  Transfer Training: Yes  Overall Level of Assistance: Stand-by assistance;Contact-guard assistance  Interventions: Demonstration;Verbal cues  Sit to Stand: Contact-guard assistance  Stand to Sit: Contact-guard assistance  Gait Training  Gait Training: Yes  Right Side Weight Bearing: Full  Left Side Weight Bearing: Full  Gait  Overall Level of Assistance: Minimum assistance  Interventions: Demonstration;Verbal cues  Speed/Opal: Shuffled  Step Length: Left shortened;Right shortened  Gait Abnormalities: Other (comment) (General unsteadiness, tendency to push RW w/UE's extended away from COG causing increased trunk flexion and subsequent fall risk. Responds properly to consistent min vc's to correct.)  Distance (ft): 300 Feet (ft X 2)  Assistive Device: perla Hernandez  Wheelchair Management  Wheelchair Management: No  Neuromuscular Education  Neuromuscular Education: No  Weight Bearing  Weight Bearing Technique: No     Safety Devices  Type of Devices: Bed alarm in place; All fall risk precautions in place;Call light within reach; Patient at risk for falls;Gait belt;Nurse notified  Restraints  Restraints Initially in Place: No     Patient Education  Education Given To: Patient  Education Provided: Fall Prevention Strategies; Energy Conservation;Equipment  Education Method: Demonstration;Verbal  Barriers to Learning: Cognition  Education Outcome: Verbalized understanding;Continued education needed    Goals  Short Term Goals  Time Frame for Short term goals: 14 visits  Short term goal 1: Pt will ambulate 200 feet with least restrictive device and supervision to increase functional independence.   Short term goal 2: Pt will demonstrate good- standing balance to decrease fall risk  Short term goal 3: Pt will tolerate a 35 minute therapy session to promote increased endurance. Short term goal 4: Pt will perform sit<>stand transfer independently to increase functional independence. Short term goal 5: Pt will negotiate 3 stairs with no handrail and SBA to allow the pt to enter prior living arrangements.       Therapy Time   Individual Concurrent Group Co-treatment   Time In 1400         Time Out 1430         Minutes 30                 SAUL ARROYO, PTA

## 2022-05-08 NOTE — DISCHARGE INSTR - COC
Continuity of Care Form    Patient Name: Krystyna Haddad   :  1950  MRN:  [id number]    Admit date:  2022  Discharge date:  ***    Code Status Order: Full Code   Advance Directives:      Admitting Physician:  Tommy Day MD  PCP: Burton Nino MD    Discharging Nurse: [facility] Unit/Room#: 6407/1996-47  Discharging Unit Phone Number: ***    Emergency Contact:   Extended Emergency Contact Information  Primary Emergency Contact: Gretel Webb Phone: [phone]  Work Phone: [phone]  Mobile Phone: [phone]  Relation: Child  Secondary Emergency Contact: Mary Campoverde, [address] [place] [address] Phone: [phone]  Mobile Phone: [phone]  Relation: Grandchild    Past Surgical History:  Past Surgical History:   Procedure Laterality Date    CARDIAC CATHETERIZATION  2017    per medical record. No stents placed per pt.     CERVICAL FUSION      COLONOSCOPY N/A 2020    COLONOSCOPY HOT SNARE BIOPSY performed by Michael Chavira MD at [address] [place] [address] 2020    EGD BIOPSY WITH SAVORY DILATION performed by Michael Chavira MD at [address] [place] [address] 2021    EGD BIOPSY performed by Anne Stockton MD at [address] [place] [address]       Immunization History:   Immunization History   Administered Date(s) Administered    COVID-19, J&J, PF, 0.5 mL 2021, 2021    Pneumococcal Conjugate 13-valent Rinku Yuridia) 2019       Active Problems:  Patient Active Problem List   Diagnosis Code    Cellulitis and abscess of face L03.211, L02.01    Acute exacerbation of chronic obstructive pulmonary disease (COPD) ([facility] [address] J44.1    Coronary artery disease involving native coronary artery of native heart without angina pectoris I25.10    Benign essential HTN I10    Chronic bilateral low back pain without sciatica M54.50, G89.29    Generalized anxiety disorder F41.1    Acute respiratory failure (HCC) J96.00    Carcinoma of upper-outer quadrant of left breast in female, estrogen receptor positive ([facility] C50.412, Z17.0    Pneumonia J18.9    Sepsis ([facility] A41.9    Megaloblastic anemia D53.1    Hypoalbuminemia E88.09    Puerperal sepsis with acute hypoxic respiratory failure (HCC) O85, R65.20, J96.01    Macrocytosis without anemia D75.89    Acute on chronic congestive heart failure with left ventricular diastolic dysfunction (HCC) I50.33    Lactic acidemia E87.2       Isolation/Infection:   Isolation            No Isolation          Patient Infection Status       Infection Onset Added Last Indicated Last Indicated By Review Planned Expiration Resolved Resolved By    None active    Resolved    COVID-19 (Rule Out) 05/06/22 05/06/22 05/06/22 Respiratory Panel, Molecular, with COVID-19 (Restricted: peds pts or suitable admitted adults) (Ordered)   05/06/22 Rule-Out Test Resulted    COVID-19 (Rule Out) 05/05/22 05/05/22 05/05/22 COVID-19, Rapid (Ordered)   05/05/22 Rule-Out Test Resulted    COVID-19 (Rule Out) 07/27/20 07/27/20 07/27/20 Covid-19 Ambulatory (Ordered)   07/29/20 Rule-Out Test Resulted            Nurse Assessment:  Last Vital Signs: BP (!) 140/82   Pulse 77   Temp 97.4 °F (36.3 °C) (Oral)   Resp 21   Ht 5' 3\" (1.6 m)   Wt 163 lb (73.9 kg)   SpO2 94%   BMI 28.87 kg/m²     Last documented pain score (0-10 scale): Pain Level: 7  Last Weight:   Wt Readings from Last 1 Encounters:   05/05/22 163 lb (73.9 kg)     Mental Status:  {IP PT MENTAL STATUS:20030}    IV Access:  { JANA IV ACCESS:892755924}    Nursing Mobility/ADLs:  Walking   {P DME CQIY:781500038}  Transfer  {P DME RBCX:003112939}  Bathing  {P DME WWIT:757969833}  Dressing  {P DME IGKT:811343424}  Toileting  {P DME KLIN:503606389}  Feeding  {P DME VRPO:049227826}  Med Admin  {CHP DME FYER:835378734}  Med Delivery   {[facility] [place] MED Delivery:092696864}    Wound Care Documentation and Therapy:        Elimination:  Continence:    Bowel: {YES / VC:15036}  Bladder: {YES / RC:45216}  Urinary Catheter: {Urinary Catheter:183199491}   Colostomy/Ileostomy/Ileal Conduit: {YES / EL:20222}       Date of Last BM: ***    Intake/Output Summary (Last 24 hours) at 2022 09  Last data filed at 2022 4379  Gross per 24 hour   Intake 324 ml   Output 2950 ml   Net -2626 ml     I/O last 3 completed shifts:   In: 2106 [P.O.:440; I.V.:1666]  Out: 0 [Urine:4600]    Safety Concerns:     [address] [employer] Safety Concerns:761115921}    Impairments/Disabilities:      [facility] Impairments/Disabilities:444853456}    Nutrition Therapy:  Current Nutrition Therapy:   [facility] JANA Diet List:491812204}    Routes of Feeding: {CHP DME Other Feedings:371795859}  Liquids: {Slp liquid thickness:89260}  Daily Fluid Restriction: {CHP DME Yes amt example:537954695}  Last Modified Barium Swallow with Video (Video Swallowing Test): {Done Not Done [facility]:088562520}    Treatments at the Time of Hospital Discharge:   Respiratory Treatments: ***  Oxygen Therapy:  {Therapy; copd oxygen:69132}  Ventilator:    {[facility] Vent KEGK:622151635}    Rehab Therapies: {THERAPEUTIC INTERVENTION:6614803034}  Weight Bearing Status/Restrictions: [facility] Weight Bearin}  Other Medical Equipment (for information only, NOT a DME order):  {EQUIPMENT:107486764}  Other Treatments: ***    Patient's personal belongings (please select all that are sent with patient):  {CHP DME Belongings:086165866}    RN SIGNATURE:  {Esignature:555053011}    CASE MANAGEMENT/SOCIAL WORK SECTION    Inpatient Status Date: ***    Readmission Risk Assessment Score:  Readmission Risk              Risk of Unplanned Readmission:  14           Discharging to Facility/ Agency   Name:   Address:  Phone:  Fax:    Dialysis Facility (if applicable)   Name:  Address:  Dialysis Schedule:  Phone:  Fax:    / signature: {Esignature:415710142}    PHYSICIAN SECTION    Prognosis: {Prognosis:3903984544}    Condition at Discharge: [facility] Patient Condition:557617061}    Rehab Potential (if transferring to Rehab): {Prognosis:9287441656}    Recommended Labs or Other Treatments After Discharge: ***    Physician Certification: I certify the above information and transfer of Rolo Conrad  is necessary for the continuing treatment of the diagnosis listed and that she requires {Admit to Appropriate Level of Care:23212} for {GREATER/LESS:809379912} 30 days.      Update Admission H&P: {CHP DME Changes in LSOPS:296506836}    PHYSICIAN SIGNATURE:  Electronically signed by Marline Adams MD on 5/8/22 at 9:14 AM EDT  Electronically signed by Marline Adams MD on 5/10/2022 at 10:33 AM

## 2022-05-08 NOTE — PROGRESS NOTES
Patient is pleasently confused and talking to people, unable to follow appropriate directions for Bi-Pap. Nurse aware of patients confusion and was aware that patient does not meet appropriate criteria to be on Bi-Pap.

## 2022-05-08 NOTE — PLAN OF CARE
Problem: Discharge Planning  Goal: Discharge to home or other facility with appropriate resources  Outcome: Not Progressing  Flowsheets (Taken 5/8/2022 1600)  Discharge to home or other facility with appropriate resources:   Identify barriers to discharge with patient and caregiver   Arrange for needed discharge resources and transportation as appropriate     Problem: Pain  Goal: Verbalizes/displays adequate comfort level or baseline comfort level  Outcome: Not Progressing     Problem: Safety - Adult  Goal: Free from fall injury  Outcome: Not Progressing     Problem: ABCDS Injury Assessment  Goal: Absence of physical injury  Outcome: Not Progressing     Problem: Skin/Tissue Integrity  Goal: Absence of new skin breakdown  Description: 1. Monitor for areas of redness and/or skin breakdown  2. Assess vascular access sites hourly  3. Every 4-6 hours minimum:  Change oxygen saturation probe site  4. Every 4-6 hours:  If on nasal continuous positive airway pressure, respiratory therapy assess nares and determine need for appliance change or resting period. Outcome: Not Progressing     Problem: Chronic Conditions and Co-morbidities  Goal: Patient's chronic conditions and co-morbidity symptoms are monitored and maintained or improved  Outcome: Not Progressing  Flowsheets (Taken 5/8/2022 1600)  Care Plan - Patient's Chronic Conditions and Co-Morbidity Symptoms are Monitored and Maintained or Improved: Monitor and assess patient's chronic conditions and comorbid symptoms for stability, deterioration, or improvement     Problem: Confusion  Goal: Confusion, delirium, dementia, or psychosis is improved or at baseline  Description: INTERVENTIONS:  1. Assess for possible contributors to thought disturbance, including medications, impaired vision or hearing, underlying metabolic abnormalities, dehydration, psychiatric diagnoses, and notify attending LIP  2. [place] high risk fall precautions, as indicated  3.  Provide frequent short contacts to provide reality reorientation, refocusing and direction  4. Decrease environmental stimuli, including noise as appropriate  5. Monitor and intervene to maintain adequate nutrition, hydration, elimination, sleep and activity  6. If unable to ensure safety without constant attention obtain sitter and review sitter guidelines with assigned personnel  7. Initiate Psychosocial CNS and Spiritual Care consult, as indicated  Outcome: Not Progressing  Flowsheets (Taken 5/8/2022 1600)  Effect of thought disturbance (confusion, delirium, dementia, or psychosis) are managed with adequate functional status:   Assess for contributors to thought disturbance, including medications, impaired vision or hearing, underlying metabolic abnormalities, dehydration, psychiatric diagnoses, notify [facility] high risk fall precautions, as indicated   Decrease environmental stimuli, including noise as appropriate   Monitor and intervene to maintain adequate nutrition, hydration, elimination, sleep and activity     Problem: Neurosensory - Adult  Goal: Achieves stable or improved neurological status  Outcome: Not Progressing  Flowsheets (Taken 5/8/2022 1600)  Achieves stable or improved neurological status:   Assess for and report changes in neurological status   Maintain blood pressure and fluid volume within ordered parameters to optimize cerebral perfusion and minimize risk of hemorrhage   Monitor temperature, glucose, and sodium.  Initiate appropriate interventions as ordered  Goal: Achieves maximal functionality and self care  Outcome: Not Progressing  Flowsheets (Taken 5/8/2022 1600)  Achieves maximal functionality and self care:   Monitor swallowing and airway patency with patient fatigue and changes in neurological status   Encourage and assist patient to increase activity and self care with guidance from physical therapy/occupational therapy     Problem: Respiratory - Adult  Goal: Achieves optimal ventilation and oxygenation  Outcome: Not Progressing  Flowsheets (Taken 5/8/2022 1600)  Achieves optimal ventilation and oxygenation:   Assess for changes in respiratory status   Assess for changes in mentation and behavior   Position to facilitate oxygenation and minimize respiratory effort   Respiratory therapy support as indicated     Problem: Genitourinary - Adult  Goal: Absence of urinary retention  Outcome: Not Progressing  Flowsheets (Taken 5/8/2022 1600)  Absence of urinary retention:   Assess patients ability to void and empty bladder   Monitor intake/output and perform bladder scan as needed     Problem: Infection - Adult  Goal: Absence of infection at discharge  Outcome: Not Progressing  Flowsheets (Taken 5/8/2022 1600)  Absence of infection at discharge:   Monitor lab/diagnostic results   Administer medications as ordered     Problem: Metabolic/Fluid and Electrolytes - Adult  Goal: Electrolytes maintained within normal limits  Outcome: Not Progressing  Flowsheets (Taken 5/8/2022 1600)  Electrolytes maintained within normal limits: Monitor labs and assess patient for signs and symptoms of electrolyte imbalances  Goal: Hemodynamic stability and optimal renal function maintained  Outcome: Not Progressing  Flowsheets (Taken 5/8/2022 1600)  Hemodynamic stability and optimal renal function maintained:   Monitor labs and assess for signs and symptoms of volume excess or deficit   Monitor intake, output and patient weight   Monitor response to interventions for patient's volume status, including labs, urine output, blood pressure (other measures as available)     Problem: Hematologic - Adult  Goal: Maintains hematologic stability  Outcome: Not Progressing  Flowsheets (Taken 5/8/2022 1600)  Maintains hematologic stability: Assess for signs and symptoms of bleeding or hemorrhage   Patient no longer on high flow O2. Patient on nasal cannula for oxygenation. Patient to have cardiac catheter on Monday May 9th. Daughter is aware. Case management involved for discharge planning of SNF vs home due to poor patient judgement/insight/safety awareness. negative...

## 2022-05-08 NOTE — CARE COORDINATION
Transitional Planning:  Spoke with daughter Radha Martel and floor nurse. Pt is more confused. Daughter requesting SNF. Choices  1. [address]  2. [facility]. Later confirmed with pt who agrees. Referrals sent.

## 2022-05-09 ENCOUNTER — APPOINTMENT (OUTPATIENT)
Dept: CARDIAC CATH/INVASIVE PROCEDURES | Age: 72
DRG: 853 | End: 2022-05-09
Payer: MEDICARE

## 2022-05-09 LAB
ANION GAP SERPL CALCULATED.3IONS-SCNC: 10 MMOL/L (ref 9–17)
BUN BLDV-MCNC: 14 MG/DL (ref 8–23)
CALCIUM SERPL-MCNC: 9.4 MG/DL (ref 8.6–10.4)
CHLORIDE BLD-SCNC: 105 MMOL/L (ref 98–107)
CO2: 25 MMOL/L (ref 20–31)
CREAT SERPL-MCNC: 0.48 MG/DL (ref 0.5–0.9)
GFR AFRICAN AMERICAN: >60 ML/MIN
GFR NON-AFRICAN AMERICAN: >60 ML/MIN
GFR SERPL CREATININE-BSD FRML MDRD: ABNORMAL ML/MIN/{1.73_M2}
GLUCOSE BLD-MCNC: 146 MG/DL (ref 70–99)
HCT VFR BLD CALC: 35.7 % (ref 36.3–47.1)
HEMOGLOBIN: 12.3 G/DL (ref 11.9–15.1)
MCH RBC QN AUTO: 34.9 PG (ref 25.2–33.5)
MCHC RBC AUTO-ENTMCNC: 34.5 G/DL (ref 28.4–34.8)
MCV RBC AUTO: 101.4 FL (ref 82.6–102.9)
NRBC AUTOMATED: 0 PER 100 WBC
PARTIAL THROMBOPLASTIN TIME: 67 SEC (ref 20.5–30.5)
PARTIAL THROMBOPLASTIN TIME: 77.1 SEC (ref 20.5–30.5)
PDW BLD-RTO: 14.6 % (ref 11.8–14.4)
PLATELET # BLD: 268 K/UL (ref 138–453)
PMV BLD AUTO: 10 FL (ref 8.1–13.5)
POTASSIUM SERPL-SCNC: 3.7 MMOL/L (ref 3.7–5.3)
RBC # BLD: 3.52 M/UL (ref 3.95–5.11)
SODIUM BLD-SCNC: 140 MMOL/L (ref 135–144)
WBC # BLD: 8.7 K/UL (ref 3.5–11.3)

## 2022-05-09 PROCEDURE — C9600 PERC DRUG-EL COR STENT SING: HCPCS

## 2022-05-09 PROCEDURE — 85027 COMPLETE CBC AUTOMATED: CPT

## 2022-05-09 PROCEDURE — C1725 CATH, TRANSLUMIN NON-LASER: HCPCS

## 2022-05-09 PROCEDURE — 027034Z DILATION OF CORONARY ARTERY, ONE ARTERY WITH DRUG-ELUTING INTRALUMINAL DEVICE, PERCUTANEOUS APPROACH: ICD-10-PCS | Performed by: INTERNAL MEDICINE

## 2022-05-09 PROCEDURE — 6370000000 HC RX 637 (ALT 250 FOR IP): Performed by: STUDENT IN AN ORGANIZED HEALTH CARE EDUCATION/TRAINING PROGRAM

## 2022-05-09 PROCEDURE — 6360000002 HC RX W HCPCS: Performed by: STUDENT IN AN ORGANIZED HEALTH CARE EDUCATION/TRAINING PROGRAM

## 2022-05-09 PROCEDURE — 93005 ELECTROCARDIOGRAM TRACING: CPT | Performed by: STUDENT IN AN ORGANIZED HEALTH CARE EDUCATION/TRAINING PROGRAM

## 2022-05-09 PROCEDURE — 80048 BASIC METABOLIC PNL TOTAL CA: CPT

## 2022-05-09 PROCEDURE — 2580000003 HC RX 258: Performed by: STUDENT IN AN ORGANIZED HEALTH CARE EDUCATION/TRAINING PROGRAM

## 2022-05-09 PROCEDURE — 36415 COLL VENOUS BLD VENIPUNCTURE: CPT

## 2022-05-09 PROCEDURE — 94640 AIRWAY INHALATION TREATMENT: CPT

## 2022-05-09 PROCEDURE — 2709999900 HC NON-CHARGEABLE SUPPLY

## 2022-05-09 PROCEDURE — 93458 L HRT ARTERY/VENTRICLE ANGIO: CPT

## 2022-05-09 PROCEDURE — C1887 CATHETER, GUIDING: HCPCS

## 2022-05-09 PROCEDURE — 6370000000 HC RX 637 (ALT 250 FOR IP)

## 2022-05-09 PROCEDURE — B2111ZZ FLUOROSCOPY OF MULTIPLE CORONARY ARTERIES USING LOW OSMOLAR CONTRAST: ICD-10-PCS | Performed by: INTERNAL MEDICINE

## 2022-05-09 PROCEDURE — 2700000000 HC OXYGEN THERAPY PER DAY

## 2022-05-09 PROCEDURE — 6360000004 HC RX CONTRAST MEDICATION

## 2022-05-09 PROCEDURE — C1874 STENT, COATED/COV W/DEL SYS: HCPCS

## 2022-05-09 PROCEDURE — 2500000003 HC RX 250 WO HCPCS

## 2022-05-09 PROCEDURE — 4A023N8 MEASUREMENT OF CARDIAC SAMPLING AND PRESSURE, BILATERAL, PERCUTANEOUS APPROACH: ICD-10-PCS | Performed by: INTERNAL MEDICINE

## 2022-05-09 PROCEDURE — 6360000002 HC RX W HCPCS

## 2022-05-09 PROCEDURE — 85730 THROMBOPLASTIN TIME PARTIAL: CPT

## 2022-05-09 PROCEDURE — C1769 GUIDE WIRE: HCPCS

## 2022-05-09 PROCEDURE — APPSS30 APP SPLIT SHARED TIME 16-30 MINUTES: Performed by: NURSE PRACTITIONER

## 2022-05-09 PROCEDURE — 2060000000 HC ICU INTERMEDIATE R&B

## 2022-05-09 PROCEDURE — C1894 INTRO/SHEATH, NON-LASER: HCPCS

## 2022-05-09 PROCEDURE — 99232 SBSQ HOSP IP/OBS MODERATE 35: CPT | Performed by: INTERNAL MEDICINE

## 2022-05-09 RX ORDER — SODIUM CHLORIDE 0.9 % (FLUSH) 0.9 %
5-40 SYRINGE (ML) INJECTION EVERY 12 HOURS SCHEDULED
Status: DISCONTINUED | OUTPATIENT
Start: 2022-05-09 | End: 2022-05-10 | Stop reason: HOSPADM

## 2022-05-09 RX ORDER — DIPHENHYDRAMINE HCL 25 MG
25 TABLET ORAL ONCE
Status: COMPLETED | OUTPATIENT
Start: 2022-05-09 | End: 2022-05-09

## 2022-05-09 RX ORDER — ACETAMINOPHEN 325 MG/1
650 TABLET ORAL EVERY 4 HOURS PRN
Status: DISCONTINUED | OUTPATIENT
Start: 2022-05-09 | End: 2022-05-09 | Stop reason: SDUPTHER

## 2022-05-09 RX ORDER — SODIUM CHLORIDE 9 MG/ML
INJECTION, SOLUTION INTRAVENOUS PRN
Status: DISCONTINUED | OUTPATIENT
Start: 2022-05-09 | End: 2022-05-10 | Stop reason: HOSPADM

## 2022-05-09 RX ORDER — PREDNISONE 20 MG/1
40 TABLET ORAL DAILY
Status: DISCONTINUED | OUTPATIENT
Start: 2022-05-09 | End: 2022-05-10 | Stop reason: HOSPADM

## 2022-05-09 RX ORDER — POTASSIUM CHLORIDE 20 MEQ/1
40 TABLET, EXTENDED RELEASE ORAL ONCE
Status: COMPLETED | OUTPATIENT
Start: 2022-05-09 | End: 2022-05-09

## 2022-05-09 RX ORDER — SODIUM CHLORIDE 0.9 % (FLUSH) 0.9 %
5-40 SYRINGE (ML) INJECTION PRN
Status: DISCONTINUED | OUTPATIENT
Start: 2022-05-09 | End: 2022-05-10 | Stop reason: HOSPADM

## 2022-05-09 RX ADMIN — BUSPIRONE HYDROCHLORIDE 7.5 MG: 5 TABLET ORAL at 09:40

## 2022-05-09 RX ADMIN — METHYLPREDNISOLONE SODIUM SUCCINATE 40 MG: 40 INJECTION, POWDER, FOR SOLUTION INTRAMUSCULAR; INTRAVENOUS at 02:26

## 2022-05-09 RX ADMIN — DIPHENHYDRAMINE HCL 25 MG: 25 TABLET ORAL at 21:38

## 2022-05-09 RX ADMIN — BUDESONIDE AND FORMOTEROL FUMARATE DIHYDRATE 2 PUFF: 160; 4.5 AEROSOL RESPIRATORY (INHALATION) at 07:33

## 2022-05-09 RX ADMIN — ASPIRIN 81 MG: 81 TABLET, CHEWABLE ORAL at 09:40

## 2022-05-09 RX ADMIN — IPRATROPIUM BROMIDE AND ALBUTEROL SULFATE 1 AMPULE: .5; 3 SOLUTION RESPIRATORY (INHALATION) at 07:33

## 2022-05-09 RX ADMIN — BUSPIRONE HYDROCHLORIDE 7.5 MG: 5 TABLET ORAL at 21:39

## 2022-05-09 RX ADMIN — DESMOPRESSIN ACETATE 40 MG: 0.2 TABLET ORAL at 09:40

## 2022-05-09 RX ADMIN — POTASSIUM CHLORIDE 40 MEQ: 1500 TABLET, EXTENDED RELEASE ORAL at 09:39

## 2022-05-09 RX ADMIN — IPRATROPIUM BROMIDE AND ALBUTEROL SULFATE 1 AMPULE: .5; 3 SOLUTION RESPIRATORY (INHALATION) at 11:26

## 2022-05-09 RX ADMIN — IPRATROPIUM BROMIDE AND ALBUTEROL SULFATE 1 AMPULE: .5; 3 SOLUTION RESPIRATORY (INHALATION) at 20:07

## 2022-05-09 RX ADMIN — BUDESONIDE AND FORMOTEROL FUMARATE DIHYDRATE 2 PUFF: 160; 4.5 AEROSOL RESPIRATORY (INHALATION) at 20:07

## 2022-05-09 RX ADMIN — PREDNISONE 40 MG: 20 TABLET ORAL at 18:12

## 2022-05-09 RX ADMIN — FUROSEMIDE 20 MG: 10 INJECTION, SOLUTION INTRAMUSCULAR; INTRAVENOUS at 09:40

## 2022-05-09 RX ADMIN — QUETIAPINE FUMARATE 25 MG: 25 TABLET ORAL at 21:39

## 2022-05-09 RX ADMIN — HEPARIN SODIUM 22.06 UNITS/KG/HR: 5000 INJECTION, SOLUTION INTRAVENOUS; SUBCUTANEOUS at 14:19

## 2022-05-09 ASSESSMENT — PAIN DESCRIPTION - ORIENTATION: ORIENTATION: RIGHT

## 2022-05-09 ASSESSMENT — PAIN DESCRIPTION - PAIN TYPE
TYPE: CHRONIC PAIN
TYPE: CHRONIC PAIN

## 2022-05-09 ASSESSMENT — PAIN SCALES - GENERAL
PAINLEVEL_OUTOF10: 0
PAINLEVEL_OUTOF10: 2
PAINLEVEL_OUTOF10: 3

## 2022-05-09 ASSESSMENT — PAIN DESCRIPTION - DESCRIPTORS
DESCRIPTORS: ACHING
DESCRIPTORS: ACHING

## 2022-05-09 ASSESSMENT — PAIN DESCRIPTION - ONSET
ONSET: ON-GOING
ONSET: ON-GOING

## 2022-05-09 ASSESSMENT — PAIN DESCRIPTION - LOCATION
LOCATION: HIP;KNEE;LEG
LOCATION: HIP;KNEE;LEG

## 2022-05-09 ASSESSMENT — PAIN DESCRIPTION - FREQUENCY
FREQUENCY: CONTINUOUS
FREQUENCY: CONTINUOUS

## 2022-05-09 NOTE — PROGRESS NOTES
Occupational [address]  Occupational Therapy Not Seen Note    DATE: 2022    NAME: Rolo Conrad  MRN: [id number]   : 1950      Patient not seen this date for Occupational Therapy due to:      Pt off the unit at cath lab at this time.        Next Scheduled Treatment: Will check back 5/10/2022 as able    Electronically signed by Alvarado Ashley OT on 2022 at 1:13 PM

## 2022-05-09 NOTE — PROGRESS NOTES
Physical Therapy        Physical Therapy Cancel Note      DATE: 2022    NAME: Tony Hilario  MRN: [id number]   : 1950      Patient not seen this date for Physical Therapy due to: Other: Pt currently off the floor for heart cath.       Electronically signed by Rhonda Rojo PTA on 2022 at 1:30 PM impairments found

## 2022-05-09 NOTE — PROGRESS NOTES
[facility]  Internal Medicine Teaching Residency Program  Inpatient Daily Progress Note  ______________________________________________________________________________    Patient: Jonathan Shah  YOB: 1950   [facility] [place]:[id number]    Acct: [de-identified]     Room: [facility]-  Admit date: 5/5/2022  Today's date: 05/09/22  Number of days in the hospital: 4    SUBJECTIVE   Admitting Diagnosis: Pneumonia  CC: AMS SOB   Pt examined at bedside. Chart & results reviewed. More alert this AM. Patient is going for cath today. Tolerating 3L NC.   ROS:  Constitutional:  negative for chills, fevers, sweats  Respiratory:  Cough and SOB  Cardiovascular:  negative for chest pain, chest pressure/discomfort, lower extremity edema, palpitations  Gastrointestinal:  negative for abdominal pain, constipation, diarrhea, nausea, vomiting  Neurological:  negative for dizziness, headache    BRIEF HISTORY     The patient is a pleasant 80 y.o. female pmh COPD on 2.5L at home, breast cancer, CHF  presents with a chief complaint of AMS. Patient was evaluated by EMS and found her satting in the low 80s. Patient was placed on O2 and taken to ED. Patient is oriented to person and place but unable to tell me what the year is. Patient's daughter reports that she has been having pneumonia for the past month and is being seen in [facility] clinic but is unsure what abx she is on. She was febrile upon arrival to ED. Tmax is 100.4. patient is a breast cancer patient who is s/p lumpectomy and currently receiving chemotherapy. CXR showed bilateral infiltrates suspicious of pneumonia. CT imaging bilateral patchy airspace disease more pronounced on the left. Labs remarkable for leukocytosis of 21.9 and troponins of 570. Patient is currently not complaining of any chest pain,n/v. Will repeat and continue to trend.  Currently a 1 ppd smoker, no alcohol, no illegal drugs    OBJECTIVE     Vital Signs:  /73   Pulse 62   Temp 97.7 °F (36.5 °C) (Oral)   Resp 16   Ht 5' 3\" (1.6 m)   Wt 163 lb (73.9 kg)   SpO2 (!) 89%   BMI 28.87 kg/m²     Temp (24hrs), Av.7 °F (36.5 °C), Min:97 °F (36.1 °C), Max:97.9 °F (36.6 °C)    In: 479.8   Out: 800 [Urine:800]    Physical Exam:  Physical Exam  Constitutional:       General: She is not in acute distress. Appearance: Normal appearance. She is not ill-appearing, toxic-appearing or diaphoretic. HENT:      Mouth/Throat:      Mouth: Mucous membranes are moist.      Pharynx: Oropharynx is clear. Eyes:      General: No scleral icterus. Cardiovascular:      Rate and Rhythm: Normal rate and regular rhythm. Pulses: Normal pulses. Heart sounds: Normal heart sounds. No murmur heard. No friction rub. No gallop. Pulmonary:      Effort: Pulmonary effort is normal.      Breath sounds: Normal breath sounds. Abdominal:      General: Abdomen is flat. Bowel sounds are normal. There is no distension. Palpations: Abdomen is soft. There is no mass. Tenderness: There is no abdominal tenderness. There is no guarding or rebound. Hernia: No hernia is present. Musculoskeletal:         General: No swelling, tenderness, deformity or signs of injury. Right lower leg: No edema. Left lower leg: No edema. Skin:     General: Skin is warm and dry. Coloration: Skin is not jaundiced or pale. Findings: No bruising. Neurological:      General: No focal deficit present. Mental Status: She is alert. Mental status is at baseline. Cranial Nerves: No cranial nerve deficit. Motor: No weakness.            Medications:  Scheduled Medications:    potassium chloride  40 mEq Oral Once    QUEtiapine  25 mg Oral Nightly    methylPREDNISolone  40 mg IntraVENous Q12H    diclofenac sodium  2 g Topical BID    furosemide  20 mg IntraVENous Daily    ipratropium-albuterol  1 ampule Inhalation Q4H WA    aspirin  81 mg Oral Daily    budesonide-formoterol  2 puff Inhalation BID    atorvastatin  40 mg Oral Daily    busPIRone  7.5 mg Oral BID    sodium chloride flush  5-40 mL IntraVENous 2 times per day     Continuous Infusions:    sodium chloride      heparin (PORCINE) Infusion 22 Units/kg/hr (05/09/22 0225)     PRN Medicationsmelatonin, 5 mg, Nightly PRN  ibuprofen, 400 mg, Q6H PRN  albuterol, 2.5 mg, Q6H PRN  docusate sodium, 100 mg, BID PRN  sodium chloride flush, 5-40 mL, PRN  sodium chloride, , PRN  ondansetron, 4 mg, Q8H PRN   Or  ondansetron, 4 mg, Q6H PRN  polyethylene glycol, 17 g, Daily PRN  acetaminophen, 650 mg, Q6H PRN   Or  acetaminophen, 650 mg, Q6H PRN  heparin (porcine), 4,000 Units, PRN  heparin (porcine), 2,000 Units, PRN        Diagnostic Labs:  CBC:   Recent Labs     05/07/22  0333 05/09/22  0114   WBC 8.3 8.7   RBC 3.57* 3.52*   HGB 12.3 12.3   HCT 37.2 35.7*   .2* 101.4   RDW 14.8* 14.6*    268     BMP:   Recent Labs     05/07/22  0333 05/08/22  0249 05/09/22  0114    140 140   K 3.4* 3.9 3.7    106 105   CO2 22 24 25   BUN 14 11 14   CREATININE 0.57 0.41* 0.48*     BNP: No results for input(s): BNP in the last 72 hours. PT/INR:   No results for input(s): PROTIME, INR in the last 72 hours. APTT:   Recent Labs     05/08/22  0904 05/08/22  1739 05/09/22  0114   APTT 53.9* 43.5* 77.1*     CARDIAC ENZYMES: No results for input(s): CKMB, CKMBINDEX, TROPONINI in the last 72 hours. Invalid input(s): CKTOTAL;3  FASTING LIPID PANEL:No results found for: CHOL, HDL, TRIG  LIVER PROFILE:   No results for input(s): AST, ALT, ALB, BILIDIR, BILITOT, ALKPHOS in the last 72 hours. MICROBIOLOGY:   Lab Results   Component Value Date/Time    CULTURE NO GROWTH 3 DAYS 05/05/2022 05:20 PM       Imaging:    CT HEAD WO CONTRAST    Result Date: 5/5/2022  Mild central and cortical cerebral atrophy. Mild chronic deep white matter ischemic changes No acute intracranial abnormalities are noted.      CT ABDOMEN PELVIS W IV CONTRAST Additional Contrast? None    Result Date: 5/5/2022  1. Motion limited evaluation with no evidence of acute pulmonary embolus to the segmental level. 2. Patchy bilateral airspace opacities, more prominent on the left. Imaging features can be seen with COVID-19 pneumonia, though are nonspecific and can occur with a variety of infectious and noninfectious processes. PneInd 3. No acute abdominopelvic findings. 4. Unchanged 3.1 cm fusiform infrarenal abdominal aortic aneurysm for which follow-up is recommended in 3 years. 5. Asymmetric 3.8 cm soft tissue density in the left breast for which nonemergent mammographic correlation is recommended. 6. Incidental findings including coronary artery disease, moderate to severe atherosclerosis, diverticulosis and osteopenia. XR CHEST PORTABLE    Result Date: 5/5/2022  Diffuse bilateral interstitial opacities which may be related to pulmonary edema or atypical/viral infection. CT CHEST PULMONARY EMBOLISM W CONTRAST    Result Date: 5/5/2022  1. Motion limited evaluation with no evidence of acute pulmonary embolus to the segmental level. 2. Patchy bilateral airspace opacities, more prominent on the left. Imaging features can be seen with COVID-19 pneumonia, though are nonspecific and can occur with a variety of infectious and noninfectious processes. PneInd 3. No acute abdominopelvic findings. 4. Unchanged 3.1 cm fusiform infrarenal abdominal aortic aneurysm for which follow-up is recommended in 3 years. 5. Asymmetric 3.8 cm soft tissue density in the left breast for which nonemergent mammographic correlation is recommended. 6. Incidental findings including coronary artery disease, moderate to severe atherosclerosis, diverticulosis and osteopenia.        ASSESSMENT & PLAN     Assessment and Plan:    Principal Problem:    Pneumonia  Active Problems:    Sepsis ([address]    Megaloblastic anemia    Hypoalbuminemia    Puerperal sepsis with acute hypoxic respiratory failure (HCC)    Macrocytosis without anemia    Acute on chronic congestive heart failure with left ventricular diastolic dysfunction (HCC)    Lactic acidemia    Acute delirium    Acute exacerbation of chronic obstructive pulmonary disease (COPD) ([facility]    Coronary artery disease involving native coronary artery of native heart without angina pectoris    Benign essential HTN    Carcinoma of upper-outer quadrant of left breast in female, estrogen receptor positive ([address]  Resolved Problems:    * No resolved hospital problems. *      Pneumonia   - ID recommends monitor off abx.   - f/u resp and blood cultures- continues to be negative      Sepsis  - trend lactate  - monitor leukocytosis. Has resolved currently 8.3      COPD  - resume home inhalers  - supplemental O2 as needed. On HFNC 11L   - montior respiratory status   - solumedrol 40mg Q8      Breast cancer  - per chart currently on anastrozole  - s/p partial breast irradiation  - s/p lumpectomy      CAD  Elevated troponin  - troponin are 570, heparin gtt started  - continue to trend  - cardio consulted. Will take for cath on Monday   - keep NPO until after procedure  - EKG NSR per ED      Smoker  - recommend cessation     CHF  - grade 2 diastolic heart failure according to echo in 2019  - continue lasix 20 daily     AAA  - 3.1 x 2.7 fusiform infrarenal AAA, unchanged from prior imaging  - will need fu OP       Navid Harley MD  Internal Medicine Resident  [address] [facility]  5/9/2022 7:50 AM          I have discussed the care of Bonnie Whaleych , including pertinent history and exam findings,    today with the resident.   .     Principal Problem:    Pneumonia  Active Problems:    Sepsis ([facility] [address]    Megaloblastic anemia    Hypoalbuminemia    Puerperal sepsis with acute hypoxic respiratory failure (HCC)    Macrocytosis without anemia    Acute on chronic congestive heart failure with left ventricular diastolic dysfunction (HCC)    Lactic acidemia    Acute delirium    Acute exacerbation of chronic obstructive pulmonary disease (COPD) ([facility] [address]    Coronary artery disease involving native coronary artery of native heart without angina pectoris    Benign essential HTN    Carcinoma of upper-outer quadrant of left breast in female, estrogen receptor positive ([facility] [address]  Resolved Problems:    * No resolved hospital problems. *        Overall  course ;                                   are improving over time.         I could not see the patient physically today, she was in cardiac cath  Reviewed data, patient had drug-eluting stent placed  We will try to wean oxygen  Work on discharge planning once okay with cardio          Electronically signed by Ish Boateng MD

## 2022-05-09 NOTE — PROGRESS NOTES
[facility] - Progress Note  Today's Date and Time: 5/9/2022, 11:22 AM    Impression :   · COPD on home 02 @ 2.5 L/min  · Acute on chronic hypoxic respiratory failure   · CHF  · CAD  · Elevated troponins with CAD  · NSTEMI  · Benign essential HTN  · Carcinoma of upper-outer quadrant of left breast in female, estrogen receptor positive     Recommendations:   · Monitor off antibiotics  · Continue to Treat CHF    Medical Decision Making/Summary/Discussion:5/9/2022     ·   Infection Control Recommendations   · [place] Precautions      Antimicrobial Stewardship Recommendations     · Discontinuation of therapy  Coordination of Outpatient Care:   · Estimated Length of IV antimicrobials: 5-6-22  · Patient will need Midline Catheter Insertion: no  · Patient will need PICC line Insertion:No  · Patient will need: Home IV , [place],  SNF,  LTAC: TBD  · Patient will need outpatient wound care:No    Chief complaint/reason for consultation:   · Concern for pneumonia      History of Present Illness:   Armani Christie is a 80y.o.-year-old  female who was initially admitted on 5/5/2022. Patient seen at the request of Isabella Monzon. INITIAL HISTORY:    Patient presented through ER with complaints of altered mental status secondary to hypoxia. Family indicated that she has Hx of CAD, COPD, CHF, HTN, Irritable bowel syndrome, peptic ulcer disease. Patient is on home 02 @ 2.5 L/min. Family reported that she had signs of a respiratory illness for about 2 weeks prior to being found by her  with altered mental status. EMS documented she was hypoxic prior to transport to ER with 02 sats in the 80's. In the ER patient was afebrile, hypoxic. She required non re-breather-->BiPAP-->High Flow. CXR:  · 5-5-22: Diffuse bilateral interstitial opacities which may be related to pulmonary edema or atypical/viral infection    CT:  · 5-5-22: Infrarenal AAA.  Diffuse interstitial opacities, more prominent on Lt. Asymmetric Lt breast density not appreciated on CT 4-23-21    Clinical data, Hx, X rays point towards CHF leading to fluid retention with interstitial edema. Laboratory tests point away from infectious pneumonia    CURRENT EVALUATION 5/9/2022    Afebrile  VS stable    The patient is on 5 L NC today and will be undergoing a cardiac cath. She wears 2 to 3 L NC at home    She is alert but disoriented. Leukocytosis has resolved. No acute issues noted    Labs, X rays reviewed: 5/9/2022    BUN:19-->14  Cr:0.64-->0.48    WBC: 21.9-->16.5-->8.7  Hb:12.3-->12.3  Plat: 250-->268    Troponin 453, 570  Pro BNP 6463    Cultures:  Urine:  · 5-5-22: No growth   Blood:  · 5-5-22: No growth   Sputum :  ·   Wound:  ·   Respiratory PCR: negative   Influenza A & B: negative      Discussed with patient, RN, family. 5-5-22: I have personally reviewed the past medical history, past surgical history, medications, social history, and family history, and I have updated the database accordingly. Past Medical History:     Past Medical History:   Diagnosis Date    Acid reflux     Anxiety and depression     Asthma     CAD (coronary artery disease)     per medical record. Pt follows-up with Dr. Parminder Gonsalez CHF (congestive heart failure) ([address]     per medical record    COPD (chronic obstructive pulmonary disease) ([address]     Hyperlipidemia     Hypertension     IBS (irritable bowel syndrome)     MI (myocardial infarction) ([address]     On home O2     at night per pt. 2.5 Liters    Osteoarthritis     Peptic ulcer     PNA (pneumonia) 2019       Past Surgical  History:     Past Surgical History:   Procedure Laterality Date    CARDIAC CATHETERIZATION  2017    per medical record. No stents placed per pt.     CERVICAL FUSION      COLONOSCOPY N/A 7/31/2020    COLONOSCOPY HOT SNARE BIOPSY performed by Rosa Ramirez MD at [address] [place] [address] 7/31/2020    EGD BIOPSY WITH SAVORY DILATION performed by Beryle Dresser, MD at [address] [facility] 6/24/2021    EGD BIOPSY performed by Dina Rivera MD at [address] [facility]       Medications:      QUEtiapine  25 mg Oral Nightly    methylPREDNISolone  40 mg IntraVENous Q12H    diclofenac sodium  2 g Topical BID    furosemide  20 mg IntraVENous Daily    ipratropium-albuterol  1 ampule Inhalation Q4H WA    aspirin  81 mg Oral Daily    budesonide-formoterol  2 puff Inhalation BID    atorvastatin  40 mg Oral Daily    busPIRone  7.5 mg Oral BID    sodium chloride flush  5-40 mL IntraVENous 2 times per day       Social History:     Social History     Socioeconomic History    Marital status: Single     Spouse name: Not on file    Number of children: Not on file    Years of education: Not on file    Highest education level: Not on file   Occupational History    Not on file   Tobacco Use    Smoking status: Current Every Day Smoker     Packs/day: 0.25     Years: 40.00     Pack years: 10.00     Types: Cigarettes    Smokeless tobacco: Never Used    Tobacco comment: pt on chantix   Vaping Use    Vaping Use: Never used   Substance and Sexual Activity    Alcohol use: No     Alcohol/week: 0.0 standard drinks    Drug use: No    Sexual activity: Not on file   Other Topics Concern    Not on file   Social History Narrative    Not on file     Social Determinants of Health     Financial Resource Strain:     Difficulty of Paying Living Expenses: Not on file   Food Insecurity:     Worried About Running Out of Food in the Last Year: Not on file    Juan of Food in the Last Year: Not on file   Transportation Needs:     Lack of Transportation (Medical): Not on file    Lack of Transportation (Non-Medical):  Not on file   Physical Activity:     Days of Exercise per Week: Not on file    Minutes of Exercise per Session: Not on file   Stress:     Feeling of Stress : Not on file   Social Connections:     Frequency of Communication with Friends and Family: Not on file    Frequency of Social Gatherings with Friends and Family: Not on file    Attends Evangelical Services: Not on file    Active Member of Clubs or Organizations: Not on file    Attends Club or Organization Meetings: Not on file    Marital Status: Not on file   Intimate Partner Violence:     Fear of Current or Ex-Partner: Not on file    Emotionally Abused: Not on file    Physically Abused: Not on file    Sexually Abused: Not on file   Housing Stability:     Unable to Pay for Housing in the Last Year: Not on file    Number of [place] in the Last Year: Not on file    Unstable Housing in the Last Year: Not on file       Family History:   No family history on file. Allergies:   Sulfa antibiotics     Review of Systems:   Constitutional: No fevers or chills. SOB  Head: No headaches  Eyes: No double vision or blurry vision. No conjunctival inflammation. ENT: No sore throat or runny nose. . No hearing loss, tinnitus or vertigo. Cardiovascular: No chest pain or palpitations. Shortness of breath. [place]  Lung: Shortness of breath, dry cough. No sputum production  Abdomen: No nausea, vomiting, diarrhea, or abdominal pain. [place] No cramps. Genitourinary: No increased urinary frequency, or dysuria. No hematuria. No suprapubic or CVA pain  Musculoskeletal: No muscle aches or pains. No joint effusions, swelling or deformities  Hematologic: No bleeding or bruising. Neurologic: No headache, weakness, numbness, or tingling. Integument: No rash, no ulcers. Psychiatric: No depression. Endocrine: No polyuria, no polydipsia, no polyphagia.     Physical Examination :     Patient Vitals for the past 8 hrs:   BP Temp Temp src Pulse Resp SpO2   05/09/22 1111 115/62 97.3 °F (36.3 °C) Oral 58 21 --   05/09/22 0733 -- -- -- -- 16 (!) 89 %   05/09/22 0721 124/73 97.7 °F (36.5 °C) Oral 62 25 92 %   05/09/22 0600 -- -- -- 77 19 90 %   05/09/22 0500 -- -- -- 75 21 (!) 89 %   05/09/22 0400 119/79 97.9 °F (36.6 °C) Oral 77 16 91 %     General Appearance: Awake, alert, and in no apparent distress  Head:  Normocephalic, no trauma  Eyes: Pupils equal, round, reactive to light and accommodation; extraocular movements intact; sclera anicteric; conjunctivae pink. No embolic phenomena. ENT: Oropharynx clear, without erythema, exudate, or thrush. No tenderness of sinuses. Mouth/throat: mucosa pink and moist. No lesions. Dentures  Neck:Supple, without lymphadenopathy. Thyroid normal, No bruits. Pulmonary/Chest:  Fine crackles  Cardiovascular: Regular rate and rhythm without murmurs, rubs, or gallops. Abdomen: Soft, non tender. Bowel sounds normal. No organomegaly  All four Extremities: No cyanosis, clubbing, edema, or effusions. Neurologic: Disoriented  Skin: Warm and dry with good turgor. No signs of peripheral arterial or venous insufficiency. No ulcerations. No open wounds. Medical Decision Making -Laboratory:   I have independently reviewed/ordered the following labs:    CBC with Differential:   Recent Labs     05/07/22  0333 05/09/22  0114   WBC 8.3 8.7   HGB 12.3 12.3   HCT 37.2 35.7*    268     BMP:   Recent Labs     05/08/22  0249 05/09/22  0114    140   K 3.9 3.7    105   CO2 24 25   BUN 11 14   CREATININE 0.41* 0.48*     Hepatic Function Panel:   No results for input(s): PROT, LABALBU, BILIDIR, IBILI, BILITOT, ALKPHOS, ALT, AST in the last 72 hours. No results for input(s): RPR in the last 72 hours. No results for input(s): HIV in the last 72 hours. No results for input(s): BC in the last 72 hours.   Lab Results   Component Value Date    MUCUS 2+ 05/05/2022    RBC 3.52 05/09/2022    WBC 8.7 05/09/2022    TURBIDITY Clear 05/05/2022     Lab Results   Component Value Date    CREATININE 0.48 05/09/2022    GLUCOSE 146 05/09/2022       Medical Decision Making-Imaging:     EXAMINATION:   CTA OF THE CHEST; CT OF THE ABDOMEN AND PELVIS WITH CONTRAST 5/5/2022 5:20 pm       TECHNIQUE:   CTA of the chest was performed after the administration of intravenous   contrast.  Multiplanar reformatted images are provided for review.  MIP   images are provided for review. Dose modulation, iterative reconstruction,   and/or weight based adjustment of the mA/kV was utilized to reduce the   radiation dose to as low as reasonably achievable.; CT of the abdomen and   pelvis was performed with the administration of intravenous contrast.   Multiplanar reformatted images are provided for review. Dose modulation,   iterative reconstruction, and/or weight based adjustment of the mA/kV was   utilized to reduce the radiation dose to as low as reasonably achievable.       COMPARISON:   CT of the abdomen and pelvis, 04/23/2021       HISTORY:   ORDERING SYSTEM PROVIDED HISTORY: rule out PE   TECHNOLOGIST PROVIDED HISTORY:   rule out PE   Decision Support Exception - unselect if not a suspected or confirmed   emergency medical condition->Emergency Medical Condition (MA); ORDERING   SYSTEM PROVIDED HISTORY: PAIN   TECHNOLOGIST PROVIDED HISTORY:   PAIN       Decision Support Exception - unselect if not a suspected or confirmed   emergency medical condition->Emergency Medical Condition (MA)       FINDINGS:       Chest:       Pulmonary arteries: The pulmonary arteries are adequately opacified for   evaluation.  Evaluation is limited by respiratory motion.  There is no   evidence of acute pulmonary embolus to the segmental level.  The main   pulmonary artery is normal in caliber.       Mediastinum: There is no mediastinal adenopathy.  The heart and pericardium   are without acute abnormality.  Coronary artery calcifications are present.    The thoracic aorta is normal in caliber.  Mild atherosclerotic plaque.       Lungs/pleura: Respiratory motion slightly limits evaluation.  There are   patchy bilateral ground-glass opacities, slightly more prominent in the left   upper lobe.  No effusion or pneumothorax.  Limited evaluation for small   pulmonary nodules.       Soft Tissues/Bones: Asymmetric soft tissue density in the left breast which   measures approximately 3.8 x 2.7 cm.  No axillary or supraclavicular   lymphadenopathy.  No acute bony abnormality.  Partially seen cervical fusion   hardware.           Abdomen/Pelvis:       Organs: Mildly motion limited evaluation of the upper abdomen.  The liver,   spleen, pancreas, kidneys and adrenal glands are without acute findings.  The   gallbladder is present without radiopaque cholelithiasis.       GI/Bowel: No mechanical bowel obstruction.  Normal appendix.  A few scattered   diverticula are present.       Pelvis: Urinary bladder is nondistended and not well evaluated.  A Blanco   catheter is present.  Status post hysterectomy.  No adnexal mass.       Peritoneum/Retroperitoneum: No ascites or pneumoperitoneum.  No   retroperitoneal or mesenteric lymphadenopathy.  Moderate to severe   atherosclerotic plaque.  There is a 3.1 x 2.7 cm fusiform infrarenal   abdominal aortic aneurysm, unchanged from the comparison study.       Bones/Soft Tissues: No acute bony abnormality.  The bones appear   demineralized.           Impression   1. Motion limited evaluation with no evidence of acute pulmonary embolus to   the segmental level. 2. Patchy bilateral airspace opacities, more prominent on the left.  Imaging   features can be seen with COVID-19 pneumonia, though are nonspecific and can   occur with a variety of infectious and noninfectious processes. PneInd   3. No acute abdominopelvic findings. 4. Unchanged 3.1 cm fusiform infrarenal abdominal aortic aneurysm for which   follow-up is recommended in 3 years. 5. Asymmetric 3.8 cm soft tissue density in the left breast for which   nonemergent mammographic correlation is recommended.    6. Incidental findings including coronary artery disease, moderate to severe   atherosclerosis, diverticulosis and osteopenia.           Medical Decision Dofsog-Mizizsut-Wwbax:     Results for Sherry Michele (MRN [id number]) as of 5/6/2022 12:22   Ref. Range 5/5/2022 17:28 5/6/2022 07:01   Chlamydia pneumoniae By PCR Latest Ref Range: Not Detected   Not Detected   Rhino/Enterovirus PCR Latest Ref Range: Not Detected   Not Detected   Human Metapneumovirus PCR Latest Ref Range: Not Detected   Not Detected   Adenovirus PCR Latest Ref Range: Not Detected   Not Detected   B Pertussis by PCR Latest Ref Range: Not Detected   Not Detected   Coronavirus 229E PCR Latest Ref Range: Not Detected   Not Detected   Coronavirus HKU1 PCR Latest Ref Range: Not Detected   Not Detected   Coronavirus NL63 PCR Latest Ref Range: Not Detected   Not Detected   Coronavirus OC Latest Ref Range: Not Detected   Not Detected   Influenza A by PCR Latest Ref Range: Not Detected   Not Detected   Influenza B by PCR Latest Ref Range: Not Detected   Not Detected   Parainfluenza 1 PCR Latest Ref Range: Not Detected   Not Detected   Parainfluenza 2 PCR Latest Ref Range: Not Detected   Not Detected   Parainfluenza 3 PCR Latest Ref Range: Not Detected   Not Detected   Parainfluenza 4 PCR Latest Ref Range: Not Detected   Not Detected   Resp Syncytial Virus PCR Latest Ref Range: Not Detected   Not Detected   Mycoplasma pneumo by PCR Latest Ref Range: Not Detected   Not Detected   SARS-CoV-2, PCR Latest Ref Range: Not Detected   Not Detected   SARS-CoV-2, Rapid Latest Ref Range: Not Detected  Not Detected        Results for Sherry Michele (MRN [id number]) as of 5/6/2022 12:22   Ref.  Range 5/5/2022 16:45   Flu A Antigen Latest Ref Range: NEGATIVE  NEGATIVE   Flu B Antigen Latest Ref Range: NEGATIVE  NEGATIVE     Medical Decision Making-Other:     Note:  · Labs, medications, radiologic studies were reviewed with personal review of films  · Large amounts of data were reviewed  · Discussed with nursing Staff, Discharge planner  · Infection Control and Prevention measures reviewed  · All prior entries were reviewed  · Administer medications as ordered  · Prognosis: Fair  · Discharge planning reviewed      Thank you for allowing us to participate in the care of this patient. Please call with questions. Myron Moore, APRN - CNP     ATTESTATION:    I have discussed the case, including pertinent history and exam findings with the APRN. I have evaluated the  History, physical findings and pictures of the patient and the key elements of the encounter have been performed by me. I have reviewed the laboratory data, other diagnostic studies and discussed them with the APRN. I have updated the medical record where necessary. I agree with the assessment, plan and orders as documented by the APRN.     Jodie Turk MD.      Pager: [phone] - Office: [phone]

## 2022-05-09 NOTE — CARE COORDINATION
[facility] Quality Flow/Interdisciplinary Rounds Progress Note    Quality Flow Rounds held on May 9, 2022 at [address] Attending:  Bedside Nurse,  and Nursing Unit Leadership    Barriers to Discharge:     Anticipated Discharge Date:       Anticipated Discharge Disposition:    Readmission Risk              Risk of Unplanned Readmission:  17           Discussed patient goal for the day, patient clinical progression, and barriers to discharge. The following Goal(s) of the Day/Commitment(s) have been identified:  Activity Progression  Cardiac cath today, ambulating 300ft, Spoke with pts daughter and she is agreeable to have pt return home, notified her the patient has no skillable needs.  She will arrange to have people in the home and pt lives with her spouse      Savannah Fu RN  May 9, 2022

## 2022-05-09 NOTE — PROGRESS NOTES
Arrived to [facility], Alert. Consents signed and groins shaved. Alarms on. No family present at this time.

## 2022-05-09 NOTE — PROGRESS NOTES
[facility]  Progress Note                   Date:   5/9/2022  Patient name: Refugio Oliveira  Date of admission:  5/5/2022  4:24 PM  MRN:   [id number]  YOB: 1950  PCP: Gisela Melvin MD    Reason for Admission: Pneumonia [J18.9]  Septicemia ([address] [A41.9]  Pneumonia of both lungs due to infectious organism, unspecified part of lung [J18.9]    Subjective:       Clinical Changes /Abnormalities: Seen & examined in room resting with out distress on and on RA . Complain of chronic  chest pressures , states for years history of breast cancer and chronic back pain . Denies any chest pain or SOB presently. Labs, vitals, & tele reviewed. Npo for cath today     Review of Systems    Medications:   Scheduled Meds:   QUEtiapine  25 mg Oral Nightly    methylPREDNISolone  40 mg IntraVENous Q12H    diclofenac sodium  2 g Topical BID    furosemide  20 mg IntraVENous Daily    ipratropium-albuterol  1 ampule Inhalation Q4H WA    aspirin  81 mg Oral Daily    budesonide-formoterol  2 puff Inhalation BID    atorvastatin  40 mg Oral Daily    busPIRone  7.5 mg Oral BID    sodium chloride flush  5-40 mL IntraVENous 2 times per day     Continuous Infusions:   sodium chloride      heparin (PORCINE) Infusion 22 Units/kg/hr (05/09/22 0225)     CBC:   Recent Labs     05/07/22  0333 05/09/22  0114   WBC 8.3 8.7   HGB 12.3 12.3    268     BMP:    Recent Labs     05/07/22  0333 05/08/22  0249 05/09/22  0114    140 140   K 3.4* 3.9 3.7    106 105   CO2 22 24 25   BUN 14 11 14   CREATININE 0.57 0.41* 0.48*   GLUCOSE 186* 144* 146*     Hepatic:  No results for input(s): AST, ALT, ALB, BILITOT, ALKPHOS in the last 72 hours. Troponin:   No results for input(s): TROPHS in the last 72 hours. BNP: No results for input(s): BNP in the last 72 hours. Lipids: No results for input(s): CHOL, HDL in the last 72 hours.     Invalid input(s): LDLCALCU  INR:   No results for input(s): INR in the last 72 hours.    Objective:   Vitals: /62   Pulse 58   Temp 97.3 °F (36.3 °C) (Oral)   Resp 21   Ht 5' 3\" (1.6 m)   Wt 163 lb (73.9 kg)   SpO2 (!) 89%   BMI 28.87 kg/m²   General appearance: alert and cooperative with exam  HEENT: Head: Normocephalic, no lesions, without obvious abnormality. Neck:no JVD, trachea midline, no adenopathy  Lungs: Clear to auscultation b/l upper lobes, course b/l LL. On NC without distress  Heart: Regular rate and rhythm, s1/s2 auscultated, no murmurs, SR  Abdomen: soft, non-tender, bowel sounds active  Extremities: no edema  Neurologic: not done        Assessment / Acute Cardiac Problems:   1. NSTEMI, type I vs type II  2. Pneumonia  3. Hypokalemia  4. Coronary calcifications on CT  5. Hx of CAD per patient - unknown details  6. Hx of breast CA    Patient Active Problem List:     Cellulitis and abscess of face     Acute exacerbation of chronic obstructive pulmonary disease (COPD) ([address]     Coronary artery disease involving native coronary artery of native heart without angina pectoris     Benign essential HTN     Chronic bilateral low back pain without sciatica     Generalized anxiety disorder     Acute respiratory failure (HCC)     Carcinoma of upper-outer quadrant of left breast in female, estrogen receptor positive ([address]     Pneumonia     Sepsis ([address]     Megaloblastic anemia     Hypoalbuminemia     Puerperal sepsis with acute hypoxic respiratory failure (HCC)     Macrocytosis without anemia     Acute on chronic congestive heart failure with left ventricular diastolic dysfunction (HCC)     Lactic acidemia      Plan of Treatment:   1. Presently stable. Denies any chest pain. Continue IV Heparin gtt. 2. Plan for cath today per Dr. Daniel Burgos recommendation  . I have discussed risks (including but not limited to vascular injury, infection, hematoma, contrast induced kidney dysfunction, CVA and MI), benefits, alternatives in detail. All questions answered. Patient agrees to proceed. 3. Echo pending  4. Resp status improving significantly. Continue PNA tx per primary.     Electronically signed by ASHLEE Hayes NP on 5/9/2022 at 11:53 [address] [facility].  [phone]

## 2022-05-09 NOTE — OP NOTE
[facility]    CARDIAC CATHETERIZATION    Date:   5/9/2022  Patient name:  Dionna Park  Date of admission:  5/5/2022  4:24 PM  MRN:   [id number]  YOB: 1950    Operators:  Primary:   NORMAN Charles MD (Attending Physician)    Assistant/CV fellow:  Kelley Marroquin MD      Procedure performed:     [x] Left Heart Catheterization. [] Graft Angiography. [x] Left Ventriculography. [] Right Heart Catheterization. [x] Coronary Angiography. [] Aortic Valve Studies. [] PCI:      [] Other:       Pre Procedure Conscious Sedation Data:  ASA Class:    [] I [x] II [] III [] IV    Mallampati Class:  [] I [x] II [] III [] IV      Indication:  [] STEMI      [] + Stress test  [x] ACS      [] + EKG Changes  [] Non Q MI       [x] Significant Risk Factors  [] Recurrent Angina             [] Diabetes Mellitus    [] New LBBB      [] Uncontrolled HTN. [] CHF / Low EF changes     [] Abnormal CTA / Ca Score      Procedure:  Access:  [] Femoral  [x] Radial  artery       [x] Right  [] Left    Procedure: After informed consent was obtained with explanation of the risks and benefits, patient was brought to the cath lab. The access area was prepped and draped in sterile fashion. 1% lidocaine was used for local block. The artery was cannulated with 6  Fr sheath with brisk arterial blood return. The side port was frequently flushed and aspirated with normal saline. Findings:    LMCA: Normal 0% stenosis. LAD: has mid 30% stenosis. The D1 has ostial 30% stenosis. LCx: has mid 95% stenosis. The OM1 is small with ostial 30% stenosis. The   OM2 has mid 30% stenosis.       Lesion on Mid CX: 95% stenosis 18 mm length reduced to 0%. Pre procedure     CAMRON III flow was noted. Post Procedure CAMRON III flow was present. Good     runoff was present. The lesion was diagnosed as Moderate Risk (B). The     lesion was discrete and eccentric. The lesion showed with irregular     contour, mild angulation and mild tortuosity.         Comments:additional stent was placed at the proximal edge due to edge     dissection.     Devices used         - [employer] [place] Flex 180 cm. Number of passes: 1.         - Euphora Balloon 3.0mm x 15mm. 1 inflation(s) to a max pressure of: 8     lc.         - Resolute [place] 3.5 x 18 HEIKE. 1 inflation(s) to a max pressure of: 12     lc.         - Resolute [place] 3.5 x 8 HEIKE. 1 inflation(s) to a max pressure of: 12     lc.       RCA: has mid 100% chronic occlusion with right to right and left to right   collaterals.      Coronary Tree        Dominance: Right     The LV gram was performed in the LEAL 30 position. LVEF: 55%. Estimated Blood Loss:            [x] Minimal 10-25 cc   [] Minimal < 25 cc      [] Moderate 25-50 cc         [] >50 cc     Conclusions        Procedure Summary        Two vessel coronary artery disease.    Significant disease of the mid LCX.  of mid RCA.    Normal LV systolic function.    Successful PTCA/HEIKE of mid LCX.        Recommendations        Medical therapy as needed.    Risk factor modification.    Routine Post Diagnostic Cath orders.    Routine Post Stent Orders. Electronically signed on 5/9/2022 at 4:23 PM by:    Mounika Alonzo MD  Fellow, [address] [place] [address]    I was present during entire procedure and performed all critical elements of the procedure.     Denice Bustos MD

## 2022-05-10 VITALS
BODY MASS INDEX: 28.88 KG/M2 | SYSTOLIC BLOOD PRESSURE: 121 MMHG | HEART RATE: 65 BPM | DIASTOLIC BLOOD PRESSURE: 68 MMHG | TEMPERATURE: 98.5 F | RESPIRATION RATE: 15 BRPM | HEIGHT: 63 IN | WEIGHT: 163 LBS | OXYGEN SATURATION: 92 %

## 2022-05-10 LAB
ANION GAP SERPL CALCULATED.3IONS-SCNC: 9 MMOL/L (ref 9–17)
BUN BLDV-MCNC: 16 MG/DL (ref 8–23)
CALCIUM SERPL-MCNC: 9 MG/DL (ref 8.6–10.4)
CHLORIDE BLD-SCNC: 101 MMOL/L (ref 98–107)
CO2: 27 MMOL/L (ref 20–31)
CREAT SERPL-MCNC: 0.53 MG/DL (ref 0.5–0.9)
CULTURE: NORMAL
CULTURE: NORMAL
EKG ATRIAL RATE: 67 BPM
EKG P AXIS: 50 DEGREES
EKG P-R INTERVAL: 166 MS
EKG Q-T INTERVAL: 426 MS
EKG QRS DURATION: 86 MS
EKG QTC CALCULATION (BAZETT): 450 MS
EKG R AXIS: 77 DEGREES
EKG T AXIS: 56 DEGREES
EKG VENTRICULAR RATE: 67 BPM
GFR AFRICAN AMERICAN: >60 ML/MIN
GFR NON-AFRICAN AMERICAN: >60 ML/MIN
GFR SERPL CREATININE-BSD FRML MDRD: ABNORMAL ML/MIN/{1.73_M2}
GLUCOSE BLD-MCNC: 115 MG/DL (ref 70–99)
HCT VFR BLD CALC: 36.7 % (ref 36.3–47.1)
HEMOGLOBIN: 12.4 G/DL (ref 11.9–15.1)
Lab: NORMAL
Lab: NORMAL
MCH RBC QN AUTO: 34.3 PG (ref 25.2–33.5)
MCHC RBC AUTO-ENTMCNC: 33.8 G/DL (ref 28.4–34.8)
MCV RBC AUTO: 101.4 FL (ref 82.6–102.9)
NRBC AUTOMATED: 0 PER 100 WBC
PDW BLD-RTO: 14.6 % (ref 11.8–14.4)
PLATELET # BLD: 289 K/UL (ref 138–453)
PMV BLD AUTO: 9.9 FL (ref 8.1–13.5)
POTASSIUM SERPL-SCNC: 3.7 MMOL/L (ref 3.7–5.3)
RBC # BLD: 3.62 M/UL (ref 3.95–5.11)
SODIUM BLD-SCNC: 137 MMOL/L (ref 135–144)
SPECIMEN DESCRIPTION: NORMAL
SPECIMEN DESCRIPTION: NORMAL
WBC # BLD: 12.6 K/UL (ref 3.5–11.3)

## 2022-05-10 PROCEDURE — 6360000002 HC RX W HCPCS: Performed by: STUDENT IN AN ORGANIZED HEALTH CARE EDUCATION/TRAINING PROGRAM

## 2022-05-10 PROCEDURE — 97530 THERAPEUTIC ACTIVITIES: CPT

## 2022-05-10 PROCEDURE — 85027 COMPLETE CBC AUTOMATED: CPT

## 2022-05-10 PROCEDURE — 6370000000 HC RX 637 (ALT 250 FOR IP): Performed by: INTERNAL MEDICINE

## 2022-05-10 PROCEDURE — 36415 COLL VENOUS BLD VENIPUNCTURE: CPT

## 2022-05-10 PROCEDURE — 6370000000 HC RX 637 (ALT 250 FOR IP): Performed by: STUDENT IN AN ORGANIZED HEALTH CARE EDUCATION/TRAINING PROGRAM

## 2022-05-10 PROCEDURE — 2700000000 HC OXYGEN THERAPY PER DAY

## 2022-05-10 PROCEDURE — 94640 AIRWAY INHALATION TREATMENT: CPT

## 2022-05-10 PROCEDURE — 80048 BASIC METABOLIC PNL TOTAL CA: CPT

## 2022-05-10 PROCEDURE — 2580000003 HC RX 258: Performed by: STUDENT IN AN ORGANIZED HEALTH CARE EDUCATION/TRAINING PROGRAM

## 2022-05-10 PROCEDURE — 99232 SBSQ HOSP IP/OBS MODERATE 35: CPT | Performed by: INTERNAL MEDICINE

## 2022-05-10 PROCEDURE — APPSS30 APP SPLIT SHARED TIME 16-30 MINUTES: Performed by: NURSE PRACTITIONER

## 2022-05-10 PROCEDURE — 99239 HOSP IP/OBS DSCHRG MGMT >30: CPT | Performed by: INTERNAL MEDICINE

## 2022-05-10 PROCEDURE — 94761 N-INVAS EAR/PLS OXIMETRY MLT: CPT

## 2022-05-10 PROCEDURE — 6370000000 HC RX 637 (ALT 250 FOR IP): Performed by: SURGERY

## 2022-05-10 PROCEDURE — 97116 GAIT TRAINING THERAPY: CPT

## 2022-05-10 PROCEDURE — 97110 THERAPEUTIC EXERCISES: CPT

## 2022-05-10 RX ORDER — ASPIRIN 81 MG/1
81 TABLET, CHEWABLE ORAL DAILY
Qty: 30 TABLET | Refills: 3 | Status: SHIPPED | OUTPATIENT
Start: 2022-05-11

## 2022-05-10 RX ORDER — PREDNISONE 20 MG/1
40 TABLET ORAL DAILY
Qty: 2 TABLET | Refills: 0 | Status: SHIPPED | OUTPATIENT
Start: 2022-05-11 | End: 2022-05-12

## 2022-05-10 RX ORDER — POTASSIUM CHLORIDE 20 MEQ/1
40 TABLET, EXTENDED RELEASE ORAL ONCE
Status: COMPLETED | OUTPATIENT
Start: 2022-05-10 | End: 2022-05-10

## 2022-05-10 RX ORDER — METOPROLOL SUCCINATE 25 MG/1
25 TABLET, EXTENDED RELEASE ORAL DAILY
Status: DISCONTINUED | OUTPATIENT
Start: 2022-05-10 | End: 2022-05-10 | Stop reason: HOSPADM

## 2022-05-10 RX ORDER — ISOSORBIDE MONONITRATE 30 MG/1
30 TABLET, EXTENDED RELEASE ORAL DAILY
Status: DISCONTINUED | OUTPATIENT
Start: 2022-05-10 | End: 2022-05-10 | Stop reason: HOSPADM

## 2022-05-10 RX ADMIN — DICLOFENAC 2 G: 10 GEL TOPICAL at 03:43

## 2022-05-10 RX ADMIN — BUDESONIDE AND FORMOTEROL FUMARATE DIHYDRATE 2 PUFF: 160; 4.5 AEROSOL RESPIRATORY (INHALATION) at 08:35

## 2022-05-10 RX ADMIN — FUROSEMIDE 20 MG: 10 INJECTION, SOLUTION INTRAMUSCULAR; INTRAVENOUS at 08:16

## 2022-05-10 RX ADMIN — PREDNISONE 40 MG: 20 TABLET ORAL at 08:16

## 2022-05-10 RX ADMIN — DICLOFENAC 2 G: 10 GEL TOPICAL at 08:16

## 2022-05-10 RX ADMIN — SODIUM CHLORIDE, PRESERVATIVE FREE 10 ML: 5 INJECTION INTRAVENOUS at 03:44

## 2022-05-10 RX ADMIN — ASPIRIN 81 MG: 81 TABLET, CHEWABLE ORAL at 08:16

## 2022-05-10 RX ADMIN — DESMOPRESSIN ACETATE 40 MG: 0.2 TABLET ORAL at 08:16

## 2022-05-10 RX ADMIN — TICAGRELOR 90 MG: 90 TABLET ORAL at 03:44

## 2022-05-10 RX ADMIN — POTASSIUM CHLORIDE 40 MEQ: 1500 TABLET, EXTENDED RELEASE ORAL at 08:16

## 2022-05-10 RX ADMIN — ISOSORBIDE MONONITRATE 30 MG: 30 TABLET ORAL at 10:44

## 2022-05-10 RX ADMIN — METOPROLOL SUCCINATE 25 MG: 25 TABLET, FILM COATED, EXTENDED RELEASE ORAL at 10:44

## 2022-05-10 RX ADMIN — TICAGRELOR 90 MG: 90 TABLET ORAL at 08:16

## 2022-05-10 RX ADMIN — IPRATROPIUM BROMIDE AND ALBUTEROL SULFATE 1 AMPULE: .5; 3 SOLUTION RESPIRATORY (INHALATION) at 11:56

## 2022-05-10 RX ADMIN — SODIUM CHLORIDE, PRESERVATIVE FREE 5 ML: 5 INJECTION INTRAVENOUS at 08:19

## 2022-05-10 RX ADMIN — BUSPIRONE HYDROCHLORIDE 7.5 MG: 5 TABLET ORAL at 08:16

## 2022-05-10 RX ADMIN — IPRATROPIUM BROMIDE AND ALBUTEROL SULFATE 1 AMPULE: .5; 3 SOLUTION RESPIRATORY (INHALATION) at 08:35

## 2022-05-10 ASSESSMENT — PAIN SCALES - GENERAL: PAINLEVEL_OUTOF10: 0

## 2022-05-10 NOTE — PROGRESS NOTES
Comprehensive Nutrition Assessment    Type and Reason for Visit:  Reassess    Nutrition Recommendations/Plan:   1. Continue diet, Regular - Cardiac Restriction  2. Monitor PO intake/plan of care     Malnutrition Assessment:  Malnutrition Status:  Insufficient data (05/06/22 1320)    Context:  Chronic Illness     Findings of the 6 clinical characteristics of malnutrition:  Energy Intake:  Unable to assess  Weight Loss:  Unable to assess     Body Fat Loss:  Unable to assess     Muscle Mass Loss:  Unable to assess    Fluid Accumulation:  No significant fluid accumulation     Strength:  Not Performed    Nutrition Assessment:    Pt reports an okay appetite, always having a good appetite; breakfast ate strawberries and a banana, didn't eat [place] toast because it was cold; drank milk, coffee, and pepsi. Pt was NPO on 5/9 d/t cardiac catheterization. Pt denies n/v, chewing/swallowing difficulty (missing teeth). Nutrition Related Findings:    Labs/meds reviewed. No edema noted. LBM 5/8 (loose) per chart. Wound Type: None       Current Nutrition Intake & Therapies:    Average Meal Intake: 1-25% (breakfast 5/10)  Average Supplements Intake: None Ordered  ADULT DIET; Regular; Low Fat/Low Chol/High Fiber/2 gm Na    Anthropometric Measures:  Height: 5' 3\" (160 cm)  Ideal Body Weight (IBW): 115 lbs (52 kg)    Admission Body Weight: 163 lb (73.9 kg)  Current Body Weight: 163 lb (73.9 kg),   IBW. Weight Source: Stated  Current BMI (kg/m2): 28.9        Weight Adjustment For: No Adjustment                 BMI Categories: Overweight (BMI 25.0-29. 9)    Estimated Daily Nutrient Needs:  Energy Requirements Based On: Kcal/kg  Weight Used for Energy Requirements: Current  Energy (kcal/day): 8851-9883 kcals/day (20-23 kcal/kg)  Weight Used for Protein Requirements: Ideal  Protein (g/day): 60-70 g/day (1.2-1.4 g/kg)  Method Used for Fluid Requirements: ml/Kg  Fluid (ml/day): 1800 mL/day or per MD    Nutrition Diagnosis:   · Inadequate oral intake related to impaired cardiac function as evidenced by (cardiac catheterization 5/9)      Nutrition Interventions:   Food and/or Nutrient Delivery: Continue Current Diet  Nutrition Education/Counseling: No recommendation at this time  Coordination of Nutrition Care: Continue to monitor while inpatient       Goals:  Previous Goal Met: Goal(s) Achieved  Goals: PO intake 50% or greater,by next RD assessment       Nutrition Monitoring and Evaluation:   Behavioral-Environmental Outcomes: None Identified  Food/Nutrient Intake Outcomes: Food and Nutrient Intake  Physical Signs/Symptoms Outcomes: Biochemical Data,Weight,Nutrition Focused Physical Findings    Discharge Planning:    No discharge needs at this time     Bridget Woodard, [address]: [phone]

## 2022-05-10 NOTE — PROGRESS NOTES
Physical Therapy  Facility/Department: [address] STEPDOWN  Physical Therapy Daily Treatment Note    Name: Lexie Sloan  : 1950  MRN: [id number]  Date of Service: 5/10/2022    Discharge Recommendations:  Patient would benefit from continued therapy after discharge   PT Equipment Recommendations  Equipment Needed: No  Other: Pt reports owning a rollator, SPC, writer recommends  use of rollator. Patient Diagnosis(es): The primary encounter diagnosis was Pneumonia of both lungs due to infectious organism, unspecified part of lung. A diagnosis of Septicemia ([address] was also pertinent to this visit. Past Medical History:  has a past medical history of Acid reflux, Anxiety and depression, Asthma, CAD (coronary artery disease), CHF (congestive heart failure) ([address] COPD (chronic obstructive pulmonary disease) ([address] Hyperlipidemia, Hypertension, IBS (irritable bowel syndrome), MI (myocardial infarction) ([address] On home O2, Osteoarthritis, Peptic ulcer, and PNA (pneumonia). Past Surgical History:  has a past surgical history that includes cervical fusion; Tonsillectomy; Cardiac catheterization (2017); Upper gastrointestinal endoscopy (N/A, 2020); Colonoscopy (N/A, 2020); and Upper gastrointestinal endoscopy (N/A, 2021). Assessment   Body Structures, Functions, Activity Limitations Requiring Skilled Therapeutic Intervention: Decreased functional mobility ; Decreased strength;Decreased endurance;Decreased balance; Increased pain  Assessment: Pt ambulated 30ft and an additional 300ft with RW and CGA throughout for safety. Pt overall steady with mobility with no LOB noted. Pt would benefit from continued PT to address further balance and endurance deficits and improve overall functional independence.   Therapy Prognosis: Good  Requires PT Follow-Up: Yes  Activity Tolerance  Activity Tolerance: Patient tolerated treatment well     Plan   Plan  Plan: 6-7 times per week  Specific Instructions for Next Treatment: Continue progressing toward goals. Current Treatment Recommendations: Strengthening,Balance training,Functional mobility training,Endurance training,Neuromuscular re-education,Transfer training,Gait training,Home exercise program,Safety education & training  Safety Devices  Type of Devices: All fall risk precautions in place,Call light within reach,Patient at risk for falls,Gait belt,Nurse notified (Pt left sitting EOB.)  Restraints  Restraints Initially in Place: No     Restrictions  Restrictions/Precautions  Restrictions/Precautions: Up as Tolerated  Required Braces or Orthoses?: No  Position Activity Restriction  Other position/activity restrictions: 3L O2 via NC. Subjective   General  Chart Reviewed: Yes  Patient assessed for rehabilitation services?: Yes  Response To Previous Treatment: Patient with no complaints from previous session. Family / Caregiver Present: No  Follows Commands: Within Functional Limits  General Comment  Comments: Pt returned to seated EOB at end of session with call light in reach. Subjective  Subjective: Pt and RN agreeable to PT this morning. Pt seated EOB upon arrival with no c/o pain. Pt pleasant and cooperative throughout session. Cognition   Orientation  Overall Orientation Status: Within Functional Limits  Cognition  Overall Cognitive Status: WFL     Objective   Bed mobility  Supine to Sit: Unable to assess  Sit to Supine: Unable to assess  Scooting: Stand by assistance  Bed Mobility Comments: Pt sitting EOB upon arrival and retired to sitting EOB at end of session. Transfers  Sit to Stand: Contact guard assistance  Stand to sit: Contact guard assistance  Comment: RW used for STS transfers, Pt performed multiple STS transfers throughout session from varying level surfaces.   Ambulation  Surface: level tile  Device: Rolling Walker  Other Apparatus: O2  Assistance: Contact guard assistance  Quality of Gait: fair stability, decreased stride length, no LOB.  Gait Deviations: Slow [place];Decreased step length  Distance: 20ft to bathroom, then 10ft back to bed. Comments: Pt ambulated to the bathroom, seated rest break on toilet, then ambulated back to nearest side of bed. More Ambulation?: Yes  Ambulation 2  Surface - 2: level tile  Device 2: Rolling Walker  Other Apparatus 2: O2  Gait Deviations: Slow [place];Decreased step length  Distance: 300ft  Comments: Pt ambulated in hallways overall steady with no LOB noted. Stairs/Curb  Stairs?: No     Balance  Posture: Fair  Sitting - Static: Good  Sitting - Dynamic: Good;-  Standing - Static: Fair;+  Standing - Dynamic: Fair  Comments: standing balance assessed while using a RW    Exercise Treatment:  Seated LE exercise program: Long Arc Quads, hip abduction/adduction, heel/toe raises, and marches. Reps: x10  Comments: Pt performed LE exercises while seated EOB. AM-PAC Score  AM-PAC Inpatient Mobility Raw Score : 19 (05/10/22 1331)  AM-PAC Inpatient T-Scale Score : 45.44 (05/10/22 1331)  Mobility Inpatient CMS 0-100% Score: 41.77 (05/10/22 1331)  Mobility Inpatient CMS G-Code Modifier : CK (05/10/22 1331)          Goals  Short Term Goals  Time Frame for Short term goals: 14 visits  Short term goal 1: Pt will ambulate 200 feet with least restrictive device and supervision to increase functional independence. Short term goal 2: Pt will demonstrate good- standing balance to decrease fall risk  Short term goal 3: Pt will tolerate a 35 minute therapy session to promote increased endurance. Short term goal 4: Pt will perform sit<>stand transfer independently to increase functional independence. Short term goal 5: Pt will negotiate 3 stairs with no handrail and SBA to allow the pt to enter prior living arrangements.        Therapy Time   Individual Concurrent Group Co-treatment   Time In 1004         Time Out 1043         Minutes 39         Timed Code Treatment Minutes: [address], [facility]

## 2022-05-10 NOTE — PROGRESS NOTES
[facility]  Today's Date and Time: 5/10/2022, 1:18 PM    Impression :   COPD on home 02 @ 2.5 L/min  Acute on chronic hypoxic respiratory failure   CHF  CAD  Elevated troponins with CAD  NSTEMI  Benign essential HTN  Carcinoma of upper-outer quadrant of left breast in female, estrogen receptor positive     Recommendations:   Monitor off antibiotics  Continue to Treat CHF    Medical Decision Making/Summary/Discussion:5/10/2022       Infection Control Recommendations   [place] Precautions      Antimicrobial Stewardship Recommendations     Discontinuation of therapy  Coordination of Outpatient Care:   Estimated Length of IV antimicrobials: 5-6-22  Patient will need Midline Catheter Insertion: no  Patient will need PICC line Insertion:No  Patient will need: Home IV , [place],  SNF,  LTAC: TBD  Patient will need outpatient wound care:No    Chief complaint/reason for consultation:   Concern for pneumonia      History of Present Illness:   Krystyna Haddad is a 80y.o.-year-old  female who was initially admitted on 5/5/2022. Patient seen at the request of Terry Ashraf. INITIAL HISTORY:    Patient presented through ER with complaints of altered mental status secondary to hypoxia. Family indicated that she has Hx of CAD, COPD, CHF, HTN, Irritable bowel syndrome, peptic ulcer disease. Patient is on home 02 @ 2.5 L/min. Family reported that she had signs of a respiratory illness for about 2 weeks prior to being found by her  with altered mental status. EMS documented she was hypoxic prior to transport to ER with 02 sats in the 80's. In the ER patient was afebrile, hypoxic. She required non re-breather-->BiPAP-->High Flow. CXR:  5-5-22: Diffuse bilateral interstitial opacities which may be related to pulmonary edema or atypical/viral infection    CT:  5-5-22: Infrarenal AAA. Diffuse interstitial opacities, more prominent on Lt. Asymmetric Lt breast density not appreciated on CT 4-23-21    Clinical data, Hx, X rays point towards CHF leading to fluid retention with interstitial edema. Laboratory tests point away from infectious pneumonia    CURRENT EVALUATION 5/10/2022    TMAX 99.4  VS stable  Borderline hypotension    The patient is on 5-->2.5 L NC today. She wears 2 to 3 L NC at home    Cardiac Cath findings and interventions 5/9/22:   Two vessel coronary artery disease. Significant disease of the mid LCX.  of mid RCA. Normal LV systolic function. Successful PTCA/HEIKE of mid LCX. She will follow up with her cardiologist in 2 weeks    She is alert and denies any acute issues    She is being discharged home today    Labs, X rays reviewed: 5/10/2022    BUN:19-->14-->16  Cr:0.64-->0.48-->0.53    WBC: 21.9-->16.5-->8.7-->12.6  Hb:12.3-->12.3-->12.4  Plat: 250-->268-->289    Troponin 453, 570  Pro BNP 6463    Cultures:  Urine:  5-5-22: No growth   Blood:  5-5-22: No growth   Sputum :    Wound:    Respiratory PCR: negative   Influenza A & B: negative      Discussed with patient, RN, family. 5-5-22: I have personally reviewed the past medical history, past surgical history, medications, social history, and family history, and I have updated the database accordingly. Past Medical History:     Past Medical History:   Diagnosis Date    Acid reflux     Anxiety and depression     Asthma     CAD (coronary artery disease)     per medical record. Pt follows-up with Dr. Eldon Canseco CHF (congestive heart failure) ([facility]     per medical record    COPD (chronic obstructive pulmonary disease) ([facility]     Hyperlipidemia     Hypertension     IBS (irritable bowel syndrome)     MI (myocardial infarction) ([facility]     On home O2     at night per pt.  2.5 Liters    Osteoarthritis     Peptic ulcer     PNA (pneumonia) 2019       Past Surgical  History:     Past Surgical History:   Procedure Laterality Date    CARDIAC CATHETERIZATION 2017    per medical record. No stents placed per pt.     CERVICAL FUSION      COLONOSCOPY N/A 7/31/2020    [facility]ONOSCOPY H[facility] N/A 7/31/2020    EGD BIOPSY WITH SAVORY DILATION [facility] 6/[facility]/2021    EGD BIOPSY performed by Mike Estrella MD at [facility]       Medications:      metoprolol succinate  25 mg Oral Daily    isosorbide mononitrate  30 mg Oral Daily    predniSONE  40 mg Oral Daily    ticagrelor  90 mg Oral BID    sodium chloride flush  5-40 mL IntraVENous 2 times per day    QUEtiapine  25 mg Oral Nightly    diclofenac sodium  2 g Topical BID    furosemide  20 mg IntraVENous Daily    ipratropium-albuterol  1 ampule Inhalation Q4H WA    aspirin  81 mg Oral Daily    budesonide-formoterol  2 puff Inhalation BID    atorvastatin  40 mg Oral Daily    busPIRone  7.5 mg Oral BID       Social History:     Social History     Socioeconomic History    Marital status: Single     Spouse name: Not on file    Number of children: Not on file    Years of education: Not on file    Highest education level: Not on file   Occupational History    Not on file   Tobacco Use    Smoking status: Current Every Day Smoker     Packs/day: 0.25     Years: 40.00     Pack years: 10.00     Types: Cigarettes    Smokeless tobacco: Never Used    Tobacco comment: pt on chantix   Vaping Use    Vaping Use: Never used   Substance and Sexual Activity    Alcohol use: No     Alcohol/week: 0.0 standard drinks    Drug use: No    Sexual activity: Not on file   Other Topics Concern    Not on file   Social History Narrative    Not on file     Social Determinants of Health     Financial Resource Strain:     Difficulty of Paying Living Expenses: Not on file   Food Insecurity:     Worried About Running Out of Food in the Last Year: Not on file    Juan of Food in the Last Year: Not on file   Transportation Needs:     Lack of Transportation (Medical): Not on file    Lack of Transportation (Non-Medical): Not on file   Physical Activity:     Days of Exercise per Week: Not on file    Minutes of Exercise per Session: Not on file   Stress:     Feeling of Stress : Not on file   Social Connections:     Frequency of Communication with Friends and Family: Not on file    Frequency of Social Gatherings with Friends and Family: Not on file    Attends Jain Services: Not on file    Active Member of [address] [place] [address] [employer] or Organizations: Not on file    Attends Club or Organization Meetings: Not on file    Marital Status: Not on file   Intimate Partner Violence:     Fear of Current or Ex-Partner: Not on file    Emotionally Abused: Not on file    Physically Abused: Not on file    Sexually Abused: Not on file   Housing Stability:     Unable to Pay for Housing in the Last Year: Not on file    Number of [place] in the Last Year: Not on file    Unstable Housing in the Last Year: Not on file       Family History:   No family history on file. Allergies:   Sulfa antibiotics     Review of Systems:   Constitutional: No fevers or chills. SOB  Head: No headaches  Eyes: No double vision or blurry vision. No conjunctival inflammation. ENT: No sore throat or runny nose. . No hearing loss, tinnitus or vertigo. Cardiovascular: No chest pain or palpitations. Shortness of breath. MORTON  Lung: Shortness of breath, dry cough. No sputum production  Abdomen: No nausea, vomiting, diarrhea, or abdominal pain. Clenton Reas No cramps. Genitourinary: No increased urinary frequency, or dysuria. No hematuria. No suprapubic or CVA pain  Musculoskeletal: No muscle aches or pains. No joint effusions, swelling or deformities  Hematologic: No bleeding or bruising. Neurologic: No headache, weakness, numbness, or tingling. Integument: No rash, no ulcers. Psychiatric: No depression.    Endocrine: No polyuria, no polydipsia, no polyphagia. Physical Examination :     Patient Vitals for the past 8 hrs:   BP Temp Temp src Pulse Resp SpO2   05/10/22 1158     19 91 %   05/10/22 1120 (!) 98/39 99.4 °F (37.4 °C) Oral 70 19 92 %   05/10/22 0842     23 99 %   05/10/22 0840     16 98 %   05/10/22 0630 (!) 117/58 99 °F (37.2 °C) Temporal 62 16 95 %     General Appearance: Awake, alert, and in no apparent distress  Head:  Normocephalic, no trauma  Eyes: Pupils equal, round, reactive to light and accommodation; extraocular movements intact; sclera anicteric; conjunctivae pink. No embolic phenomena. ENT: Oropharynx clear, without erythema, exudate, or thrush. No tenderness of sinuses. Mouth/throat: mucosa pink and moist. No lesions. Dentures  Neck:Supple, without lymphadenopathy. Thyroid normal, No bruits. Pulmonary/Chest:  Fine crackles  Cardiovascular: Regular rate and rhythm without murmurs, rubs, or gallops. Abdomen: Soft, non tender. Bowel sounds normal. No organomegaly  All four Extremities: No cyanosis, clubbing, edema, or effusions. Neurologic: Disoriented  Skin: Warm and dry with good turgor. No signs of peripheral arterial or venous insufficiency. No ulcerations. No open wounds. Medical Decision Making -Laboratory:   I have independently reviewed/ordered the following labs:    CBC with Differential:   Recent Labs     05/09/22  0114 05/10/22  0552   WBC 8.7 12.6*   HGB 12.3 12.4   HCT 35.7* 36.7    289     BMP:   Recent Labs     05/09/22  0114 05/10/22  0552    137   K 3.7 3.7    101   CO2 25 27   BUN 14 16   CREATININE 0.48* 0.53     Hepatic Function Panel:   No results for input(s): PROT, LABALBU, BILIDIR, IBILI, BILITOT, ALKPHOS, ALT, AST in the last 72 hours. No results for input(s): RPR in the last 72 hours. No results for input(s): HIV in the last 72 hours. No results for input(s): BC in the last 72 hours.   Lab Results   Component Value Date    MUCUS 2+ 05/05/2022    RBC 3.62 05/10/2022    WBC 12.6 05/10/2022    TURBIDITY Clear 05/05/2022     Lab Results   Component Value Date    CREATININE 0.53 05/10/2022    GLUCOSE 115 05/10/2022       Medical Decision Making-Imaging:     EXAMINATION:   CTA OF THE CHEST; CT OF THE ABDOMEN AND PELVIS WITH CONTRAST 5/5/2022 5:20 pm       TECHNIQUE:   CTA of the chest was performed after the administration of intravenous   contrast.  Multiplanar reformatted images are provided for review.  MIP   images are provided for review. Dose modulation, iterative reconstruction,   and/or weight based adjustment of the mA/kV was utilized to reduce the   radiation dose to as low as reasonably achievable.; CT of the abdomen and   pelvis was performed with the administration of intravenous contrast.   Multiplanar reformatted images are provided for review. Dose modulation,   iterative reconstruction, and/or weight based adjustment of the mA/kV was   utilized to reduce the radiation dose to as low as reasonably achievable.       COMPARISON:   CT of the abdomen and pelvis, 04/23/2021       HISTORY:   ORDERING SYSTEM PROVIDED HISTORY: rule out PE   TECHNOLOGIST PROVIDED HISTORY:   rule out PE   Decision Support Exception - unselect if not a suspected or confirmed   emergency medical condition->Emergency Medical Condition (MA); ORDERING   SYSTEM PROVIDED HISTORY: PAIN   TECHNOLOGIST PROVIDED HISTORY:   PAIN       Decision Support Exception - unselect if not a suspected or confirmed   emergency medical condition->Emergency Medical Condition (MA)       FINDINGS:       Chest:       Pulmonary arteries:  The pulmonary arteries are adequately opacified for   evaluation.  Evaluation is limited by respiratory motion.  There is no   evidence of acute pulmonary embolus to the segmental level.  The main   pulmonary artery is normal in caliber.       Mediastinum: There is no mediastinal adenopathy.  The heart and pericardium   are without acute abnormality.  Coronary artery calcifications are present. The thoracic aorta is normal in caliber.  Mild atherosclerotic plaque.       Lungs/pleura: Respiratory motion slightly limits evaluation.  There are   patchy bilateral ground-glass opacities, slightly more prominent in the left   upper lobe.  No effusion or pneumothorax.  Limited evaluation for small   pulmonary nodules.       Soft Tissues/Bones: Asymmetric soft tissue density in the left breast which   measures approximately 3.8 x 2.7 cm.  No axillary or supraclavicular   lymphadenopathy.  No acute bony abnormality.  Partially seen cervical fusion   hardware.           Abdomen/Pelvis:       Organs: Mildly motion limited evaluation of the upper abdomen.  The liver,   spleen, pancreas, kidneys and adrenal glands are without acute findings.  The   gallbladder is present without radiopaque cholelithiasis.       GI/Bowel: No mechanical bowel obstruction.  Normal appendix.  A few scattered   diverticula are present.       Pelvis: Urinary bladder is nondistended and not well evaluated.  A Blanco   catheter is present.  Status post hysterectomy.  No adnexal mass.       Peritoneum/Retroperitoneum: No ascites or pneumoperitoneum.  No   retroperitoneal or mesenteric lymphadenopathy.  Moderate to severe   atherosclerotic plaque.  There is a 3.1 x 2.7 cm fusiform infrarenal   abdominal aortic aneurysm, unchanged from the comparison study.       Bones/Soft Tissues: No acute bony abnormality.  The bones appear   demineralized.           Impression   1. Motion limited evaluation with no evidence of acute pulmonary embolus to   the segmental level. 2. Patchy bilateral airspace opacities, more prominent on the left.  Imaging   features can be seen with COVID-19 pneumonia, though are nonspecific and can   occur with a variety of infectious and noninfectious processes. PneInd   3. No acute abdominopelvic findings.    4. Unchanged 3.1 cm fusiform infrarenal abdominal aortic aneurysm for which   follow-up is recommended in 3 years. 5. Asymmetric 3.8 cm soft tissue density in the left breast for which   nonemergent mammographic correlation is recommended. 6. Incidental findings including coronary artery disease, moderate to severe   atherosclerosis, diverticulosis and osteopenia.           Medical Decision Zbpezy-[id number]-[id number]:     Results for Raven Kearney (MRN [id number]) as of 5/6/2022 12:22   Ref. Range 5/5/2022 17:28 5/6/2022 07:01   Chlamydia pneumoniae By PCR Latest Ref Range: Not Detected   Not Detected   Rhino/Enterovirus PCR Latest Ref Range: Not Detected   Not Detected   Human Metapneumovirus PCR Latest Ref Range: Not Detected   Not Detected   Adenovirus PCR Latest Ref Range: Not Detected   Not Detected   B Pertussis by PCR Latest Ref Range: Not Detected   Not Detected   Coronavirus 229E PCR Latest Ref Range: Not Detected   Not Detected   Coronavirus HKU1 PCR Latest Ref Range: Not Detected   Not Detected   Coronavirus NL63 PCR Latest Ref Range: Not Detected   Not Detected   Coronavirus OC Latest Ref Range: Not Detected   Not Detected   Influenza A by PCR Latest Ref Range: Not Detected   Not Detected   Influenza B by PCR Latest Ref Range: Not Detected   Not Detected   Parainfluenza 1 PCR Latest Ref Range: Not Detected   Not Detected   Parainfluenza 2 PCR Latest Ref Range: Not Detected   Not Detected   Parainfluenza 3 PCR Latest Ref Range: Not Detected   Not Detected   Parainfluenza 4 PCR Latest Ref Range: Not Detected   Not Detected   Resp Syncytial Virus PCR Latest Ref Range: Not Detected   Not Detected   Mycoplasma pneumo by PCR Latest Ref Range: Not Detected   Not Detected   SARS-CoV-2, PCR Latest Ref Range: Not Detected   Not Detected   SARS-CoV-2, Rapid Latest Ref Range: Not Detected  Not Detected        Results for Raven Kearney (MRN [id number]) as of 5/6/2022 12:22   Ref.  Range 5/5/2022 16:45   Flu A Antigen Latest Ref Range: NEGATIVE  NEGATIVE   Flu B Antigen Latest Ref Range: NEGATIVE  NEGATIVE     Medical Decision Making-Other:     Note:  Labs, medications, radiologic studies were reviewed with personal review of films  Large amounts of data were reviewed  Discussed with nursing Staff, Discharge planner  Infection Control and Prevention measures reviewed  All prior entries were reviewed  Administer medications as ordered  Prognosis: [address]  Discharge planning reviewed      Thank you for allowing us to participate in the care of this patient. Please call with questions. Myron Moore, APRN - CNP     ATTESTATION:    I have discussed the case, including pertinent history and exam findings with the APRN. I have evaluated the  History, physical findings and pictures of the patient and the key elements of the encounter have been performed by me. I have reviewed the laboratory data, other diagnostic studies and discussed them with the APRN. I have updated the medical record where necessary. I agree with the assessment, plan and orders as documented by the APRN.     Jodie Turk MD.      Pager: [phone] - Office: [phone]

## 2022-05-10 NOTE — PROGRESS NOTES
[facility]  Progress Note                   Date:   5/10/2022  Patient name: Bonnie Fregoso  Date of admission:  5/5/2022  4:24 PM  MRN:   [id number]  YOB: 1950  PCP: Teresa Todd MD    Reason for Admission: Pneumonia [J18.9]  Septicemia ([address] [A41.9]  Pneumonia of both lungs due to infectious organism, unspecified part of lung [J18.9]    Subjective:       Clinical Changes /Abnormalities: Seen & examined in room resting with out distress on NC . Denies any chest pain or SOB presently. Labs, vitals, & tele reviewed. Review of Systems   HENT: Positive for dental problem. Medications:   Scheduled Meds:   predniSONE  40 mg Oral Daily    ticagrelor  90 mg Oral BID    sodium chloride flush  5-40 mL IntraVENous 2 times per day    QUEtiapine  25 mg Oral Nightly    diclofenac sodium  2 g Topical BID    furosemide  20 mg IntraVENous Daily    ipratropium-albuterol  1 ampule Inhalation Q4H WA    aspirin  81 mg Oral Daily    budesonide-formoterol  2 puff Inhalation BID    atorvastatin  40 mg Oral Daily    busPIRone  7.5 mg Oral BID     Continuous Infusions:   sodium chloride       CBC:   Recent Labs     05/09/22  0114 05/10/22  0552   WBC 8.7 12.6*   HGB 12.3 12.4    289     BMP:    Recent Labs     05/08/22  0249 05/09/22  0114 05/10/22  0552    140 137   K 3.9 3.7 3.7    105 101   CO2 24 25 27   BUN 11 14 16   CREATININE 0.41* 0.48* 0.53   GLUCOSE 144* 146* 115*     Hepatic:  No results for input(s): AST, ALT, ALB, BILITOT, ALKPHOS in the last 72 hours. Troponin:   No results for input(s): TROPHS in the last 72 hours. BNP: No results for input(s): BNP in the last 72 hours. Lipids: No results for input(s): CHOL, HDL in the last 72 hours. Invalid input(s): LDLCALCU  INR:   No results for input(s): INR in the last 72 hours. Cardiac cath 5/10/2022  Findings:     LMCA: Normal 0% stenosis. LAD: has mid 30% stenosis.  The D1 has ostial 30% stenosis. LCx: has mid 95% stenosis. The OM1 is small with ostial 30% stenosis. The   OM2 has mid 30% stenosis.       Lesion on Mid CX: 95% stenosis 18 mm length reduced to 0%. Pre procedure     CAMRON III flow was noted. Post Procedure CAMRON III flow was present. Good     runoff was present. The lesion was diagnosed as Moderate Risk (B). The     lesion was discrete and eccentric. The lesion showed with irregular     contour, mild angulation and mild tortuosity.         Comments:additional stent was placed at the proximal edge due to edge     dissection.     Devices used         - Wami Flex 180 cm. Number of passes: 1.         - Euphora Balloon 3.0mm x 15mm. 1 inflation(s) to a max pressure of: 8     lc.         - Resolute [place] 3.5 x 18 HEIKE. 1 inflation(s) to a max pressure of: 12     lc.         - Resolute [place] 3.5 x 8 HEIKE. 1 inflation(s) to a max pressure of: 12     lc.       RCA: has mid 100% chronic occlusion with right to right and left to right   collaterals.      Coronary Tree        Dominance: Right      The LV gram was performed in the LEAL 30 position. LVEF: 55%.      Estimated Blood Loss:            [x]? ? Minimal 10-25 cc   []? ? Minimal < 25 cc      []? ? Moderate 25-50 cc         []?? >50 cc      Conclusions        Procedure Summary        Two vessel coronary artery disease.    Significant disease of the mid LCX.  of mid RCA.    Normal LV systolic function.    Successful PTCA/HEIKE of mid LCX.        Recommendations        Medical therapy as needed.    Risk factor modification.    Routine Post Diagnostic Cath orders.    Routine Post Stent Orders.         Electronically signed on 5/9/2022 at 4:23 PM by:  Tarsha Dumont MD  Fellow, [address]     ECHO 5/7/2022   CONCLUSIONS     Summary  Left ventricle is normal in size, global left ventricular systolic function  is normal, estimated ejection fraction is > 65%.   No significant valvular regurgitation or stenosis seen.     Signature  ----------------------------------------------------------------------------   Electronically signed by Layla Alarcon(Sonographer) on 05/07/2022 01:34 PM  ----------------------------------------------------------------------------     ----------------------------------------------------------------------------   Electronically signed by Casie Newton(Interpreting physician) on   05/08/2022 10:46 AM  ----------------------------------------------------------------------------      Objective:   Vitals: BP (!) 117/58   Pulse 62   Temp 99 °F (37.2 °C) (Temporal)   Resp 23   Ht 5' 3\" (1.6 m)   Wt 163 lb (73.9 kg)   SpO2 99%   BMI 28.87 kg/m²   General appearance: alert and cooperative with exam  HEENT: Head: Normocephalic, no lesions, without obvious abnormality. Neck:no JVD, trachea midline, no adenopathy  Lungs: Clear to auscultation b/l upper lobes, course b/l LL. On NC without distress  Heart: Regular rate and rhythm, s1/s2 auscultated, no murmurs, SR, right radial pulse no hematoma or bleeding noted, small ecchymosis noted around the site  Abdomen: soft, non-tender, bowel sounds active  Extremities: no edema  Neurologic: not done        Assessment / Acute Cardiac Problems:   1. NSTEMI, type I vs type II  2. Pneumonia  3. Hypokalemia  4. Coronary calcifications on CT  5. Hx of CAD per patient - unknown details  6.  Hx of breast CA    Patient Active Problem List:     Cellulitis and abscess of face     Acute exacerbation of chronic obstructive pulmonary disease (COPD) ([address]     Coronary artery disease involving native coronary artery of native heart without angina pectoris     Benign essential HTN     Chronic bilateral low back pain without sciatica     Generalized anxiety disorder     Acute respiratory failure (HCC)     Carcinoma of upper-outer quadrant of left breast in female, estrogen receptor positive ([address]     Pneumonia     Sepsis ([address]     Megaloblastic anemia     Hypoalbuminemia     Puerperal sepsis with acute hypoxic respiratory failure (HCC)     Macrocytosis without anemia     Acute on chronic congestive heart failure with left ventricular diastolic dysfunction (HCC)     Lactic acidemia  Coronary Discharge Core Measure: Please indicate the medication given by X, and if not the reasons not given:    Not Given Reason  Given      Beta Blockers x      ACE-I BP liable       Statins x      ASA x      OAP (Plavix/Effient/Brilinta) Brilnta script was provided     SL Nitro x         Plan of Treatment:   1. Cardiac stent -we will resume home dose Toprol and Imdur . Continue statin and aspirin   2. discussed in detail with patient post cath POC including but not limited to medications, diet, exercise, right radial artery site care, and follow-up. Questions and concerns addressed. OK for discharge home today.  F/U  With Dr. Wolf Rowe  In  2 weeks     Electronically signed by ASHLEE Aquino NP on 5/10/2022 at 9:37 AM  [address]  [phone]

## 2022-05-10 NOTE — PROGRESS NOTES
CLINICAL PHARMACY NOTE: MEDS TO BEDS    Total # of Prescriptions Filled: 3   The following medications were delivered to the patient:  · Prednisone  · brilinta  · aspirin    Additional Documentation:  Copay collected over phone via Alfreda

## 2022-05-10 NOTE — CARE COORDINATION
Discharge [facility] Case Management Department  Written by: Fabiola Jane RN    Patient Name: Francisco Cosme  Attending Provider: General Marlen MD  Admit Date: 2022  4:24 PM  MRN: [id number]  Account: [de-identified]                     : 1950  Discharge Date:       Disposition: home with family support. Home O2 prior to admit at 3-4L/NC. 1530 Explained(2nd) IMM letter and signed. Original to pt, copy in chart. Stated her daughter will be picking her up around 6-7 pm tonight.     Fabiola Jane RN

## 2022-05-10 NOTE — PROGRESS NOTES
[facility]  Internal Medicine Teaching Residency Program  Inpatient Daily Progress Note  ______________________________________________________________________________    Patient: Cira Arciniega  YOB: 1950   OSY:[id number]    Acct: [de-identified]     Room: [facility]1253-  Admit date: 5/5/2022  Today's date: 05/10/22  Number of days in the hospital: 5    SUBJECTIVE   Admitting Diagnosis: Pneumonia  CC: AMS SOB   Pt examined at bedside. Chart & results reviewed. Patient had cath yesterday. HEIKE to Lcx. Currently HDS. Afebrile. No hemotoma seen in R radial access site. Possible DC today pending cardio reccs       ROS:  Constitutional:  negative for chills, fevers, sweats  Respiratory:  Cough and SOB  Cardiovascular:  negative for chest pain, chest pressure/discomfort, lower extremity edema, palpitations  Gastrointestinal:  negative for abdominal pain, constipation, diarrhea, nausea, vomiting  Neurological:  negative for dizziness, headache    BRIEF HISTORY     The patient is a pleasant 80 y.o. female pmh COPD on 2.5L at home, breast cancer, CHF  presents with a chief complaint of AMS. Patient was evaluated by EMS and found her satting in the low 80s. Patient was placed on O2 and taken to ED. Patient is oriented to person and place but unable to tell me what the year is. Patient's daughter reports that she has been having pneumonia for the past month and is being seen in [facility] clinic but is unsure what abx she is on. She was febrile upon arrival to ED. Tmax is 100.4. patient is a breast cancer patient who is s/p lumpectomy and currently receiving chemotherapy. CXR showed bilateral infiltrates suspicious of pneumonia. CT imaging bilateral patchy airspace disease more pronounced on the left. Labs remarkable for leukocytosis of 21.9 and troponins of 570. Patient is currently not complaining of any chest pain,n/v. Will repeat and continue to trend.  Currently a 1 ppd smoker, no alcohol, no illegal drugs    OBJECTIVE     Vital Signs:  BP (!) 117/58   Pulse 62   Temp 99 °F (37.2 °C) (Temporal)   Resp 16   Ht 5' 3\" (1.6 m)   Wt 163 lb (73.9 kg)   SpO2 95%   BMI 28.87 kg/m²     Temp (24hrs), Av.7 °F (36.5 °C), Min:97.3 °F (36.3 °C), Max:99 °F (37.2 °C)    In: 10   Out: 250 [Urine:250]    Physical Exam:  Physical Exam  Constitutional:       General: She is not in acute distress. Appearance: Normal appearance. She is not ill-appearing, toxic-appearing or diaphoretic. HENT:      Mouth/Throat:      Mouth: Mucous membranes are moist.      Pharynx: Oropharynx is clear. Eyes:      General: No scleral icterus. Cardiovascular:      Rate and Rhythm: Normal rate and regular rhythm. Pulses: Normal pulses. Heart sounds: Normal heart sounds. No murmur heard. No friction rub. No gallop. Pulmonary:      Effort: Pulmonary effort is normal.      Breath sounds: Normal breath sounds. Abdominal:      General: Abdomen is flat. Bowel sounds are normal. There is no distension. Palpations: Abdomen is soft. There is no mass. Tenderness: There is no abdominal tenderness. There is no guarding or rebound. Hernia: No hernia is present. Musculoskeletal:         General: No swelling, tenderness, deformity or signs of injury. Right lower leg: No edema. Left lower leg: No edema. Skin:     General: Skin is warm and dry. Coloration: Skin is not jaundiced or pale. Findings: No bruising. Neurological:      Mental Status: She is alert. Cranial Nerves: No cranial nerve deficit. Motor: No weakness.            Medications:  Scheduled Medications:    predniSONE  40 mg Oral Daily    ticagrelor  90 mg Oral BID    sodium chloride flush  5-40 mL IntraVENous 2 times per day    QUEtiapine  25 mg Oral Nightly    diclofenac sodium  2 g Topical BID    furosemide  20 mg IntraVENous Daily    ipratropium-albuterol  1 ampule Inhalation Q4H WA    aspirin 81 mg Oral Daily    budesonide-formoterol  2 puff Inhalation BID    atorvastatin  40 mg Oral Daily    busPIRone  7.5 mg Oral BID     Continuous Infusions:    sodium chloride       PRN Medicationssodium chloride flush, 5-40 mL, PRN  sodium chloride, , PRN  melatonin, 5 mg, Nightly PRN  ibuprofen, 400 mg, Q6H PRN  albuterol, 2.5 mg, Q6H PRN  docusate sodium, 100 mg, BID PRN  ondansetron, 4 mg, Q8H PRN   Or  ondansetron, 4 mg, Q6H PRN  polyethylene glycol, 17 g, Daily PRN  acetaminophen, 650 mg, Q6H PRN   Or  acetaminophen, 650 mg, Q6H PRN        Diagnostic Labs:  CBC:   Recent Labs     05/09/22  0114 05/10/22  0552   WBC 8.7 12.6*   RBC 3.52* 3.62*   HGB 12.3 12.4   HCT 35.7* 36.7   .4 101.4   RDW 14.6* 14.6*    289     BMP:   Recent Labs     05/08/22  0249 05/09/22  0114 05/10/22  0552    140 137   K 3.9 3.7 3.7    105 101   CO2 24 25 27   BUN 11 14 16   CREATININE 0.41* 0.48* 0.53     BNP: No results for input(s): BNP in the last 72 hours. PT/INR:   No results for input(s): PROTIME, INR in the last 72 hours. APTT:   Recent Labs     05/08/22  1739 05/09/22  0114 05/09/22  0819   APTT 43.5* 77.1* 67.0*     CARDIAC ENZYMES: No results for input(s): CKMB, CKMBINDEX, TROPONINI in the last 72 hours. Invalid input(s): CKTOTAL;3  FASTING LIPID PANEL:No results found for: CHOL, HDL, TRIG  LIVER PROFILE:   No results for input(s): AST, ALT, ALB, BILIDIR, BILITOT, ALKPHOS in the last 72 hours. MICROBIOLOGY:   Lab Results   Component Value Date/Time    CULTURE NO GROWTH 4 DAYS 05/05/2022 05:20 PM       Imaging:    CT HEAD WO CONTRAST    Result Date: 5/5/2022  Mild central and cortical cerebral atrophy. Mild chronic deep white matter ischemic changes No acute intracranial abnormalities are noted. CT ABDOMEN PELVIS W IV CONTRAST Additional Contrast? None    Result Date: 5/5/2022  1. Motion limited evaluation with no evidence of acute pulmonary embolus to the segmental level.  2. Patchy bilateral airspace opacities, more prominent on the left. Imaging features can be seen with COVID-19 pneumonia, though are nonspecific and can occur with a variety of infectious and noninfectious processes. PneInd 3. No acute abdominopelvic findings. 4. Unchanged 3.1 cm fusiform infrarenal abdominal aortic aneurysm for which follow-up is recommended in 3 years. 5. Asymmetric 3.8 cm soft tissue density in the left breast for which nonemergent mammographic correlation is recommended. 6. Incidental findings including coronary artery disease, moderate to severe atherosclerosis, diverticulosis and osteopenia. XR CHEST PORTABLE    Result Date: 5/5/2022  Diffuse bilateral interstitial opacities which may be related to pulmonary edema or atypical/viral infection. CT CHEST PULMONARY EMBOLISM W CONTRAST    Result Date: 5/5/2022  1. Motion limited evaluation with no evidence of acute pulmonary embolus to the segmental level. 2. Patchy bilateral airspace opacities, more prominent on the left. Imaging features can be seen with COVID-19 pneumonia, though are nonspecific and can occur with a variety of infectious and noninfectious processes. PneInd 3. No acute abdominopelvic findings. 4. Unchanged 3.1 cm fusiform infrarenal abdominal aortic aneurysm for which follow-up is recommended in 3 years. 5. Asymmetric 3.8 cm soft tissue density in the left breast for which nonemergent mammographic correlation is recommended. 6. Incidental findings including coronary artery disease, moderate to severe atherosclerosis, diverticulosis and osteopenia.        ASSESSMENT & PLAN     Assessment and Plan:    Principal Problem:    Pneumonia  Active Problems:    Sepsis ([address]    Megaloblastic anemia    Hypoalbuminemia    Puerperal sepsis with acute hypoxic respiratory failure (HCC)    Macrocytosis without anemia    Acute on chronic congestive heart failure with left ventricular diastolic dysfunction (HCC)    Lactic acidemia    Acute delirium    Acute exacerbation of chronic obstructive pulmonary disease (COPD) [address]    Coronary artery disease involving native coronary artery of native heart without angina pectoris    Benign essential HTN    Carcinoma of upper-outer quadrant of left breast in female, estrogen receptor positive ([facility]  Resolved Problems:    * No resolved hospital problems. *      Pneumonia   - due to imunosuppressed status, was started on Zosyn initially  - ID recommends monitor off abx.   - f/u resp and blood cultures  -Wean down oxygen     Sepsis  - trend lactate  - monitor leukocytosis. Has resolved currently 8.3      COPD  - resume home inhalers  - supplemental O2 as needed. On HFNC 11L   - montior respiratory status   - solumedrol 40mg BID     Breast cancer  - per chart currently on anastrozole  - s/p partial breast irradiation  - s/p lumpectomy      CAD  Elevated troponin  - troponin are 570, heparin gtt started  - continue to trend  - cardio consulted: agnieszka to [address] on 4/9. Post stent procedure   - ASA and plavix   - EKG NSR per ED      Smoker  - recommend cessation     CHF  - grade 2 diastolic heart failure according to echo in 2019  - continue lasix 20 daily     AAA  - 3.1 x 2.7 fusiform infrarenal AAA, unchanged from prior imaging  - will need fu OP         Sapna Mcnamara MD  Internal medicine, PGY-1  [facility], [address]   @[email or web address] [facility] Network@ 8:27 AM        I have discussed the care of Krystyna Stable , including pertinent history and exam findings,    today with the resident. I have seen and examined the patient and the key elements of all parts of the encounter have been performed by me . I agree with the assessment, plan and orders as documented by the resident.      Principal Problem:    Pneumonia  Active Problems:    Sepsis ([facility]    Megaloblastic anemia    Hypoalbuminemia    Puerperal sepsis with acute hypoxic respiratory failure (HCC)    Macrocytosis without anemia    Acute on chronic congestive heart failure with left ventricular diastolic dysfunction (HCC)    Lactic acidemia    Acute delirium    Acute exacerbation of chronic obstructive pulmonary disease (COPD) ([facility]    Coronary artery disease involving native coronary artery of native heart without angina pectoris    Benign essential HTN    Carcinoma of upper-outer quadrant of left breast in female, estrogen receptor positive ([facility]  Resolved Problems:    * No resolved hospital problems. *        Overall  course ;                                   are improving over time.         Discharge planning          Electronically signed by Monika Klein MD

## 2022-05-13 ENCOUNTER — TELEPHONE (OUTPATIENT)
Dept: RADIATION ONCOLOGY | Facility: MEDICAL CENTER | Age: 72
End: 2022-05-13

## 2022-05-15 NOTE — DISCHARGE SUMMARY
[address]     Department of Internal Medicine - Staff Internal Medicine Teaching Service    INPATIENT DISCHARGE SUMMARY      Patient Identification:  Cindi Kelly is a 80 y.o. female. :  1950  MRN: [id number]     Acct: [de-identified]   PCP: Heena Colvin MD  Admit Date:  2022  Discharge date and time: 5/10/2022  Attending Provider: No att. providers found                                     [address] Problem Lists:  Principal Problem:    Pneumonia  Active Problems:    Sepsis ([address]    Megaloblastic anemia    Hypoalbuminemia    Puerperal sepsis with acute hypoxic respiratory failure (HCC)    Macrocytosis without anemia    Acute on chronic congestive heart failure with left ventricular diastolic dysfunction (HCC)    Lactic acidemia    Acute delirium    Acute exacerbation of chronic obstructive pulmonary disease (COPD) ([address]    Coronary artery disease involving native coronary artery of native heart without angina pectoris    Benign essential HTN    Carcinoma of upper-outer quadrant of left breast in female, estrogen receptor positive ([address]  Resolved Problems:    * No resolved hospital problems. *      HOSPITAL STAY     Brief Inpatient course:      The patient is a pleasant 71 y.o. female pmh COPD on 2.5L at home, breast cancer, CHF  presents with a chief complaint of AMS. Patient was evaluated by EMS and found her satting in the low 80s. Patient was placed on O2 and taken to ED. Patient is oriented to person and place but unable to tell me what the year is. Patient's daughter reports that she has been having pneumonia for the past month and is being seen in [facility] clinic but is unsure what abx she is on. She was febrile upon arrival to ED. Tmax is 100.4. patient is a breast cancer patient who is s/p lumpectomy and currently receiving chemotherapy. CXR showed bilateral infiltrates suspicious of pneumonia.  CT imaging bilateral patchy airspace disease more pronounced on the left. Labs remarkable for leukocytosis of 21.9 and troponins of 570. Patient was placed on zosyn due to concern for PNA. Patient was also taken to cath by cardio which they placed a HEIKE in the Lcx. Patient was DC with appropriate follow up and medications. Procedures/ Significant Interventions:      No results found. Consults:     Consults:     Final Specialist Recommendations/Findings:   IP CONSULT TO INTERNAL MEDICINE  IP CONSULT TO CASE MANAGEMENT  IP CONSULT TO INFECTIOUS DISEASES  IP CONSULT TO CARDIOLOGY      Any Hospital Acquired Infections: none    Discharge Functional Status:  stable    DISCHARGE PLAN     Disposition: home    Patient Instructions:   Discharge Medication List as of 5/10/2022  7:04 PM      START taking these medications    Details   ticagrelor (BRILINTA) 90 MG TABS tablet Take 1 tablet by mouth 2 times daily, Disp-60 tablet, R-0Normal      aspirin 81 MG chewable tablet Take 1 tablet by mouth daily, Disp-30 tablet, R-3Normal         CONTINUE these medications which have CHANGED    Details   predniSONE (DELTASONE) 20 MG tablet Take 2 tablets by mouth daily for 1 dose, Disp-2 tablet, R-0Normal         CONTINUE these medications which have NOT CHANGED    Details   NONFORMULARY Pt uses O2 3L NC prnHistorical Med      metOLazone (ZAROXOLYN) 2.5 MG tablet Historical Med      cyclobenzaprine (FLEXERIL) 5 MG tablet TAKE 1 TABLET BY MOUTH THREE TIMES A DAY AS NEEDEDHistorical Med      albuterol (PROVENTIL) (2.5 MG/3ML) 0.083% nebulizer solution Take 3 mLs by nebulization every 6 hours as needed for Wheezing, Disp-120 each, R-0Print      gabapentin (NEURONTIN) 600 MG tablet Take 1 tablet by mouth 3 times daily for 30 days. , Disp-90 tablet, R-0Print      docusate sodium (COLACE) 100 MG capsule Take 1 capsule by mouth 2 times daily as needed for Constipation, Disp-60 capsule, R-0Print      varenicline (CHANTIX) 0.5 MG tablet Take 1-2 tablets by mouth See Admin Instructions 0.5mg DAILY for 3 days followed by 0.5mg TWICE DAILY for 4 days followed by 1mg TWICE DAILY, Disp-57 tablet, R-0Print      atorvastatin (LIPITOR) 40 MG tablet Take 40 mg by mouth dailyHistorical Med      diclofenac sodium 1 % GEL Apply 4 g topically 3 times daily To knees, hips, spine, neck, arms, Topical, 3 TIMES DAILY, Historical Med      sertraline (ZOLOFT) 25 MG tablet Take 25 mg by mouth dailyHistorical Med      tiotropium (SPIRIVA RESPIMAT) 2.5 MCG/ACT AERS inhaler Inhale 2 puffs into the lungs dailyHistorical Med      oxyCODONE-acetaminophen (PERCOCET) 5-325 MG per tablet Take 1 tablet by mouth every 8 hours as needed for Pain. Historical Med      busPIRone (BUSPAR) 7.5 MG tablet Take 7.5 mg by mouth 2 times daily Historical Med      fluticasone (FLONASE) 50 MCG/ACT nasal spray 1 spray by Each Nostril route dailyHistorical Med      furosemide (LASIX) 40 MG tablet Take 40 mg by mouth dailyHistorical Med      isosorbide mononitrate (IMDUR) 30 MG extended release tablet Take 30 mg by mouth dailyHistorical Med      metoprolol succinate (TOPROL XL) 25 MG extended release tablet Take 25 mg by mouth dailyHistorical Med      montelukast (SINGULAIR) 10 MG tablet Take 10 mg by mouth every morning Historical Med      omeprazole (PRILOSEC) 40 MG delayed release capsule Take 40 mg by mouth daily Historical Med      budesonide-formoterol (SYMBICORT) 160-4.5 MCG/ACT AERO Inhale 2 puffs into the lungs 2 times dailyHistorical Med      amitriptyline (ELAVIL) 25 MG tablet Take 25 mg by mouth nightly as needed for Sleep Historical Med      Multiple Vitamins-Minerals (THERAPEUTIC MULTIVITAMIN-MINERALS) tablet Take 1 tablet by mouth daily             Activity: activity as tolerated    Diet: cardiac diet    Follow-up:    Isaiah Vizcaino MD  [address] [place] [address]  [phone]    In 1 week      Jose DanielJuliano Mima [address] [facility]  [phone]    Schedule an appointment as soon as possible for a visit in 2 weeks  Cardiology      Patient Instructions:   Please continue to take aspirin and Brilinta religiously. Follow with cardiology in 2 weeks. Complete dose of prednisone by tomorrow. In case of new shortness of breath/chest pain.   Contact hospital.      Note that over 30 minutes was spent in preparing discharge papers, discussing discharge with patient, medication review, etc.      Vannessa Zepeda MD  Internal Medicine Resident  [address] [facility]  5/15/2022 2:49 PM

## 2022-07-19 VITALS
DIASTOLIC BLOOD PRESSURE: 69 MMHG | BODY MASS INDEX: 28.53 KG/M2 | OXYGEN SATURATION: 91 % | SYSTOLIC BLOOD PRESSURE: 106 MMHG | WEIGHT: 161 LBS | HEIGHT: 63 IN | HEART RATE: 92 BPM | RESPIRATION RATE: 16 BRPM | TEMPERATURE: 98.3 F

## 2022-07-19 ASSESSMENT — PAIN - FUNCTIONAL ASSESSMENT: PAIN_FUNCTIONAL_ASSESSMENT: 0-10

## 2022-07-19 ASSESSMENT — PAIN SCALES - GENERAL: PAINLEVEL_OUTOF10: 8

## 2022-07-20 ENCOUNTER — HOSPITAL ENCOUNTER (EMERGENCY)
Age: 72
Discharge: HOME | End: 2022-07-20
Attending: STUDENT IN AN ORGANIZED HEALTH CARE EDUCATION/TRAINING PROGRAM
Payer: MEDICARE

## 2022-07-20 ENCOUNTER — APPOINTMENT (OUTPATIENT)
Dept: GENERAL RADIOLOGY | Age: 72
End: 2022-07-20
Payer: MEDICARE

## 2022-07-20 DIAGNOSIS — J18.9 PNEUMONIA DUE TO INFECTIOUS ORGANISM, UNSPECIFIED LATERALITY, UNSPECIFIED PART OF LUNG: ICD-10-CM

## 2022-07-20 DIAGNOSIS — R60.9 PERIPHERAL EDEMA: Primary | ICD-10-CM

## 2022-07-20 DIAGNOSIS — I50.9 ACUTE ON CHRONIC CONGESTIVE HEART FAILURE, UNSPECIFIED HEART FAILURE TYPE (HCC): ICD-10-CM

## 2022-07-20 LAB
ABSOLUTE EOS #: 0.57 K/UL (ref 0–0.44)
ABSOLUTE IMMATURE GRANULOCYTE: 0.02 K/UL (ref 0–0.3)
ABSOLUTE LYMPH #: 2.48 K/UL (ref 1.1–3.7)
ABSOLUTE MONO #: 0.77 K/UL (ref 0.1–1.2)
ANION GAP SERPL CALCULATED.3IONS-SCNC: 9 MMOL/L (ref 9–17)
BASOPHILS # BLD: 1 % (ref 0–2)
BASOPHILS ABSOLUTE: 0.07 K/UL (ref 0–0.2)
BUN BLDV-MCNC: 9 MG/DL (ref 8–23)
BUN/CREAT BLD: 13 (ref 9–20)
CALCIUM SERPL-MCNC: 9.2 MG/DL (ref 8.6–10.4)
CHLORIDE BLD-SCNC: 103 MMOL/L (ref 98–107)
CO2: 27 MMOL/L (ref 20–31)
CREAT SERPL-MCNC: 0.71 MG/DL (ref 0.5–0.9)
EOSINOPHILS RELATIVE PERCENT: 7 % (ref 1–4)
GFR AFRICAN AMERICAN: >60 ML/MIN
GFR NON-AFRICAN AMERICAN: >60 ML/MIN
GFR SERPL CREATININE-BSD FRML MDRD: NORMAL ML/MIN/{1.73_M2}
GLUCOSE BLD-MCNC: 96 MG/DL (ref 70–99)
HCT VFR BLD CALC: 41.5 % (ref 36.3–47.1)
HEMOGLOBIN: 13.4 G/DL (ref 11.9–15.1)
IMMATURE GRANULOCYTES: 0 %
LYMPHOCYTES # BLD: 28 % (ref 24–43)
MCH RBC QN AUTO: 35.2 PG (ref 25.2–33.5)
MCHC RBC AUTO-ENTMCNC: 32.3 G/DL (ref 28.4–34.8)
MCV RBC AUTO: 108.9 FL (ref 82.6–102.9)
MONOCYTES # BLD: 9 % (ref 3–12)
NRBC AUTOMATED: 0 PER 100 WBC
PDW BLD-RTO: 14.8 % (ref 11.8–14.4)
PLATELET # BLD: 395 K/UL (ref 138–453)
PMV BLD AUTO: 9.4 FL (ref 8.1–13.5)
POTASSIUM SERPL-SCNC: 4.1 MMOL/L (ref 3.7–5.3)
PRO-BNP: 462 PG/ML
RBC # BLD: 3.81 M/UL (ref 3.95–5.11)
RBC # BLD: ABNORMAL 10*6/UL
SEG NEUTROPHILS: 55 % (ref 36–65)
SEGMENTED NEUTROPHILS ABSOLUTE COUNT: 4.84 K/UL (ref 1.5–8.1)
SODIUM BLD-SCNC: 139 MMOL/L (ref 135–144)
TROPONIN, HIGH SENSITIVITY: 10 NG/L (ref 0–14)
WBC # BLD: 8.8 K/UL (ref 3.5–11.3)

## 2022-07-20 PROCEDURE — 6370000000 HC RX 637 (ALT 250 FOR IP): Performed by: STUDENT IN AN ORGANIZED HEALTH CARE EDUCATION/TRAINING PROGRAM

## 2022-07-20 PROCEDURE — 83880 ASSAY OF NATRIURETIC PEPTIDE: CPT

## 2022-07-20 PROCEDURE — 71045 X-RAY EXAM CHEST 1 VIEW: CPT

## 2022-07-20 PROCEDURE — 84484 ASSAY OF TROPONIN QUANT: CPT

## 2022-07-20 PROCEDURE — 80048 BASIC METABOLIC PNL TOTAL CA: CPT

## 2022-07-20 PROCEDURE — 96374 THER/PROPH/DIAG INJ IV PUSH: CPT

## 2022-07-20 PROCEDURE — 85025 COMPLETE CBC W/AUTO DIFF WBC: CPT

## 2022-07-20 PROCEDURE — 99284 EMERGENCY DEPT VISIT MOD MDM: CPT

## 2022-07-20 PROCEDURE — 6360000002 HC RX W HCPCS: Performed by: STUDENT IN AN ORGANIZED HEALTH CARE EDUCATION/TRAINING PROGRAM

## 2022-07-20 RX ORDER — DOXYCYCLINE 100 MG/1
100 CAPSULE ORAL ONCE
Status: COMPLETED | OUTPATIENT
Start: 2022-07-20 | End: 2022-07-20

## 2022-07-20 RX ORDER — FUROSEMIDE 10 MG/ML
20 INJECTION INTRAMUSCULAR; INTRAVENOUS ONCE
Status: COMPLETED | OUTPATIENT
Start: 2022-07-20 | End: 2022-07-20

## 2022-07-20 RX ORDER — FUROSEMIDE 10 MG/ML
40 INJECTION INTRAMUSCULAR; INTRAVENOUS ONCE
Status: DISCONTINUED | OUTPATIENT
Start: 2022-07-20 | End: 2022-07-20

## 2022-07-20 RX ORDER — DOXYCYCLINE HYCLATE 100 MG
100 TABLET ORAL 2 TIMES DAILY
Qty: 14 TABLET | Refills: 0 | Status: SHIPPED | OUTPATIENT
Start: 2022-07-20 | End: 2022-07-27

## 2022-07-20 RX ADMIN — DOXYCYCLINE 100 MG: 100 CAPSULE ORAL at 03:15

## 2022-07-20 RX ADMIN — FUROSEMIDE 20 MG: 10 INJECTION, SOLUTION INTRAMUSCULAR; INTRAVENOUS at 03:15

## 2022-07-20 ASSESSMENT — ENCOUNTER SYMPTOMS
EYE DISCHARGE: 0
ABDOMINAL PAIN: 0
COUGH: 1
EYE REDNESS: 0
COLOR CHANGE: 0
SHORTNESS OF BREATH: 1

## 2022-07-20 NOTE — ED PROVIDER NOTES
[facility] ED  Emergency Department Encounter     Pt Name: Mahesh Lau  MRN: [id number]  [place] 1950  Date of evaluation: 7/20/22  PCP:  Heidi Gibson MD    [address] [place] [address]       Chief Complaint   Patient presents with    Leg Swelling       HISTORY OFPRESENT ILLNESS  (Location/Symptom, Timing/Onset, Context/Setting, Quality, Duration, Modifying Mary Ann Rosa Maria.)      Mahesh Lau is a 80 y.o. female who presents with bilateral lower extremity swelling, ongoing for the past 2 weeks. Had recent stent placement at [facility] approximately month ago. She states she has noticed some worsening leg swelling ever since. Known history of CHF. No any medication changes however poor medical historian. Has noticed a coarse cough with some mild shortness of breath and sputum production. Noticed bilateral lower extremity swelling as well as some weight gain. Decreased urination. Worsening bilateral lower extremity pain. PAST MEDICAL / SURGICAL / SOCIAL / FAMILY HISTORY      has a past medical history of Acid reflux, Anxiety and depression, Asthma, CAD (coronary artery disease), CHF (congestive heart failure) ([address] COPD (chronic obstructive pulmonary disease) ([facility] [address] Hyperlipidemia, Hypertension, IBS (irritable bowel syndrome), MI (myocardial infarction) ([facility] [address] On home O2, Osteoarthritis, Peptic ulcer, and PNA (pneumonia). has a past surgical history that includes cervical fusion; Tonsillectomy; Cardiac catheterization (2017); Upper gastrointestinal endoscopy (N/A, 7/31/2020); Colonoscopy (N/A, 7/31/2020); and Upper gastrointestinal endoscopy (N/A, 6/24/2021).     Social History     Socioeconomic History    Marital status: Single     Spouse name: Not on file    Number of children: Not on file    Years of education: Not on file    Highest education level: Not on file   Occupational History    Not on file   Tobacco Use    Smoking status: Every Day     Packs/day: 0.25     Years: 40.00 Pack years: 10.00     Types: Cigarettes    Smokeless tobacco: Never    Tobacco comments:     pt on chantix   Vaping Use    Vaping Use: Never used   Substance and Sexual Activity    Alcohol use: No     Alcohol/week: 0.0 standard drinks    Drug use: No    Sexual activity: Not on file   Other Topics Concern    Not on file   Social History Narrative    Not on file     Social Determinants of Health     Financial Resource Strain: Not on file   Food Insecurity: Not on file   Transportation Needs: Not on file   Physical Activity: Not on file   Stress: Not on file   Social Connections: Not on file   Intimate Partner Violence: Not on file   Housing Stability: Not on file       History reviewed. No pertinent family history. Allergies:  Sulfa antibiotics    Home Medications:  Prior to Admission medications    Medication Sig Start Date End Date Taking? Authorizing Provider   doxycycline hyclate (VIBRA-TABS) 100 MG tablet Take 1 tablet by mouth in the morning and 1 tablet before bedtime. Do all this for 7 days. 7/20/22 7/27/22 Yes Zoya Fly, DO   ticagrelor (BRILINTA) 90 MG TABS tablet Take 1 tablet by mouth 2 times daily 5/10/22   Edison Martins MD   aspirin 81 MG chewable tablet Take 1 tablet by mouth daily 5/11/22   Edison Martins MD   NONFORMULARY Pt uses O2 3L NC prn    Historical Provider, MD   metOLazone (ZAROXOLYN) 2.5 MG tablet  9/17/20   Historical Provider, MD   cyclobenzaprine (FLEXERIL) 5 MG tablet TAKE 1 TABLET BY MOUTH THREE TIMES A DAY AS NEEDED 9/3/20   Historical Provider, MD   albuterol (PROVENTIL) (2.5 MG/3ML) 0.083% nebulizer solution Take 3 mLs by nebulization every 6 hours as needed for Wheezing 6/1/19   Bakari Mckinney MD   gabapentin (NEURONTIN) 600 MG tablet Take 1 tablet by mouth 3 times daily for 30 days.  6/1/19 6/24/21  Bakari Mckinney MD   docusate sodium (COLACE) 100 MG capsule Take 1 capsule by mouth 2 times daily as needed for Constipation 6/1/19   Bartolome Ann MD varenicline (CHANTIX) 0.5 MG tablet Take 1-2 tablets by mouth See Admin Instructions 0.5mg DAILY for 3 days followed by 0.5mg TWICE DAILY for 4 days followed by 1mg TWICE DAILY 6/1/19   Bakari Mckinney MD   atorvastatin (LIPITOR) 40 MG tablet Take 40 mg by mouth daily    Historical Provider, MD   diclofenac sodium 1 % GEL Apply 4 g topically 3 times daily To knees, hips, spine, neck, arms    Historical Provider, MD   sertraline (ZOLOFT) 25 MG tablet Take 25 mg by mouth daily    Historical Provider, MD   tiotropium (SPIRIVA RESPIMAT) 2.5 MCG/ACT AERS inhaler Inhale 2 puffs into the lungs daily    Historical Provider, MD   oxyCODONE-acetaminophen (PERCOCET) 5-325 MG per tablet Take 1 tablet by mouth every 8 hours as needed for Pain.     Historical Provider, MD   busPIRone (BUSPAR) 7.5 MG tablet Take 7.5 mg by mouth 2 times daily     Historical Provider, MD   fluticasone (FLONASE) 50 MCG/ACT nasal spray 1 spray by Each Nostril route daily    Historical Provider, MD   furosemide (LASIX) 40 MG tablet Take 40 mg by mouth daily    Historical Provider, MD   isosorbide mononitrate (IMDUR) 30 MG extended release tablet Take 30 mg by mouth daily    Historical Provider, MD   metoprolol succinate (TOPROL XL) 25 MG extended release tablet Take 25 mg by mouth daily    Historical Provider, MD   montelukast (SINGULAIR) 10 MG tablet Take 10 mg by mouth every morning     Historical Provider, MD   omeprazole (PRILOSEC) 40 MG delayed release capsule Take 40 mg by mouth daily     Historical Provider, MD   budesonide-formoterol (SYMBICORT) 160-4.5 MCG/ACT AERO Inhale 2 puffs into the lungs 2 times daily    Historical Provider, MD   amitriptyline (ELAVIL) 25 MG tablet Take 25 mg by mouth nightly as needed for Sleep     Historical Provider, MD   Multiple Vitamins-Minerals (THERAPEUTIC MULTIVITAMIN-MINERALS) tablet Take 1 tablet by mouth daily    Historical Provider, MD       REVIEW OF SYSTEMS    (2-9 systems for level 4, 10 or more for level 5)      Review of Systems   Constitutional:  Negative for chills and fever. Eyes:  Negative for discharge and redness. Respiratory:  Positive for cough and shortness of breath. Cardiovascular:  Positive for palpitations. Negative for chest pain. Gastrointestinal:  Negative for abdominal pain. Genitourinary:  Negative for flank pain. Musculoskeletal:  Negative for myalgias. Skin:  Negative for color change and rash. Allergic/Immunologic: Positive for environmental allergies. Neurological:  Negative for headaches. Psychiatric/Behavioral:  Negative for agitation and confusion. PHYSICAL EXAM   (up to 7 for level 4, 8 or more for level 5)     INITIAL VITALS:    height is 5' 3\" (1.6 m) and weight is 161 lb (73 kg). Her oral temperature is 98.3 °F (36.8 °C). Her blood pressure is 106/69 and her pulse is 92. Her respiration is 16 and oxygen saturation is 91%. Physical Exam  Vitals and nursing note reviewed. Constitutional:       Appearance: She is well-developed. HENT:      Head: Normocephalic and atraumatic. Nose: Nose normal.      Mouth/Throat:      Mouth: Mucous membranes are moist.   Eyes:      General: No scleral icterus. Conjunctiva/sclera: Conjunctivae normal.      Pupils: Pupils are equal, round, and reactive to light. Neck:      Trachea: No tracheal deviation. Cardiovascular:      Rate and Rhythm: Normal rate and regular rhythm. Heart sounds: Normal heart sounds. No murmur heard. No friction rub. No gallop. Pulmonary:      Effort: Pulmonary effort is normal. No respiratory distress. Breath sounds: Rales present. No wheezing. Comments: Good air movement bilaterally, no tachypnea, speaking in full sentences, crackles to bilateral bases  Abdominal:      General: There is no distension. Palpations: Abdomen is soft. There is no mass. Tenderness: There is no abdominal tenderness. There is no guarding. Hernia: No hernia is present. Musculoskeletal:         General: Normal range of motion. Cervical back: Neck supple. Comments: 3+ pitting edema and tight bilateral lower extremities with no calf pain,   Skin:     General: Skin is warm and dry. Findings: No erythema or rash. Neurological:      Mental Status: She is alert and oriented to person, place, and time. Psychiatric:         Behavior: Behavior normal.       DIFFERENTIAL  DIAGNOSIS     PLAN (LABS / IMAGING / EKG):  Orders Placed This Encounter   Procedures    XR CHEST PORTABLE    Basic Metabolic Panel    CBC with Auto Differential    Brain Natriuretic Peptide    Troponin    Insert peripheral IV       MEDICATIONS ORDERED:  Orders Placed This Encounter   Medications    DISCONTD: furosemide (LASIX) injection 40 mg    furosemide (LASIX) injection 20 mg    doxycycline monohydrate (MONODOX) capsule 100 mg     Order Specific Question:   Antimicrobial Indications     Answer:   Pneumonia (CAP)    doxycycline hyclate (VIBRA-TABS) 100 MG tablet     Sig: Take 1 tablet by mouth in the morning and 1 tablet before bedtime. Do all this for 7 days. Dispense:  14 tablet     Refill:  0       DDX: Pneumonia versus CHF versus medication noncompliance    Initial MDM/Plan: 80 y.o. female who presents with bilateral lower extremity swelling, weight gain. Suspect worsening CHF or perhaps medication change given recent hospital admission and symptoms started afterwards. Chest x-ray.     DIAGNOSTIC RESULTS / EMERGENCY DEPARTMENT COURSE / MDM     LABS:  Labs Reviewed   CBC WITH AUTO DIFFERENTIAL - Abnormal; Notable for the following components:       Result Value    RBC 3.81 (*)     .9 (*)     MCH 35.2 (*)     RDW 14.8 (*)     Eosinophils % 7 (*)     Absolute Eos # 0.57 (*)     All other components within normal limits   BRAIN NATRIURETIC PEPTIDE - Abnormal; Notable for the following components:    Pro- (*)     All other components within normal limits   BASIC METABOLIC PANEL TROPONIN         RADIOLOGY:  XR CHEST PORTABLE    Result Date: 7/20/2022  EXAMINATION: ONE XRAY VIEW OF THE CHEST 7/20/2022 1:44 am COMPARISON: 05/07/2022 HISTORY: ORDERING SYSTEM PROVIDED HISTORY: Leg swelling, suspect worsening CHF TECHNOLOGIST PROVIDED HISTORY: Leg swelling, suspect worsening CHF FINDINGS: Anterior cervical fusion hardware is present. No lines or tubes in the chest. The lungs are hyperinflated with coarse interstitial markings and some areas of patchy opacities in the bases. The overall distribution is stable from the previous exam.  No pleural effusion or pneumothorax are present. The cardiac silhouette is mildly enlarged. The upper lobe vessels are not significantly congested. Coarse interstitial markings with patchy opacities in the bases are similar to the previous exam.  Could be some residual pneumonia or edema superimposed on emphysematous changes. No pleural effusion or pneumothorax. EEMERGENCY DEPARTMENT COURSE:     Mildly elevated BNP. Given Lasix for diuresis. Encouraged to increase this at home. Potassium normal.  Chest x-ray with interstitial markings edema versus pneumonia. Will cover with antibiotics however suspect more edema. Discussed hospital admission versus outpatient follow-up. Patient would like to attempt outpatient follow-up. Discussed taking medications as prescribed. Doubling Lasix to twice a day. Close follow-up with primary care doctor and return for any worsening signs or symptoms. Does have oxygen as well at home and wears this as needed. Based on the low acuity of concerning symptoms and improvement of symptoms, patient will be discharged with follow up and prescription information listed in the Disposition section. Patient states they will follow-up with primary care physician and/or return to the emergency department should they experience a change or worsening of symptoms.   Patient will be discharged with resources: summary of visit, instructions, follow-up information, prescriptions if necessary. Patient/ family instructed to read discharge paperwork. All of their questions and concerns were addressed. Suspicion for any acute life-threatening processes is low. Patient voices understanding of plan. PROCEDURES:  None    CONSULTS:  None    CRITICAL CARE:  0    FINAL IMPRESSION      1. Peripheral edema    2. Acute on chronic congestive heart failure, unspecified heart failure type ([address]    3.  Pneumonia due to infectious organism, unspecified laterality, unspecified part of lung          DISPOSITION / PLAN     DISPOSITION Decision To Discharge 07/20/2022 03:20:07 AM    Discharge    PATIENTREFERRED TO:  Shashank Self MD  [address] [place] [address]  [phone]    Schedule an appointment as soon as possible for a visit in 1 week  As needed      DISCHARGE MEDICATIONS:  Discharge Medication List as of 7/20/2022  3:19 AM          Farrah Soto DO  EmergencyMedicine Attending    (Please note that portions of this note were completed with a voice recognition program.  Efforts were made to edit the dictations but occasionally words are mis-transcribed.)       Farrah Soto DO  07/20/22 [id number]

## 2022-07-20 NOTE — DISCHARGE INSTRUCTIONS
As discussed this is worsening please return to the emergency department otherwise take antibiotics to completion. Increase your Lasix dosing to twice a day. Take your medication as indicated and prescribed. If you are given an antibiotic then, make sure you get the prescription filled and take the antibiotics until finished. Drink plenty of water while taking the antibiotics. Avoid drinking alcohol or drinks that have caffeine in it while taking antibiotics. If you were given a prescription for prednisone or any other steroid then, take Pepcid (famotidine - over the counter) every day while you are taking the steroids. If you are a diabetic, you should check your blood sugar more frequently while taking prednisone. For fever or pain use acetaminophen (Tylenol) or ibuprofen (Motrin / Advil), unless prescribed medications that have acetaminophen or ibuprofen (or similar medications) in it. You can take over the counter acetaminophen tablets (1 - 2 tablets of the 500-mg strength every 6 hours) or ibuprofen tablets (2 tablets every 4 hours).     PLEASE RETURN TO THE EMERGENCY DEPARTMENT IMMEDIATELY for worsening symptoms, shortness of breathing, change in the amount of sputum that you cough up or a change in the color of your sputum, using your inhaler more frequently or if your inhaler only lasts up to 2 hours (if given an inhaler), or if you develop any concerning symptoms such as: high fever not relieved by acetaminophen (Tylenol) and/or ibuprofen (Motrin / Advil), chills, shortness of breath, chest pain, feeling of your heart fluttering or racing, persistent nausea and/or vomiting, vomiting up blood, blood in your stool, loss of consciousness, numbness, weakness or tingling in the arms or legs or change in color of the extremities, changes in mental status, persistent headache, blurry vision, loss of bladder / bowel control, unable to follow up with your physician, or other any other care or concern.

## 2024-03-16 NOTE — CONSULTS
Inpatient consult to Pulmonology  Consult performed by: Andrea Cross MD  Consult ordered by: Nakia Mccormack MD         Pulmonary Medicine and [address] MD      Patient - Myrtle Soliz   MRN -  [id number]   Janessa # - [de-identified]   - 1950      Date of Admission -  2019  7:53 PM  Date of evaluation -  2019  [facility]   Priya Mclain MD Primary Care Physician - Mitch Villavicencio MD     Reason for Consult    Acute on chronic hypoxic respiratory failure    Assessment   · Acute on chronic hypoxic respiratory failure  · Acute exacerbation of COPD  · Tracheo-bronchitis  · Active smoking,> 50-pack-year smoking    Recommendations   · Continue IV antibiotics, Rocephin/oral doxycycline  · IV solu-medrol  · Albuterol and Ipratropium Q 4 hours and prn  · Add Dulera 200  · Continue Singulair  · X-ray chest in am  · Labs: CBC and BMP in am  · Nicotine patch  · BiPAP, when necessary  · 2 liters/min via nasal cannula  · DVT prophylaxis with low molecular weight heparin  · Will follow with you    HPI     Myrtle Soliz is 80 y.o.,  female, admitted because of AECOPD. She has cough mostly dry with occasional production of sputum, which is pale yellow in color. Her dyspnea got worse over the last few days. She denies chest pain. She denies hemoptysis. She denies fever or chill but felt warm. She denies significant nasal congestion. She actively smokes, around 50-pack-year smoking    PMHx   Past Medical History      Diagnosis Date    Asthma     Osteoarthritis       Past Surgical History    History reviewed. No pertinent surgical history.     Meds    Current Medications    potassium chloride  20 mEq Oral Daily with breakfast    nicotine  1 patch Transdermal Daily    [START ON 2019] pneumococcal 13-valent conjugate  0.5 mL Intramuscular Once    busPIRone  7.5 mg Oral BID    fluticasone  1 spray Each Nostril Daily    furosemide  40 mg Oral
[id number]

## (undated) DEVICE — FORCEPS BX L240CM WRK CHN 2.8MM STD CAP W/ NDL MIC MESH

## (undated) DEVICE — TRAP SURG QUAD PARABOLA SLOT DSGN SFTY SCRN TRAPEASE

## (undated) DEVICE — GAUZE,SPONGE,4"X4",16PLY,STRL,LF,10/TRAY: Brand: MEDLINE

## (undated) DEVICE — Device: Brand: DEFENDO VALVE AND CONNECTOR KIT

## (undated) DEVICE — MEDICINE CUP, GRADUATED, STER: Brand: MEDLINE

## (undated) DEVICE — BLOCK BITE 60FR RUBBER ADLT DENTAL

## (undated) DEVICE — JELLY,LUBE,STERILE,FLIP TOP,TUBE,2-OZ: Brand: MEDLINE

## (undated) DEVICE — ADAPTER TBNG LUER STUB 15 GA INTMED

## (undated) DEVICE — BASIN EMSIS 700ML GRAPHITE PLAS KID SHP GRAD

## (undated) DEVICE — GLOVE SURG 8 11.7IN BEAD CUF LIGHT BRN SENSICARE LTX FREE

## (undated) DEVICE — CO2 CANNULA,SUPERSOFT, ADLT,7'O2,7'CO2: Brand: MEDLINE

## (undated) DEVICE — FORCEPS BX L240CM JAW DIA2.2MM RAD JAW 4 HOT DISP

## (undated) DEVICE — CUP MED 1OZ CLR POLYPR FEED GRAD W/O LID

## (undated) DEVICE — ELECTRODE PT RET AD L9FT HI MOIST COND ADH HYDRGEL CORDED

## (undated) DEVICE — SINGLE-USE POLYPECTOMY SNARE: Brand: CAPTIVATOR

## (undated) DEVICE — CONMED DISPOSABLE GASTROINTESTINAL CYTOLOGY BRUSH, STRAIGHT HANDLE, 2.5 MM X 160 CM: Brand: CONMED